# Patient Record
Sex: MALE | Race: WHITE | NOT HISPANIC OR LATINO
[De-identification: names, ages, dates, MRNs, and addresses within clinical notes are randomized per-mention and may not be internally consistent; named-entity substitution may affect disease eponyms.]

---

## 2017-01-11 ENCOUNTER — FORM ENCOUNTER (OUTPATIENT)
Age: 62
End: 2017-01-11

## 2017-01-12 ENCOUNTER — OUTPATIENT (OUTPATIENT)
Dept: OUTPATIENT SERVICES | Facility: HOSPITAL | Age: 62
LOS: 1 days | End: 2017-01-12
Payer: MEDICAID

## 2017-01-12 ENCOUNTER — APPOINTMENT (OUTPATIENT)
Dept: PULMONOLOGY | Facility: CLINIC | Age: 62
End: 2017-01-12

## 2017-01-12 DIAGNOSIS — Z90.89 ACQUIRED ABSENCE OF OTHER ORGANS: Chronic | ICD-10-CM

## 2017-01-12 DIAGNOSIS — S82.891A OTHER FRACTURE OF RIGHT LOWER LEG, INITIAL ENCOUNTER FOR CLOSED FRACTURE: Chronic | ICD-10-CM

## 2017-01-12 PROCEDURE — G0297: CPT | Mod: 26

## 2017-01-12 PROCEDURE — G0297: CPT

## 2017-08-10 ENCOUNTER — APPOINTMENT (OUTPATIENT)
Dept: OTOLARYNGOLOGY | Facility: CLINIC | Age: 62
End: 2017-08-10
Payer: MEDICAID

## 2017-08-10 VITALS
DIASTOLIC BLOOD PRESSURE: 77 MMHG | SYSTOLIC BLOOD PRESSURE: 141 MMHG | HEIGHT: 71 IN | BODY MASS INDEX: 23.1 KG/M2 | HEART RATE: 74 BPM | WEIGHT: 165 LBS

## 2017-08-10 DIAGNOSIS — H93.12 TINNITUS, LEFT EAR: ICD-10-CM

## 2017-08-10 DIAGNOSIS — H61.22 IMPACTED CERUMEN, LEFT EAR: ICD-10-CM

## 2017-08-10 DIAGNOSIS — H90.3 SENSORINEURAL HEARING LOSS, BILATERAL: ICD-10-CM

## 2017-08-10 DIAGNOSIS — H91.90 UNSPECIFIED HEARING LOSS, UNSPECIFIED EAR: ICD-10-CM

## 2017-08-10 PROCEDURE — 92557 COMPREHENSIVE HEARING TEST: CPT

## 2017-08-10 PROCEDURE — G0268 REMOVAL OF IMPACTED WAX MD: CPT | Mod: LT

## 2017-08-10 PROCEDURE — 99204 OFFICE O/P NEW MOD 45 MIN: CPT | Mod: 25

## 2017-08-10 PROCEDURE — 92550 TYMPANOMETRY & REFLEX THRESH: CPT

## 2017-08-10 PROCEDURE — 92588 EVOKED AUDITORY TST COMPLETE: CPT

## 2017-08-11 PROBLEM — H93.12 TINNITUS OF LEFT EAR: Status: ACTIVE | Noted: 2017-08-11

## 2017-09-25 PROBLEM — H91.90 HEARING LOSS: Status: ACTIVE | Noted: 2017-08-11

## 2017-09-25 PROBLEM — H61.22 EXCESSIVE CERUMEN IN EAR CANAL, LEFT: Status: ACTIVE | Noted: 2017-09-25

## 2017-09-25 PROBLEM — H90.3 BILATERAL SENSORINEURAL HEARING LOSS: Status: ACTIVE | Noted: 2017-09-25

## 2018-01-29 ENCOUNTER — FORM ENCOUNTER (OUTPATIENT)
Age: 63
End: 2018-01-29

## 2018-01-30 ENCOUNTER — APPOINTMENT (OUTPATIENT)
Dept: CT IMAGING | Facility: HOSPITAL | Age: 63
End: 2018-01-30

## 2018-01-30 ENCOUNTER — APPOINTMENT (OUTPATIENT)
Dept: PULMONOLOGY | Facility: CLINIC | Age: 63
End: 2018-01-30
Payer: MEDICAID

## 2018-01-30 ENCOUNTER — OUTPATIENT (OUTPATIENT)
Dept: OUTPATIENT SERVICES | Facility: HOSPITAL | Age: 63
LOS: 1 days | End: 2018-01-30
Payer: MEDICAID

## 2018-01-30 VITALS
HEART RATE: 76 BPM | OXYGEN SATURATION: 98 % | SYSTOLIC BLOOD PRESSURE: 102 MMHG | HEIGHT: 71 IN | DIASTOLIC BLOOD PRESSURE: 70 MMHG | WEIGHT: 176 LBS | BODY MASS INDEX: 24.64 KG/M2

## 2018-01-30 DIAGNOSIS — S82.891A OTHER FRACTURE OF RIGHT LOWER LEG, INITIAL ENCOUNTER FOR CLOSED FRACTURE: Chronic | ICD-10-CM

## 2018-01-30 DIAGNOSIS — Z90.89 ACQUIRED ABSENCE OF OTHER ORGANS: Chronic | ICD-10-CM

## 2018-01-30 PROCEDURE — G0296 VISIT TO DETERM LDCT ELIG: CPT

## 2018-01-30 PROCEDURE — G0297: CPT | Mod: 26

## 2018-01-30 PROCEDURE — G0297: CPT

## 2018-04-11 ENCOUNTER — APPOINTMENT (OUTPATIENT)
Dept: ORTHOPEDIC SURGERY | Facility: CLINIC | Age: 63
End: 2018-04-11
Payer: MEDICAID

## 2018-04-11 VITALS
HEIGHT: 71 IN | WEIGHT: 170 LBS | SYSTOLIC BLOOD PRESSURE: 143 MMHG | BODY MASS INDEX: 23.8 KG/M2 | DIASTOLIC BLOOD PRESSURE: 87 MMHG

## 2018-04-11 DIAGNOSIS — S82.61XS DISPLACED FRACTURE OF LATERAL MALLEOLUS OF RIGHT FIBULA, SEQUELA: ICD-10-CM

## 2018-04-11 DIAGNOSIS — M76.71 PERONEAL TENDINITIS, RIGHT LEG: ICD-10-CM

## 2018-04-11 PROCEDURE — 73610 X-RAY EXAM OF ANKLE: CPT | Mod: RT

## 2018-04-11 PROCEDURE — 99203 OFFICE O/P NEW LOW 30 MIN: CPT

## 2019-02-04 ENCOUNTER — FORM ENCOUNTER (OUTPATIENT)
Age: 64
End: 2019-02-04

## 2019-02-05 ENCOUNTER — OUTPATIENT (OUTPATIENT)
Dept: OUTPATIENT SERVICES | Facility: HOSPITAL | Age: 64
LOS: 1 days | End: 2019-02-05
Payer: MEDICAID

## 2019-02-05 ENCOUNTER — APPOINTMENT (OUTPATIENT)
Dept: PULMONOLOGY | Facility: CLINIC | Age: 64
End: 2019-02-05
Payer: MEDICAID

## 2019-02-05 ENCOUNTER — APPOINTMENT (OUTPATIENT)
Dept: CT IMAGING | Facility: HOSPITAL | Age: 64
End: 2019-02-05

## 2019-02-05 VITALS
HEIGHT: 71 IN | OXYGEN SATURATION: 96 % | RESPIRATION RATE: 12 BRPM | HEART RATE: 81 BPM | SYSTOLIC BLOOD PRESSURE: 158 MMHG | TEMPERATURE: 97.2 F | BODY MASS INDEX: 25.2 KG/M2 | DIASTOLIC BLOOD PRESSURE: 98 MMHG | WEIGHT: 180 LBS

## 2019-02-05 DIAGNOSIS — S82.891A OTHER FRACTURE OF RIGHT LOWER LEG, INITIAL ENCOUNTER FOR CLOSED FRACTURE: Chronic | ICD-10-CM

## 2019-02-05 DIAGNOSIS — Z90.89 ACQUIRED ABSENCE OF OTHER ORGANS: Chronic | ICD-10-CM

## 2019-02-05 PROCEDURE — G0297: CPT | Mod: 26

## 2019-02-05 PROCEDURE — G0296 VISIT TO DETERM LDCT ELIG: CPT

## 2019-02-05 PROCEDURE — G0297: CPT

## 2019-02-05 PROCEDURE — 99406 BEHAV CHNG SMOKING 3-10 MIN: CPT

## 2019-12-20 ENCOUNTER — INPATIENT (INPATIENT)
Facility: HOSPITAL | Age: 64
LOS: 3 days | Discharge: HOME | DRG: 517 | End: 2019-12-24
Attending: ORTHOPAEDIC SURGERY | Admitting: ORTHOPAEDIC SURGERY
Payer: MEDICAID

## 2019-12-20 VITALS
HEART RATE: 88 BPM | DIASTOLIC BLOOD PRESSURE: 104 MMHG | RESPIRATION RATE: 18 BRPM | TEMPERATURE: 98 F | OXYGEN SATURATION: 98 % | HEIGHT: 71 IN | SYSTOLIC BLOOD PRESSURE: 166 MMHG | WEIGHT: 175.05 LBS

## 2019-12-20 DIAGNOSIS — Z90.89 ACQUIRED ABSENCE OF OTHER ORGANS: Chronic | ICD-10-CM

## 2019-12-20 DIAGNOSIS — S82.891A OTHER FRACTURE OF RIGHT LOWER LEG, INITIAL ENCOUNTER FOR CLOSED FRACTURE: Chronic | ICD-10-CM

## 2019-12-20 LAB
ALBUMIN SERPL ELPH-MCNC: 4.5 G/DL — SIGNIFICANT CHANGE UP (ref 3.3–5)
ALP SERPL-CCNC: 61 U/L — SIGNIFICANT CHANGE UP (ref 40–120)
ALT FLD-CCNC: 17 U/L — SIGNIFICANT CHANGE UP (ref 10–45)
ANION GAP SERPL CALC-SCNC: 14 MMOL/L — SIGNIFICANT CHANGE UP (ref 5–17)
APTT BLD: 27.8 SEC — SIGNIFICANT CHANGE UP (ref 27.5–36.3)
AST SERPL-CCNC: 23 U/L — SIGNIFICANT CHANGE UP (ref 10–40)
BASOPHILS # BLD AUTO: 0.05 K/UL — SIGNIFICANT CHANGE UP (ref 0–0.2)
BASOPHILS NFR BLD AUTO: 0.8 % — SIGNIFICANT CHANGE UP (ref 0–2)
BILIRUB SERPL-MCNC: 0.4 MG/DL — SIGNIFICANT CHANGE UP (ref 0.2–1.2)
BLD GP AB SCN SERPL QL: NEGATIVE — SIGNIFICANT CHANGE UP
BUN SERPL-MCNC: 13 MG/DL — SIGNIFICANT CHANGE UP (ref 7–23)
CALCIUM SERPL-MCNC: 9.5 MG/DL — SIGNIFICANT CHANGE UP (ref 8.4–10.5)
CHLORIDE SERPL-SCNC: 108 MMOL/L — SIGNIFICANT CHANGE UP (ref 96–108)
CO2 SERPL-SCNC: 19 MMOL/L — LOW (ref 22–31)
CREAT SERPL-MCNC: 0.77 MG/DL — SIGNIFICANT CHANGE UP (ref 0.5–1.3)
EOSINOPHIL # BLD AUTO: 0.12 K/UL — SIGNIFICANT CHANGE UP (ref 0–0.5)
EOSINOPHIL NFR BLD AUTO: 1.8 % — SIGNIFICANT CHANGE UP (ref 0–6)
GLUCOSE SERPL-MCNC: 108 MG/DL — HIGH (ref 70–99)
HCT VFR BLD CALC: 45.8 % — SIGNIFICANT CHANGE UP (ref 39–50)
HGB BLD-MCNC: 15.3 G/DL — SIGNIFICANT CHANGE UP (ref 13–17)
IMM GRANULOCYTES NFR BLD AUTO: 0.2 % — SIGNIFICANT CHANGE UP (ref 0–1.5)
INR BLD: 0.99 — SIGNIFICANT CHANGE UP (ref 0.88–1.16)
LYMPHOCYTES # BLD AUTO: 1.36 K/UL — SIGNIFICANT CHANGE UP (ref 1–3.3)
LYMPHOCYTES # BLD AUTO: 20.5 % — SIGNIFICANT CHANGE UP (ref 13–44)
MCHC RBC-ENTMCNC: 31.9 PG — SIGNIFICANT CHANGE UP (ref 27–34)
MCHC RBC-ENTMCNC: 33.4 GM/DL — SIGNIFICANT CHANGE UP (ref 32–36)
MCV RBC AUTO: 95.4 FL — SIGNIFICANT CHANGE UP (ref 80–100)
MONOCYTES # BLD AUTO: 0.6 K/UL — SIGNIFICANT CHANGE UP (ref 0–0.9)
MONOCYTES NFR BLD AUTO: 9 % — SIGNIFICANT CHANGE UP (ref 2–14)
NEUTROPHILS # BLD AUTO: 4.49 K/UL — SIGNIFICANT CHANGE UP (ref 1.8–7.4)
NEUTROPHILS NFR BLD AUTO: 67.7 % — SIGNIFICANT CHANGE UP (ref 43–77)
NRBC # BLD: 0 /100 WBCS — SIGNIFICANT CHANGE UP (ref 0–0)
PLATELET # BLD AUTO: 192 K/UL — SIGNIFICANT CHANGE UP (ref 150–400)
POTASSIUM SERPL-MCNC: 4 MMOL/L — SIGNIFICANT CHANGE UP (ref 3.5–5.3)
POTASSIUM SERPL-SCNC: 4 MMOL/L — SIGNIFICANT CHANGE UP (ref 3.5–5.3)
PROT SERPL-MCNC: 7 G/DL — SIGNIFICANT CHANGE UP (ref 6–8.3)
PROTHROM AB SERPL-ACNC: 11.2 SEC — SIGNIFICANT CHANGE UP (ref 10–12.9)
RBC # BLD: 4.8 M/UL — SIGNIFICANT CHANGE UP (ref 4.2–5.8)
RBC # FLD: 12.1 % — SIGNIFICANT CHANGE UP (ref 10.3–14.5)
RH IG SCN BLD-IMP: NEGATIVE — SIGNIFICANT CHANGE UP
SODIUM SERPL-SCNC: 141 MMOL/L — SIGNIFICANT CHANGE UP (ref 135–145)
WBC # BLD: 6.63 K/UL — SIGNIFICANT CHANGE UP (ref 3.8–10.5)
WBC # FLD AUTO: 6.63 K/UL — SIGNIFICANT CHANGE UP (ref 3.8–10.5)

## 2019-12-20 PROCEDURE — 73700 CT LOWER EXTREMITY W/O DYE: CPT | Mod: 26,RT

## 2019-12-20 PROCEDURE — 99253 IP/OBS CNSLTJ NEW/EST LOW 45: CPT | Mod: GC

## 2019-12-20 PROCEDURE — 71045 X-RAY EXAM CHEST 1 VIEW: CPT | Mod: 26

## 2019-12-20 PROCEDURE — 73590 X-RAY EXAM OF LOWER LEG: CPT | Mod: 26

## 2019-12-20 PROCEDURE — 73552 X-RAY EXAM OF FEMUR 2/>: CPT | Mod: 26

## 2019-12-20 PROCEDURE — 99285 EMERGENCY DEPT VISIT HI MDM: CPT | Mod: 25

## 2019-12-20 PROCEDURE — 73562 X-RAY EXAM OF KNEE 3: CPT | Mod: 26

## 2019-12-20 PROCEDURE — 73560 X-RAY EXAM OF KNEE 1 OR 2: CPT | Mod: 26,RT

## 2019-12-20 RX ORDER — MORPHINE SULFATE 50 MG/1
4 CAPSULE, EXTENDED RELEASE ORAL ONCE
Refills: 0 | Status: DISCONTINUED | OUTPATIENT
Start: 2019-12-20 | End: 2019-12-20

## 2019-12-20 RX ORDER — ONDANSETRON 8 MG/1
4 TABLET, FILM COATED ORAL EVERY 6 HOURS
Refills: 0 | Status: DISCONTINUED | OUTPATIENT
Start: 2019-12-20 | End: 2019-12-24

## 2019-12-20 RX ORDER — SENNA PLUS 8.6 MG/1
2 TABLET ORAL AT BEDTIME
Refills: 0 | Status: DISCONTINUED | OUTPATIENT
Start: 2019-12-20 | End: 2019-12-24

## 2019-12-20 RX ORDER — PANTOPRAZOLE SODIUM 20 MG/1
40 TABLET, DELAYED RELEASE ORAL
Refills: 0 | Status: DISCONTINUED | OUTPATIENT
Start: 2019-12-20 | End: 2019-12-24

## 2019-12-20 RX ORDER — POLYETHYLENE GLYCOL 3350 17 G/17G
17 POWDER, FOR SOLUTION ORAL DAILY
Refills: 0 | Status: DISCONTINUED | OUTPATIENT
Start: 2019-12-20 | End: 2019-12-24

## 2019-12-20 RX ORDER — LEVOTHYROXINE SODIUM 125 MCG
88 TABLET ORAL DAILY
Refills: 0 | Status: DISCONTINUED | OUTPATIENT
Start: 2019-12-21 | End: 2019-12-24

## 2019-12-20 RX ORDER — ACETAMINOPHEN 500 MG
650 TABLET ORAL EVERY 6 HOURS
Refills: 0 | Status: DISCONTINUED | OUTPATIENT
Start: 2019-12-20 | End: 2019-12-22

## 2019-12-20 RX ORDER — AMLODIPINE BESYLATE 2.5 MG/1
10 TABLET ORAL DAILY
Refills: 0 | Status: DISCONTINUED | OUTPATIENT
Start: 2019-12-21 | End: 2019-12-24

## 2019-12-20 RX ORDER — SODIUM CHLORIDE 9 MG/ML
1000 INJECTION INTRAMUSCULAR; INTRAVENOUS; SUBCUTANEOUS
Refills: 0 | Status: DISCONTINUED | OUTPATIENT
Start: 2019-12-20 | End: 2019-12-24

## 2019-12-20 RX ORDER — SODIUM CHLORIDE 9 MG/ML
3 INJECTION INTRAMUSCULAR; INTRAVENOUS; SUBCUTANEOUS ONCE
Refills: 0 | Status: COMPLETED | OUTPATIENT
Start: 2019-12-20 | End: 2019-12-20

## 2019-12-20 RX ORDER — ZOLPIDEM TARTRATE 10 MG/1
5 TABLET ORAL AT BEDTIME
Refills: 0 | Status: DISCONTINUED | OUTPATIENT
Start: 2019-12-20 | End: 2019-12-24

## 2019-12-20 RX ADMIN — MORPHINE SULFATE 4 MILLIGRAM(S): 50 CAPSULE, EXTENDED RELEASE ORAL at 19:11

## 2019-12-20 RX ADMIN — MORPHINE SULFATE 4 MILLIGRAM(S): 50 CAPSULE, EXTENDED RELEASE ORAL at 23:39

## 2019-12-20 RX ADMIN — SODIUM CHLORIDE 3 MILLILITER(S): 9 INJECTION INTRAMUSCULAR; INTRAVENOUS; SUBCUTANEOUS at 19:13

## 2019-12-20 NOTE — ED PROVIDER NOTE - DIAGNOSTIC INTERPRETATION
CXR- 1 view- no consolidation- no effusion normal bones and ST nl mediastinum     right knee- 3 views- displaced  patellar fx noted     right tibia/ fib - 2 views- right patellar fx      right femur- 2 views- right patellar fx

## 2019-12-20 NOTE — ED ADULT NURSE NOTE - INTERVENTIONS DEFINITIONS
Instruct patient to call for assistance/Non-slip footwear when patient is off stretcher/Physically safe environment: no spills, clutter or unnecessary equipment/Stretcher in lowest position, wheels locked, appropriate side rails in place/Provide visual cue, wrist band, yellow gown, etc.

## 2019-12-20 NOTE — ED ADULT NURSE NOTE - PSH
Ankle fracture, right  5 yrs ago tx @[facility] with plates and rodes  S/P tonsillectomy

## 2019-12-20 NOTE — ED PROVIDER NOTE - OBJECTIVE STATEMENT
65 yo male with hx of HTN bibems after mechanical fall 1 hr ago in his building lobby - pt reports the floor was slippery and he subsequently fell onto his right knee- superficial abrasion mid aspect of knee with STS and noted effusion on arrival to ED- no LOC, no head traum  no sob or chest pain before or after the fall- no dizziness, TET UTD

## 2019-12-20 NOTE — ED ADULT NURSE NOTE - OBJECTIVE STATEMENT
Patient is a 63yo male reporting mechanical fall on marble today, landing on right knee. Patient reports pain, swelling, and inability to bear weight on right knee. Denies head injury, LOC, chest pain, shortness of breath. Pedal pulses intact.

## 2019-12-20 NOTE — H&P ADULT - ASSESSMENT
A/P: 64M presents with displaced R patella fracture  - Admit to Orthopaedics  - Plan for OR tomorrow w Dr. Aldana for ORIF R patella  - Patient placed in knee immobilizer -- NWB RLE  - NPO/IVF at midnight  - Pain control  - Hold anticoagulation for OR  - CBC/CMP/Coags/UA/T&S  - CXR/EKG  - Medical Clearance by med consult pending  - Discussed with attending-- Dr. Aldana    Ortho Pager [phone]

## 2019-12-20 NOTE — H&P ADULT - NSHPLABSRESULTS_GEN_ALL_CORE
Labs:                        15.3   6.63  )-----------( 192      ( 20 Dec 2019 19:10 )             45.8       12-20    141  |  108  |  13  ----------------------------<  108<H>  4.0   |  19<L>  |  0.77    Ca    9.5      20 Dec 2019 19:10    TPro  7.0  /  Alb  4.5  /  TBili  0.4  /  DBili  x   /  AST  23  /  ALT  17  /  AlkPhos  61  12-20          PT/INR - ( 20 Dec 2019 19:10 )   PT: 11.2 sec;   INR: 0.99          PTT - ( 20 Dec 2019 19:10 )  PTT:27.8 sec      R knee XR shows significantly displaced transverse fracture of the patella    CT pending

## 2019-12-20 NOTE — H&P ADULT - NSHPPHYSICALEXAM_GEN_ALL_CORE
Vital Signs Last 24 Hrs  T(C): 36.6 (20 Dec 2019 22:13), Max: 36.6 (20 Dec 2019 18:22)  T(F): 97.9 (20 Dec 2019 22:13), Max: 97.9 (20 Dec 2019 18:22)  HR: 73 (20 Dec 2019 22:13) (73 - 88)  BP: 136/78 (20 Dec 2019 22:13) (136/78 - 166/104)  BP(mean): --  RR: 18 (20 Dec 2019 22:13) (18 - 18)  SpO2: 95% (20 Dec 2019 22:13) (95% - 98%)    VSS  General: Well-appearing adult male lying supine; NAD; A&Ox3  RLE: Obvious visible and palpable gapping of the patella; Small superficial abrasion over the inferior fragment that does not track deeply; Unable to straight leg raise; NVID, DP 2+, TA/EHL/FHL/GS 5/5; Knee ROM limited due to pain

## 2019-12-20 NOTE — ED ADULT TRIAGE NOTE - OTHER COMPLAINTS
slip and fall on marble, landed on R knee states "it feels like it is split in half", denies other injuries. +swelling noted to R knee

## 2019-12-20 NOTE — H&P ADULT - NSICDXPASTSURGICALHX_GEN_ALL_CORE_FT
PAST SURGICAL HISTORY:  Ankle fracture, right 5 yrs ago tx @[facility] with plates and sharron    S/P tonsillectomy

## 2019-12-20 NOTE — ED PROVIDER NOTE - PSH
Ankle fracture, right  5 yrs ago tx @[facility] with plates and rodes  S/P tonsillectomy

## 2019-12-20 NOTE — H&P ADULT - HISTORY OF PRESENT ILLNESS
64M presents w a R patella fracture after a fall in his apartment building. Pt states he was walking for the elevator on the wet lobby floor. Slipped and fell directly onto bent R knee. Had immediate pain and felt a "crack." Unable to ambulate afterwards. Denies any other injuries or sxs at this time. Denies any sxs that preceded his fall including chest pain/SOB/dizziness/blurred vision. Denies head trauma/LOC.

## 2019-12-20 NOTE — ED PROVIDER NOTE - CLINICAL SUMMARY MEDICAL DECISION MAKING FREE TEXT BOX
fall patellar  fx  admit ortho surgery in am 65 yo male with mechanical fall, noted displaced right patellar fx requires admission for ORIF

## 2019-12-20 NOTE — ED PROVIDER NOTE - SKIN [+], MLM
.25 cm  right anterior knee  pos STS  with hematoma and palp defect secondary to noted patellar fx/ABRASION

## 2019-12-20 NOTE — H&P ADULT - NSICDXFAMILYHX_GEN_ALL_CORE_FT
FAMILY HISTORY:  Father  Still living? No  Family history of acute myocardial infarction, Age at diagnosis: Age Unknown

## 2019-12-20 NOTE — ED PROVIDER NOTE - WEIGHT BEARING
pt states earlier today while driving he experienced some palpations tingling down b/l arms and ears. states all symptoms have resolved and now he feels weak. vss, NAD pt ambulatory to triage with steady gait. UNABLE

## 2019-12-21 LAB
ANION GAP SERPL CALC-SCNC: 10 MMOL/L — SIGNIFICANT CHANGE UP (ref 5–17)
APPEARANCE UR: CLEAR — SIGNIFICANT CHANGE UP
BILIRUB UR-MCNC: NEGATIVE — SIGNIFICANT CHANGE UP
BUN SERPL-MCNC: 17 MG/DL — SIGNIFICANT CHANGE UP (ref 7–23)
CALCIUM SERPL-MCNC: 8.9 MG/DL — SIGNIFICANT CHANGE UP (ref 8.4–10.5)
CHLORIDE SERPL-SCNC: 105 MMOL/L — SIGNIFICANT CHANGE UP (ref 96–108)
CO2 SERPL-SCNC: 23 MMOL/L — SIGNIFICANT CHANGE UP (ref 22–31)
COLOR SPEC: YELLOW — SIGNIFICANT CHANGE UP
CREAT SERPL-MCNC: 0.79 MG/DL — SIGNIFICANT CHANGE UP (ref 0.5–1.3)
DIFF PNL FLD: NEGATIVE — SIGNIFICANT CHANGE UP
GLUCOSE SERPL-MCNC: 96 MG/DL — SIGNIFICANT CHANGE UP (ref 70–99)
GLUCOSE UR QL: NEGATIVE — SIGNIFICANT CHANGE UP
HCT VFR BLD CALC: 39.7 % — SIGNIFICANT CHANGE UP (ref 39–50)
HCV AB S/CO SERPL IA: 0.16 S/CO — SIGNIFICANT CHANGE UP
HCV AB SERPL-IMP: SIGNIFICANT CHANGE UP
HGB BLD-MCNC: 13 G/DL — SIGNIFICANT CHANGE UP (ref 13–17)
KETONES UR-MCNC: NEGATIVE — SIGNIFICANT CHANGE UP
LEUKOCYTE ESTERASE UR-ACNC: NEGATIVE — SIGNIFICANT CHANGE UP
MCHC RBC-ENTMCNC: 31.9 PG — SIGNIFICANT CHANGE UP (ref 27–34)
MCHC RBC-ENTMCNC: 32.7 GM/DL — SIGNIFICANT CHANGE UP (ref 32–36)
MCV RBC AUTO: 97.3 FL — SIGNIFICANT CHANGE UP (ref 80–100)
NITRITE UR-MCNC: NEGATIVE — SIGNIFICANT CHANGE UP
NRBC # BLD: 0 /100 WBCS — SIGNIFICANT CHANGE UP (ref 0–0)
PH UR: 6.5 — SIGNIFICANT CHANGE UP (ref 5–8)
PLATELET # BLD AUTO: 161 K/UL — SIGNIFICANT CHANGE UP (ref 150–400)
POTASSIUM SERPL-MCNC: 3.8 MMOL/L — SIGNIFICANT CHANGE UP (ref 3.5–5.3)
POTASSIUM SERPL-SCNC: 3.8 MMOL/L — SIGNIFICANT CHANGE UP (ref 3.5–5.3)
PROT UR-MCNC: NEGATIVE MG/DL — SIGNIFICANT CHANGE UP
RBC # BLD: 4.08 M/UL — LOW (ref 4.2–5.8)
RBC # FLD: 12.2 % — SIGNIFICANT CHANGE UP (ref 10.3–14.5)
SODIUM SERPL-SCNC: 138 MMOL/L — SIGNIFICANT CHANGE UP (ref 135–145)
SP GR SPEC: 1.02 — SIGNIFICANT CHANGE UP (ref 1–1.03)
UROBILINOGEN FLD QL: 0.2 E.U./DL — SIGNIFICANT CHANGE UP
WBC # BLD: 7.06 K/UL — SIGNIFICANT CHANGE UP (ref 3.8–10.5)
WBC # FLD AUTO: 7.06 K/UL — SIGNIFICANT CHANGE UP (ref 3.8–10.5)

## 2019-12-21 PROCEDURE — 73560 X-RAY EXAM OF KNEE 1 OR 2: CPT | Mod: 26,RT

## 2019-12-21 PROCEDURE — 73630 X-RAY EXAM OF FOOT: CPT | Mod: 26,LT

## 2019-12-21 RX ORDER — DIAZEPAM 5 MG
5 TABLET ORAL EVERY 8 HOURS
Refills: 0 | Status: DISCONTINUED | OUTPATIENT
Start: 2019-12-21 | End: 2019-12-24

## 2019-12-21 RX ORDER — ASPIRIN/CALCIUM CARB/MAGNESIUM 324 MG
325 TABLET ORAL
Refills: 0 | Status: DISCONTINUED | OUTPATIENT
Start: 2019-12-21 | End: 2019-12-24

## 2019-12-21 RX ORDER — MORPHINE SULFATE 50 MG/1
4 CAPSULE, EXTENDED RELEASE ORAL EVERY 4 HOURS
Refills: 0 | Status: DISCONTINUED | OUTPATIENT
Start: 2019-12-21 | End: 2019-12-24

## 2019-12-21 RX ORDER — CEFAZOLIN SODIUM 1 G
2000 VIAL (EA) INJECTION EVERY 8 HOURS
Refills: 0 | Status: COMPLETED | OUTPATIENT
Start: 2019-12-21 | End: 2019-12-22

## 2019-12-21 RX ORDER — OXYCODONE HYDROCHLORIDE 5 MG/1
5 TABLET ORAL EVERY 4 HOURS
Refills: 0 | Status: DISCONTINUED | OUTPATIENT
Start: 2019-12-21 | End: 2019-12-24

## 2019-12-21 RX ORDER — MORPHINE SULFATE 50 MG/1
4 CAPSULE, EXTENDED RELEASE ORAL EVERY 4 HOURS
Refills: 0 | Status: DISCONTINUED | OUTPATIENT
Start: 2019-12-21 | End: 2019-12-21

## 2019-12-21 RX ORDER — CEFAZOLIN SODIUM 1 G
2000 VIAL (EA) INJECTION EVERY 8 HOURS
Refills: 0 | Status: DISCONTINUED | OUTPATIENT
Start: 2019-12-21 | End: 2019-12-21

## 2019-12-21 RX ORDER — HYDROMORPHONE HYDROCHLORIDE 2 MG/ML
0.5 INJECTION INTRAMUSCULAR; INTRAVENOUS; SUBCUTANEOUS ONCE
Refills: 0 | Status: DISCONTINUED | OUTPATIENT
Start: 2019-12-21 | End: 2019-12-21

## 2019-12-21 RX ORDER — MORPHINE SULFATE 50 MG/1
2 CAPSULE, EXTENDED RELEASE ORAL
Refills: 0 | Status: DISCONTINUED | OUTPATIENT
Start: 2019-12-21 | End: 2019-12-24

## 2019-12-21 RX ORDER — BUPIVACAINE 13.3 MG/ML
20 INJECTION, SUSPENSION, LIPOSOMAL INFILTRATION ONCE
Refills: 0 | Status: DISCONTINUED | OUTPATIENT
Start: 2019-12-21 | End: 2019-12-24

## 2019-12-21 RX ORDER — OXYCODONE HYDROCHLORIDE 5 MG/1
10 TABLET ORAL EVERY 4 HOURS
Refills: 0 | Status: DISCONTINUED | OUTPATIENT
Start: 2019-12-21 | End: 2019-12-24

## 2019-12-21 RX ADMIN — OXYCODONE HYDROCHLORIDE 10 MILLIGRAM(S): 5 TABLET ORAL at 17:01

## 2019-12-21 RX ADMIN — AMLODIPINE BESYLATE 10 MILLIGRAM(S): 2.5 TABLET ORAL at 05:45

## 2019-12-21 RX ADMIN — Medication 88 MICROGRAM(S): at 05:46

## 2019-12-21 RX ADMIN — OXYCODONE HYDROCHLORIDE 10 MILLIGRAM(S): 5 TABLET ORAL at 21:40

## 2019-12-21 RX ADMIN — Medication 650 MILLIGRAM(S): at 21:40

## 2019-12-21 RX ADMIN — Medication 325 MILLIGRAM(S): at 17:11

## 2019-12-21 RX ADMIN — OXYCODONE HYDROCHLORIDE 10 MILLIGRAM(S): 5 TABLET ORAL at 08:30

## 2019-12-21 RX ADMIN — HYDROMORPHONE HYDROCHLORIDE 0.5 MILLIGRAM(S): 2 INJECTION INTRAMUSCULAR; INTRAVENOUS; SUBCUTANEOUS at 23:05

## 2019-12-21 RX ADMIN — MORPHINE SULFATE 2 MILLIGRAM(S): 50 CAPSULE, EXTENDED RELEASE ORAL at 15:31

## 2019-12-21 RX ADMIN — OXYCODONE HYDROCHLORIDE 10 MILLIGRAM(S): 5 TABLET ORAL at 20:40

## 2019-12-21 RX ADMIN — MORPHINE SULFATE 2 MILLIGRAM(S): 50 CAPSULE, EXTENDED RELEASE ORAL at 15:47

## 2019-12-21 RX ADMIN — Medication 650 MILLIGRAM(S): at 20:40

## 2019-12-21 RX ADMIN — SENNA PLUS 2 TABLET(S): 8.6 TABLET ORAL at 01:07

## 2019-12-21 RX ADMIN — Medication 2000 MILLIGRAM(S): at 19:17

## 2019-12-21 RX ADMIN — MORPHINE SULFATE 4 MILLIGRAM(S): 50 CAPSULE, EXTENDED RELEASE ORAL at 19:15

## 2019-12-21 RX ADMIN — HYDROMORPHONE HYDROCHLORIDE 0.5 MILLIGRAM(S): 2 INJECTION INTRAMUSCULAR; INTRAVENOUS; SUBCUTANEOUS at 22:47

## 2019-12-21 RX ADMIN — OXYCODONE HYDROCHLORIDE 10 MILLIGRAM(S): 5 TABLET ORAL at 18:01

## 2019-12-21 RX ADMIN — OXYCODONE HYDROCHLORIDE 10 MILLIGRAM(S): 5 TABLET ORAL at 09:30

## 2019-12-21 RX ADMIN — ZOLPIDEM TARTRATE 5 MILLIGRAM(S): 10 TABLET ORAL at 02:03

## 2019-12-21 RX ADMIN — MORPHINE SULFATE 4 MILLIGRAM(S): 50 CAPSULE, EXTENDED RELEASE ORAL at 19:35

## 2019-12-21 NOTE — CONSULT NOTE ADULT - ASSESSMENT
64M PMH HTN, hypothyroidism kidney stones presents for right patella fracture after mechanical fall planning for ortho repair. Medicine consulted for pre-op clearance. Pt with METS >4 and good functional status. EKG WNL. RCRI 0 (Class I risk). Pt is low cardiac risk for low risk surgery. No further medical optimization at this time.     Perioperative Management   -Can c/w home amlodipine and levothyroxine       Note incomplete, not yet seen by attending 64M PMH HTN, hypothyroidism kidney stones presents for right patella fracture after mechanical fall planning for ortho repair. Medicine consulted for pre-op clearance. Pt with METS >4 and good functional status. EKG WNL. RCRI 0 (Class I risk). Pt is low cardiac risk for low risk surgery. No further medical optimization at this time.     Perioperative Management   -Can c/w home amlodipine 10mg   -Should med rec in AM for Levothyroxine dose and continue   -Plan discussed with Attending and Primary team 64M PMH HTN, hypothyroidism kidney stones presents for right patella fracture after mechanical fall planning for ortho repair. Medicine consulted for pre-op clearance. Pt with METS >4 and good functional status. CXR clear. EKG WNL. ECHO from 2016 with mild LVH otherwise ok. RCRI 0 (Class I risk). Pt is low cardiac risk for low risk surgery. No further medical optimization at this time.     Perioperative Management   -Can c/w home amlodipine 10mg   -Should med rec in AM for Levothyroxine dose and continue   -Plan discussed with Attending and Primary team

## 2019-12-21 NOTE — CONSULT NOTE ADULT - SUBJECTIVE AND OBJECTIVE BOX
64M PMH HTN, hypothyroidism kidney stones presents for right patella fracture after mechanical fall planning for ortho repair. Medicine consulted for pre-op clearance. Pt believes himself to be in good health, no recent illnesses or hospitalizations. Denies adverse reaction to anesthesia in self or family. States HTN well controlled on Amlodipine 10mg qd (usually 120/80s), denies CAD, MI, CVA hx. Has had EKGs and echos reportedly WNL. C/o right knee pain at this time.  Patient denies: fever, chills, dizziness, weakness, HA, Changes in vision, CP, palpitations, SOB, cough, N/V/D/C, dysuria, changes in bowel movements, LE edema. ROS otherwise negative.    Surgical Hx:   -Plate in Right ankle 2009  -Tonsils out in 1963    METS  -Exercises >3x/wk  -Can climb subway stairs  -Performs all ADLs independently     Toxic Habits   -Denies cigarrettes, EtOH use, recreational drug use     Meds  -Amlodipine 10   -Levothyroxine (unknown dose, but has not been adjusted recently  -Multivitamin     EKG: NSR, intervals WNL, no TWI or ST changes.     VITAL SIGNS:  T(F): 97.9 (12-20-19 @ 22:13)  HR: 73 (12-20-19 @ 22:13)  BP: 136/78 (12-20-19 @ 22:13)  RR: 18 (12-20-19 @ 22:13)  SpO2: 95% (12-20-19 @ 22:13)  Wt(kg): --    PHYSICAL EXAM:    Constitutional: WDWN, NAD  HEENT: PERRL, EOMI, sclera non-icteric, neck supple, trachea midline, no masses, no JVD, MMM, good dentition  Respiratory: CTA b/l, good air entry b/l, no wheezing, no rhonchi, no rales, without accessory muscle use and no intercostal retractions  Cardiovascular: RRR, normal S1S2, no M/R/G  Gastrointestinal: soft, NTND, no masses palpable, BS normal  Extremities: Warm, well perfused, pulses equal bilateral upper and lower extremities, no edema, no clubbing, pulses 2+ and symmetric, sensation intact   RLE in braceL grossly swollen, dime sized abrasion on kneecap   Neurological: AAOx3, CN Grossly intact  Skin: Normal temperature, warm, dry    MEDICATIONS  (STANDING):  amLODIPine   Tablet 10 milliGRAM(s) Oral daily  levothyroxine 88 MICROGram(s) Oral daily  pantoprazole    Tablet 40 milliGRAM(s) Oral before breakfast  polyethylene glycol 3350 17 Gram(s) Oral daily  sodium chloride 0.9%. 1000 milliLiter(s) (100 mL/Hr) IV Continuous <Continuous>    MEDICATIONS  (PRN):  acetaminophen   Tablet .. 650 milliGRAM(s) Oral every 6 hours PRN Temp greater or equal to 38C (100.4F), Mild Pain (1 - 3)  aluminum hydroxide/magnesium hydroxide/simethicone Suspension 30 milliLiter(s) Oral four times a day PRN Indigestion  ondansetron Injectable 4 milliGRAM(s) IV Push every 6 hours PRN Nausea and/or Vomiting  senna 2 Tablet(s) Oral at bedtime PRN Constipation  zolpidem 5 milliGRAM(s) Oral at bedtime PRN Insomnia      Allergies    Dilaudid (Other; Pruritus; Rash)    Intolerances        LABS:                        15.3   6.63  )-----------( 192      ( 20 Dec 2019 19:10 )             45.8     12-20    141  |  108  |  13  ----------------------------<  108<H>  4.0   |  19<L>  |  0.77    Ca    9.5      20 Dec 2019 19:10    TPro  7.0  /  Alb  4.5  /  TBili  0.4  /  DBili  x   /  AST  23  /  ALT  17  /  AlkPhos  61  12-20    PT/INR - ( 20 Dec 2019 19:10 )   PT: 11.2 sec;   INR: 0.99          PTT - ( 20 Dec 2019 19:10 )  PTT:27.8 sec      RADIOLOGY & ADDITIONAL TESTS:  Reviewed 64M PMH HTN, hypothyroidism kidney stones (medically managed) presents for right patella fracture after mechanical fall planning for ortho repair. Medicine consulted for pre-op clearance. Pt believes himself to be in good health, no recent illnesses or hospitalizations. Denies adverse reaction to anesthesia in self or family. States HTN well controlled on Amlodipine 10mg qd (usually 120/80s), denies CAD, MI, CVA hx. Has had EKGs and echos reportedly WNL. C/o right knee pain at this time.  Patient denies: fever, chills, dizziness, weakness, HA, Changes in vision, CP, palpitations, SOB, cough, N/V/D/C, dysuria, changes in bowel movements, LE edema. ROS otherwise negative.    Surgical Hx:   -Plate in Right ankle 2009  -Tonsils out in 1963    METS  -Exercises >3x/wk  -Can climb subway stairs  -Performs all ADLs independently     Toxic Habits   -Denies cigarrettes, EtOH use, recreational drug use     Meds  -Amlodipine 10   -Levothyroxine (unknown dose, but has not been adjusted recently  -Multivitamin     EKG: NSR, intervals WNL, no TWI or ST changes.   Echo 2016: mild LVH, EF 55%     VITAL SIGNS:  T(F): 97.9 (12-20-19 @ 22:13)  HR: 73 (12-20-19 @ 22:13)  BP: 136/78 (12-20-19 @ 22:13)  RR: 18 (12-20-19 @ 22:13)  SpO2: 95% (12-20-19 @ 22:13)  Wt(kg): --    PHYSICAL EXAM:    Constitutional: WDWN, NAD  HEENT: PERRL, EOMI, sclera non-icteric, neck supple, trachea midline, no masses, no JVD, MMM, good dentition  Respiratory: CTA b/l, good air entry b/l, no wheezing, no rhonchi, no rales, without accessory muscle use and no intercostal retractions  Cardiovascular: RRR, normal S1S2, no M/R/G  Gastrointestinal: soft, NTND, no masses palpable, BS normal  Extremities: Warm, well perfused, pulses equal bilateral upper and lower extremities, no edema, no clubbing, pulses 2+ and symmetric, sensation intact   RLE in braceL grossly swollen, dime sized abrasion on kneecap   Neurological: AAOx3, CN Grossly intact  Skin: Normal temperature, warm, dry    MEDICATIONS  (STANDING):  amLODIPine   Tablet 10 milliGRAM(s) Oral daily  levothyroxine 88 MICROGram(s) Oral daily  pantoprazole    Tablet 40 milliGRAM(s) Oral before breakfast  polyethylene glycol 3350 17 Gram(s) Oral daily  sodium chloride 0.9%. 1000 milliLiter(s) (100 mL/Hr) IV Continuous <Continuous>    MEDICATIONS  (PRN):  acetaminophen   Tablet .. 650 milliGRAM(s) Oral every 6 hours PRN Temp greater or equal to 38C (100.4F), Mild Pain (1 - 3)  aluminum hydroxide/magnesium hydroxide/simethicone Suspension 30 milliLiter(s) Oral four times a day PRN Indigestion  ondansetron Injectable 4 milliGRAM(s) IV Push every 6 hours PRN Nausea and/or Vomiting  senna 2 Tablet(s) Oral at bedtime PRN Constipation  zolpidem 5 milliGRAM(s) Oral at bedtime PRN Insomnia      Allergies    Dilaudid (Other; Pruritus; Rash)    Intolerances        LABS:                        15.3   6.63  )-----------( 192      ( 20 Dec 2019 19:10 )             45.8     12-20    141  |  108  |  13  ----------------------------<  108<H>  4.0   |  19<L>  |  0.77    Ca    9.5      20 Dec 2019 19:10    TPro  7.0  /  Alb  4.5  /  TBili  0.4  /  DBili  x   /  AST  23  /  ALT  17  /  AlkPhos  61  12-20    PT/INR - ( 20 Dec 2019 19:10 )   PT: 11.2 sec;   INR: 0.99          PTT - ( 20 Dec 2019 19:10 )  PTT:27.8 sec      RADIOLOGY & ADDITIONAL TESTS:  Reviewed

## 2019-12-21 NOTE — PACU DISCHARGE NOTE - COMMENTS
d/c to floor care in stable condition.. no distress and discomfort.. neurovascular motor/sensory nerve status intact

## 2019-12-21 NOTE — PROGRESS NOTE ADULT - SUBJECTIVE AND OBJECTIVE BOX
Ortho Note    Pt comfortable without complaints, pain controlled  Denies CP, SOB, N/V, numbness/tingling     Vital Signs Last 24 Hrs  T(C): 36.9 (12-21-19 @ 20:33), Max: 36.9 (12-21-19 @ 20:33)  T(F): 98.4 (12-21-19 @ 20:33), Max: 98.4 (12-21-19 @ 20:33)  HR: 84 (12-21-19 @ 20:33) (72 - 84)  BP: 170/93 (12-21-19 @ 20:33) (136/87 - 170/93)  BP(mean): 101 (12-21-19 @ 16:06) (101 - 101)  RR: 16 (12-21-19 @ 20:33) (16 - 20)  SpO2: 97% (12-21-19 @ 20:33) (97% - 98%)  AVSS    General: Pt Alert and oriented, NAD  ace bandage and knee immobilizer in place  Pulses: 2+ DP pulse  Sensation: SILT over distal limb and symmetric to contralalteral side  Motor: 5+ HL/FHL/TA/GS                          13.0   7.06  )-----------( 161      ( 21 Dec 2019 06:43 )             39.7   21 Dec 2019 06:43    138    |  105    |  17     ----------------------------<  96     3.8     |  23     |  0.79       TPro  7.0    /  Alb  4.5    /  TBili  0.4    /  DBili  x      /  AST  23     /  ALT  17     /  AlkPhos  61     20 Dec 2019 19:10      A/P: 64yMale POD#0 s/p R patella ORIF   - Stable  - Pain Control  - DVT ppx: Asa  - PT, WBS: WBAT    Ortho Pager [phone]

## 2019-12-21 NOTE — PROGRESS NOTE ADULT - ASSESSMENT
64M with R patella fracture ORIF 12/21    -PT WBAT in knee brace locked in extension  -Pain control  -DVT PPX: ASA  -Dispo Planning: Home when clears PT

## 2019-12-21 NOTE — PROGRESS NOTE ADULT - SUBJECTIVE AND OBJECTIVE BOX
S: No events overnight    O:   Vital Signs Last 24 Hrs  T(C): 36.6 (21 Dec 2019 16:13), Max: 36.8 (21 Dec 2019 00:20)  T(F): 97.9 (21 Dec 2019 16:13), Max: 98.2 (21 Dec 2019 00:20)  HR: 72 (21 Dec 2019 16:13) (64 - 88)  BP: 142/90 (21 Dec 2019 16:13) (110/72 - 166/104)  BP(mean): 101 (21 Dec 2019 16:06) (81 - 105)  RR: 17 (21 Dec 2019 16:13) (12 - 38)  SpO2: 97% (21 Dec 2019 16:13) (95% - 100%)    12-21 @ 07:01  -  12-21 @ 16:36  --------------------------------------------------------  IN:    Other: 750 mL  Total IN: 750 mL    OUT:  Total OUT: 0 mL    Total NET: 750 mL    PE:  RLE   Sensation to light touch intact S/S/DP/SP/Tib, Strength EHL/FHL/TA/GS 5/5, DP 2+, Ext WWP                            13.0   7.06  )-----------( 161      ( 21 Dec 2019 06:43 )             39.7     12-21    138  |  105  |  17  ----------------------------<  96  3.8   |  23  |  0.79    Ca    8.9      21 Dec 2019 06:43    TPro  7.0  /  Alb  4.5  /  TBili  0.4  /  DBili  x   /  AST  23  /  ALT  17  /  AlkPhos  61  12-20

## 2019-12-22 LAB
ANION GAP SERPL CALC-SCNC: 9 MMOL/L — SIGNIFICANT CHANGE UP (ref 5–17)
BUN SERPL-MCNC: 8 MG/DL — SIGNIFICANT CHANGE UP (ref 7–23)
CALCIUM SERPL-MCNC: 8.7 MG/DL — SIGNIFICANT CHANGE UP (ref 8.4–10.5)
CHLORIDE SERPL-SCNC: 102 MMOL/L — SIGNIFICANT CHANGE UP (ref 96–108)
CO2 SERPL-SCNC: 23 MMOL/L — SIGNIFICANT CHANGE UP (ref 22–31)
CREAT SERPL-MCNC: 0.7 MG/DL — SIGNIFICANT CHANGE UP (ref 0.5–1.3)
GLUCOSE SERPL-MCNC: 185 MG/DL — HIGH (ref 70–99)
HCT VFR BLD CALC: 37.5 % — LOW (ref 39–50)
HGB BLD-MCNC: 13.2 G/DL — SIGNIFICANT CHANGE UP (ref 13–17)
MCHC RBC-ENTMCNC: 32.8 PG — SIGNIFICANT CHANGE UP (ref 27–34)
MCHC RBC-ENTMCNC: 35.2 GM/DL — SIGNIFICANT CHANGE UP (ref 32–36)
MCV RBC AUTO: 93.3 FL — SIGNIFICANT CHANGE UP (ref 80–100)
NRBC # BLD: 0 /100 WBCS — SIGNIFICANT CHANGE UP (ref 0–0)
PLATELET # BLD AUTO: 149 K/UL — LOW (ref 150–400)
POTASSIUM SERPL-MCNC: 3.7 MMOL/L — SIGNIFICANT CHANGE UP (ref 3.5–5.3)
POTASSIUM SERPL-SCNC: 3.7 MMOL/L — SIGNIFICANT CHANGE UP (ref 3.5–5.3)
RBC # BLD: 4.02 M/UL — LOW (ref 4.2–5.8)
RBC # FLD: 12 % — SIGNIFICANT CHANGE UP (ref 10.3–14.5)
SODIUM SERPL-SCNC: 134 MMOL/L — LOW (ref 135–145)
WBC # BLD: 9.56 K/UL — SIGNIFICANT CHANGE UP (ref 3.8–10.5)
WBC # FLD AUTO: 9.56 K/UL — SIGNIFICANT CHANGE UP (ref 3.8–10.5)

## 2019-12-22 RX ORDER — SODIUM CHLORIDE 9 MG/ML
1 INJECTION INTRAMUSCULAR; INTRAVENOUS; SUBCUTANEOUS ONCE
Refills: 0 | Status: COMPLETED | OUTPATIENT
Start: 2019-12-22 | End: 2019-12-22

## 2019-12-22 RX ORDER — ACETAMINOPHEN 500 MG
975 TABLET ORAL EVERY 8 HOURS
Refills: 0 | Status: DISCONTINUED | OUTPATIENT
Start: 2019-12-22 | End: 2019-12-24

## 2019-12-22 RX ADMIN — Medication 88 MICROGRAM(S): at 05:15

## 2019-12-22 RX ADMIN — SODIUM CHLORIDE 1 GRAM(S): 9 INJECTION INTRAMUSCULAR; INTRAVENOUS; SUBCUTANEOUS at 10:55

## 2019-12-22 RX ADMIN — Medication 2000 MILLIGRAM(S): at 03:33

## 2019-12-22 RX ADMIN — PANTOPRAZOLE SODIUM 40 MILLIGRAM(S): 20 TABLET, DELAYED RELEASE ORAL at 05:15

## 2019-12-22 RX ADMIN — Medication 325 MILLIGRAM(S): at 05:15

## 2019-12-22 RX ADMIN — OXYCODONE HYDROCHLORIDE 10 MILLIGRAM(S): 5 TABLET ORAL at 01:48

## 2019-12-22 RX ADMIN — POLYETHYLENE GLYCOL 3350 17 GRAM(S): 17 POWDER, FOR SOLUTION ORAL at 10:55

## 2019-12-22 RX ADMIN — Medication 325 MILLIGRAM(S): at 17:16

## 2019-12-22 RX ADMIN — OXYCODONE HYDROCHLORIDE 10 MILLIGRAM(S): 5 TABLET ORAL at 00:48

## 2019-12-22 RX ADMIN — Medication 5 MILLIGRAM(S): at 23:05

## 2019-12-22 RX ADMIN — OXYCODONE HYDROCHLORIDE 10 MILLIGRAM(S): 5 TABLET ORAL at 10:41

## 2019-12-22 RX ADMIN — OXYCODONE HYDROCHLORIDE 10 MILLIGRAM(S): 5 TABLET ORAL at 11:41

## 2019-12-22 RX ADMIN — Medication 650 MILLIGRAM(S): at 06:00

## 2019-12-22 RX ADMIN — AMLODIPINE BESYLATE 10 MILLIGRAM(S): 2.5 TABLET ORAL at 05:15

## 2019-12-22 RX ADMIN — OXYCODONE HYDROCHLORIDE 10 MILLIGRAM(S): 5 TABLET ORAL at 06:00

## 2019-12-22 RX ADMIN — OXYCODONE HYDROCHLORIDE 10 MILLIGRAM(S): 5 TABLET ORAL at 05:14

## 2019-12-22 RX ADMIN — Medication 650 MILLIGRAM(S): at 05:14

## 2019-12-22 RX ADMIN — Medication 5 MILLIGRAM(S): at 00:06

## 2019-12-22 NOTE — PHYSICAL THERAPY INITIAL EVALUATION ADULT - RANGE OF MOTION EXAMINATION, REHAB EVAL
R ankle WFL. R hip 2-/5. R knee not tested due to surgical event/bilateral upper extremity ROM was WNL (within normal limits)/Left LE ROM was WFL (within functional limits)

## 2019-12-22 NOTE — PHYSICAL THERAPY INITIAL EVALUATION ADULT - PLANNED THERAPY INTERVENTIONS, PT EVAL
bed mobility training/neuromuscular re-education/transfer training/balance training/gait training/strengthening

## 2019-12-22 NOTE — PHYSICAL THERAPY INITIAL EVALUATION ADULT - GENERAL OBSERVATIONS, REHAB EVAL
Pt received supine in NAD, +Ace bandage with Sriram Brace on RLE. Pt is alert and oriented x 3. He is able to follow commands. He states he is in so much pain in R knee 8/10 on movement after being given pain medication but was 10/10 without pain meds. He states no one knows the pain he is in unless that person had the same surgery as him. He declined to further ambulate stating he is giving his R knee one day rest and tomorrow he will self-discharge himself home. He refused to ambulate with RW with therapist for more stability stating it is not about stability. He states his back will be hunched if he used a RW. He declined to ambulate further with RONALD and states he will need a RW to ambulate with RN to the bathroom. Pt seen today for PT IE.

## 2019-12-22 NOTE — PHYSICAL THERAPY INITIAL EVALUATION ADULT - CRITERIA FOR SKILLED THERAPEUTIC INTERVENTIONS
therapy frequency/anticipated equipment needs at discharge/anticipated discharge recommendation/risk reduction/prevention/rehab potential/functional limitations in following categories/impairments found/predicted duration of therapy intervention

## 2019-12-22 NOTE — PHYSICAL THERAPY INITIAL EVALUATION ADULT - ADDITIONAL COMMENTS
Pt reported living alone on 6th floor apartment with elevators and 5 steps to enter. Pt reported being independent in all ADLs and ambulation without a device prior to admission.

## 2019-12-22 NOTE — PROGRESS NOTE ADULT - ASSESSMENT
64M s/p R patella fracture ORIF 12/21    -PT WBAT in knee brace locked in extension  -Pain control  -DVT PPX: ASA  -Dispo Planning: Home when clears PT

## 2019-12-22 NOTE — PROVIDER CONTACT NOTE (OTHER) - SITUATION
Patient found to have water pitcher with remnants of beer when he states " Can you re fill my water." Patient denies pain, de

## 2019-12-22 NOTE — PROVIDER CONTACT NOTE (OTHER) - ASSESSMENT
Patient denies pain, sob blurred vision. Patient resting in bed. No complaints of difficulty breathing. Not being compliant with hospital policy, and undestanding direction as per Dr Mishra

## 2019-12-22 NOTE — CHART NOTE - NSCHARTNOTEFT_GEN_A_CORE
RN alerted resident that patient had cup in his room that smelled of beer, RN confiscated cup which did appear to have beer in it. Empty beer can also found in patient's room next to his     Discussed with the patient, who RN alerted resident that patient had cup in his room that smelled of beer, RN confiscated cup which did appear to have beer in it. Empty beer can also found in patient's room next to his bed.    Discussed with the patient, who states his friend drank the beer and not him and that he hasn't drank anything.    Discussed the dangers of drinking alcohol with pain medication, especially in the context of a recent fracture and surgery.    Patient states he will not get up to use restroom without assistance and will not take any narcotics for the remainder of the evening.

## 2019-12-22 NOTE — PROGRESS NOTE ADULT - SUBJECTIVE AND OBJECTIVE BOX
S: No events overnight    O:   Vital Signs Last 24 Hrs  T(C): 36.8 (22 Dec 2019 08:27), Max: 37.1 (22 Dec 2019 05:12)  T(F): 98.3 (22 Dec 2019 08:27), Max: 98.7 (22 Dec 2019 05:12)  HR: 80 (22 Dec 2019 08:27) (64 - 91)  BP: 139/78 (22 Dec 2019 08:27) (110/72 - 170/93)  BP(mean): 101 (21 Dec 2019 16:06) (81 - 105)  ABP: --  ABP(mean): --  RR: 14 (22 Dec 2019 08:27) (12 - 38)  SpO2: 99% (22 Dec 2019 08:27) (95% - 100%)    PE:  RLE   Sensation to light touch intact S/S/DP/SP/Tib, Strength EHL/FHL/TA/GS 5/5, DP 2+, Ext WWP                          13.2   9.56  )-----------( 149      ( 22 Dec 2019 07:33 )             37.5   12-22    134<L>  |  102  |  8   ----------------------------<  185<H>  3.7   |  23  |  0.70    Ca    8.7      22 Dec 2019 07:33    TPro  7.0  /  Alb  4.5  /  TBili  0.4  /  DBili  x   /  AST  23  /  ALT  17  /  AlkPhos  61  12-20

## 2019-12-22 NOTE — PHYSICAL THERAPY INITIAL EVALUATION ADULT - MANUAL MUSCLE TESTING RESULTS, REHAB EVAL
grossly assessed due to/BUE is grossly graded 5/5. LLE is grossly graded 5/5. R hip 2-/5. R ankle is 3/5

## 2019-12-22 NOTE — PHYSICAL THERAPY INITIAL EVALUATION ADULT - PERTINENT HX OF CURRENT PROBLEM, REHAB EVAL
Per EMR, 64M presents w a R patella fracture after a fall in his apartment building. Pt states he was walking for the elevator on the wet lobby floor. Slipped and fell directly onto bent R knee.

## 2019-12-23 ENCOUNTER — TRANSCRIPTION ENCOUNTER (OUTPATIENT)
Age: 64
End: 2019-12-23

## 2019-12-23 LAB
ANION GAP SERPL CALC-SCNC: 9 MMOL/L — SIGNIFICANT CHANGE UP (ref 5–17)
BUN SERPL-MCNC: 8 MG/DL — SIGNIFICANT CHANGE UP (ref 7–23)
CALCIUM SERPL-MCNC: 8.6 MG/DL — SIGNIFICANT CHANGE UP (ref 8.4–10.5)
CHLORIDE SERPL-SCNC: 105 MMOL/L — SIGNIFICANT CHANGE UP (ref 96–108)
CO2 SERPL-SCNC: 23 MMOL/L — SIGNIFICANT CHANGE UP (ref 22–31)
CREAT SERPL-MCNC: 0.72 MG/DL — SIGNIFICANT CHANGE UP (ref 0.5–1.3)
GLUCOSE SERPL-MCNC: 119 MG/DL — HIGH (ref 70–99)
HCT VFR BLD CALC: 36.5 % — LOW (ref 39–50)
HGB BLD-MCNC: 12.6 G/DL — LOW (ref 13–17)
MCHC RBC-ENTMCNC: 32.5 PG — SIGNIFICANT CHANGE UP (ref 27–34)
MCHC RBC-ENTMCNC: 34.5 GM/DL — SIGNIFICANT CHANGE UP (ref 32–36)
MCV RBC AUTO: 94.1 FL — SIGNIFICANT CHANGE UP (ref 80–100)
NRBC # BLD: 0 /100 WBCS — SIGNIFICANT CHANGE UP (ref 0–0)
PLATELET # BLD AUTO: 143 K/UL — LOW (ref 150–400)
POTASSIUM SERPL-MCNC: 3.8 MMOL/L — SIGNIFICANT CHANGE UP (ref 3.5–5.3)
POTASSIUM SERPL-SCNC: 3.8 MMOL/L — SIGNIFICANT CHANGE UP (ref 3.5–5.3)
RBC # BLD: 3.88 M/UL — LOW (ref 4.2–5.8)
RBC # FLD: 12 % — SIGNIFICANT CHANGE UP (ref 10.3–14.5)
SODIUM SERPL-SCNC: 137 MMOL/L — SIGNIFICANT CHANGE UP (ref 135–145)
WBC # BLD: 7.78 K/UL — SIGNIFICANT CHANGE UP (ref 3.8–10.5)
WBC # FLD AUTO: 7.78 K/UL — SIGNIFICANT CHANGE UP (ref 3.8–10.5)

## 2019-12-23 PROCEDURE — 99233 SBSQ HOSP IP/OBS HIGH 50: CPT | Mod: GC

## 2019-12-23 RX ORDER — PANTOPRAZOLE SODIUM 20 MG/1
1 TABLET, DELAYED RELEASE ORAL
Qty: 30 | Refills: 0
Start: 2019-12-23 | End: 2020-01-21

## 2019-12-23 RX ORDER — SENNA PLUS 8.6 MG/1
2 TABLET ORAL
Qty: 0 | Refills: 0 | DISCHARGE
Start: 2019-12-23

## 2019-12-23 RX ORDER — ACETAMINOPHEN 500 MG
3 TABLET ORAL
Qty: 0 | Refills: 0 | DISCHARGE
Start: 2019-12-23

## 2019-12-23 RX ORDER — ASPIRIN/CALCIUM CARB/MAGNESIUM 324 MG
1 TABLET ORAL
Qty: 60 | Refills: 0
Start: 2019-12-23 | End: 2020-01-21

## 2019-12-23 RX ORDER — OXYCODONE HYDROCHLORIDE 5 MG/1
1 TABLET ORAL
Qty: 40 | Refills: 0
Start: 2019-12-23 | End: 2019-12-29

## 2019-12-23 RX ADMIN — AMLODIPINE BESYLATE 10 MILLIGRAM(S): 2.5 TABLET ORAL at 05:48

## 2019-12-23 RX ADMIN — POLYETHYLENE GLYCOL 3350 17 GRAM(S): 17 POWDER, FOR SOLUTION ORAL at 11:01

## 2019-12-23 RX ADMIN — Medication 325 MILLIGRAM(S): at 05:47

## 2019-12-23 RX ADMIN — SENNA PLUS 2 TABLET(S): 8.6 TABLET ORAL at 05:53

## 2019-12-23 RX ADMIN — Medication 88 MICROGRAM(S): at 05:49

## 2019-12-23 RX ADMIN — Medication 5 MILLIGRAM(S): at 22:01

## 2019-12-23 RX ADMIN — OXYCODONE HYDROCHLORIDE 10 MILLIGRAM(S): 5 TABLET ORAL at 12:02

## 2019-12-23 RX ADMIN — Medication 325 MILLIGRAM(S): at 17:18

## 2019-12-23 RX ADMIN — OXYCODONE HYDROCHLORIDE 10 MILLIGRAM(S): 5 TABLET ORAL at 11:02

## 2019-12-23 NOTE — PROGRESS NOTE ADULT - SUBJECTIVE AND OBJECTIVE BOX
INTERVAL/OVERNIGHT EVENTS:  Patient is now s/p ORIF R patella. EBL 100cc. Patient was found drinking beer in his room last night. Primary team made aware.    SUBJECTIVE:  Seen and examined at bedside. Reports feeling well overall. Still with severe pain in R knee with movement, but minimal pain at rest. Denies LE numbness/tingling/weakness. Denies F/C, headache, confusion, CP, palpitations, sob, cough, abd pain, N/V/D. Reports mild constipation but had a BM yesterday. Denies  symptoms, calf pain.     He says that he will not drink anymore alcohol while recovering from his surgery. He says he understands the risk of interactions of etoh with his medications (particularly opiates). Says he does not abuse etoh or medications.     12 pt ROS otherwise negative.    VITAL SIGNS:  T(F): 99.4 (12-23-19 @ 04:45)  HR: 95 (12-23-19 @ 04:45)  BP: 122/78 (12-23-19 @ 04:45)  RR: 17 (12-23-19 @ 04:45)  SpO2: 98% (12-23-19 @ 04:45)  Wt(kg): --    PHYSICAL EXAM:  General:  NAD, nontoxic appearing, WDWN  HENT:  EOMI, PERRL.  No sinus tenderness.  oropharynx WNL.  MMM  Neck:  Trachea midline.  No JVD, LAD, or thyromegaly.  Heart:  S1S2 no M/R/G, rrr  Lungs:  CTAB no wheezing, rhonchi or rales.  No accessory muscle use.  No respiratory distress.  Abdomen:  NABS.  soft, nontender, nondistended.  no guarding.  no ascites.  no organomegaly.  Vascular:  Peripheral pulses palpable  Extremities:  No edema  RLE: R knee ace wrapped, bandage C/D/I. Did not assess knee ROM. Neurovascularly intact distally. Cap refill <2 sec. WWP.   Back:  No CVA tenderness  Neuro:  AOx3, no facial asymmetry, nonfocal, no slurred speech  Skin:  No rash    MEDICATIONS  (STANDING):  acetaminophen   Tablet .. 975 milliGRAM(s) Oral every 8 hours  amLODIPine   Tablet 10 milliGRAM(s) Oral daily  aspirin enteric coated 325 milliGRAM(s) Oral two times a day  BUpivacaine liposome 1.3% Injectable (no eMAR) 20 milliLiter(s) Local Injection once  levothyroxine 88 MICROGram(s) Oral daily  pantoprazole    Tablet 40 milliGRAM(s) Oral before breakfast  polyethylene glycol 3350 17 Gram(s) Oral daily  sodium chloride 0.9%. 1000 milliLiter(s) (100 mL/Hr) IV Continuous <Continuous>    MEDICATIONS  (PRN):  aluminum hydroxide/magnesium hydroxide/simethicone Suspension 30 milliLiter(s) Oral four times a day PRN Indigestion  diazepam    Tablet 5 milliGRAM(s) Oral every 8 hours PRN spasms  morphine  - Injectable 4 milliGRAM(s) IV Push every 4 hours PRN Severe Pain (7 - 10)  morphine  - Injectable 2 milliGRAM(s) IV Push every 15 minutes PRN Severe Pain (7 - 10)  ondansetron Injectable 4 milliGRAM(s) IV Push every 6 hours PRN Nausea and/or Vomiting  oxyCODONE    IR 5 milliGRAM(s) Oral every 4 hours PRN Moderate Pain (4 - 6)  oxyCODONE    IR 10 milliGRAM(s) Oral every 4 hours PRN Severe Pain (7 - 10)  senna 2 Tablet(s) Oral at bedtime PRN Constipation  zolpidem 5 milliGRAM(s) Oral at bedtime PRN Insomnia    Allergies  Dilaudid (Other; Pruritus; Rash)  Intolerances      LABS:                          12.6   7.78  )-----------( 143      ( 23 Dec 2019 05:49 )             36.5     12-23    137  |  105  |  8   ----------------------------<  119<H>  3.8   |  23  |  0.72    Ca    8.6      23 Dec 2019 05:49      RADIOLOGY & ADDITIONAL TESTS:  All imaging reviewed.

## 2019-12-23 NOTE — DISCHARGE NOTE PROVIDER - NSDCFUADDINST_GEN_ALL_CORE_FT
Weight bear as tolerated with assistive device and with knee immobilizer on at all times.  No strenuous activity, heavy lifting, driving or returning to work until cleared by MD.  Try to have regular bowel movements, take stool softener or laxative if necessary.  May take Pepcid or Prilosec for upset stomach.  May take Aleve or Naproxen instead of Meloxicam/Celebrex.  Swelling may travel all the way down leg to foot, this is normal and will subside in a few weeks.  Call to schedule an appt with Dr. Aldana for follow up, if you have staples or sutures they will be removed in office.  Contact your doctor if you experience: fever greater than 101.5, chills, chest pain, difficulty breathing, redness or excessive drainage around the incision, other concerns.  Follow up with your primary care provider. Weight bear as tolerated with assistive device and with knee immobilizer on at all times.  No strenuous activity, heavy lifting, driving or returning to work until cleared by MD.  You may shower. Your dressing is water resistant. Do not bend your knee while in the shower.  Try to have regular bowel movements, take stool softener or laxative if necessary.  May take Pepcid or Prilosec for upset stomach.  May take Aleve or Naproxen instead of Meloxicam/Celebrex.  Swelling may travel all the way down leg to foot, this is normal and will subside in a few weeks.  Call to schedule an appt with Dr. Aldana for follow up, if you have staples or sutures they will be removed in office.  Contact your doctor if you experience: fever greater than 101.5, chills, chest pain, difficulty breathing, redness or excessive drainage around the incision, other concerns.  Follow up with your primary care provider.

## 2019-12-23 NOTE — PROGRESS NOTE ADULT - ASSESSMENT
64M s/p R patella fracture ORIF 12/21    -PT WBAT in knee brace locked in extension at all times  -Pain control  -DVT PPX: ASA  -Dispo Planning: Home when clears PT

## 2019-12-23 NOTE — PROGRESS NOTE ADULT - SUBJECTIVE AND OBJECTIVE BOX
S: No events overnight    O:   Vital Signs Last 24 Hrs  T(C): 37.4 (23 Dec 2019 04:45), Max: 37.4 (23 Dec 2019 04:45)  T(F): 99.4 (23 Dec 2019 04:45), Max: 99.4 (23 Dec 2019 04:45)  HR: 95 (23 Dec 2019 04:45) (80 - 95)  BP: 122/78 (23 Dec 2019 04:45) (122/78 - 144/81)  BP(mean): --  ABP: --  ABP(mean): --  RR: 17 (23 Dec 2019 04:45) (16 - 17)  SpO2: 98% (23 Dec 2019 04:45) (96% - 98%)    PE:  RLE   Sensation to light touch intact S/S/DP/SP/Tib, Strength EHL/FHL/TA/GS 5/5, DP 2+, Ext WWP                          12.6   7.78  )-----------( 143      ( 23 Dec 2019 05:49 )             36.5   12-23    137  |  105  |  8   ----------------------------<  119<H>  3.8   |  23  |  0.72    Ca    8.6      23 Dec 2019 05:49

## 2019-12-23 NOTE — DISCHARGE NOTE PROVIDER - HOSPITAL COURSE
Admitted    Surgery - right patella open reduction and internal fixation    Vicky-op Antibiotics    Pain control    DVT prophylaxis    OOB/Physical Therapy

## 2019-12-23 NOTE — DISCHARGE NOTE PROVIDER - CARE PROVIDER_API CALL
Federico Lowe)  Orthopaedic Surgery  [address] [place] [address]  Phone: [phone]  Fax: [phone]  Follow Up Time: Federico Lowe)  Orthopaedic Surgery  [address] [place] [address]  Phone: [phone]  Fax: [phone]  Follow Up Time: 2 weeks

## 2019-12-23 NOTE — PROGRESS NOTE ADULT - SUBJECTIVE AND OBJECTIVE BOX
POST OPERATIVE DAY #: 2  STATUS POST: s/p Right patella ORIF                     SUBJECTIVE: Patient seen and examined. States to doing well. Mild pain controlled. Patient denies any CP, SOB, fever, chills, numbness/tingling.     Pain:  well controlled      OBJECTIVE:     Vital Signs Last 24 Hrs  T(C): 37.4 (23 Dec 2019 04:45), Max: 37.4 (23 Dec 2019 04:45)  T(F): 99.4 (23 Dec 2019 04:45), Max: 99.4 (23 Dec 2019 04:45)  HR: 95 (23 Dec 2019 04:45) (80 - 95)  BP: 122/78 (23 Dec 2019 04:45) (122/78 - 144/81)  BP(mean): --  RR: 17 (23 Dec 2019 04:45) (16 - 17)  SpO2: 98% (23 Dec 2019 04:45) (96% - 98%)    Affected extremity:          Dressing: clean/dry/intact          Sensation: intact to light touch          Motor exam: 5/5 to EHL/TA/GS         warm, well-perfused; capillary refill < 3 seconds              I&O's Detail      LABS:                        12.6   7.78  )-----------( 143      ( 23 Dec 2019 05:49 )             36.5     12-23    137  |  105  |  8   ----------------------------<  119<H>  3.8   |  23  |  0.72    Ca    8.6      23 Dec 2019 05:49            MEDICATIONS:    acetaminophen   Tablet .. 975 milliGRAM(s) Oral every 8 hours  diazepam    Tablet 5 milliGRAM(s) Oral every 8 hours PRN  morphine  - Injectable 4 milliGRAM(s) IV Push every 4 hours PRN  morphine  - Injectable 2 milliGRAM(s) IV Push every 15 minutes PRN  ondansetron Injectable 4 milliGRAM(s) IV Push every 6 hours PRN  oxyCODONE    IR 5 milliGRAM(s) Oral every 4 hours PRN  oxyCODONE    IR 10 milliGRAM(s) Oral every 4 hours PRN  zolpidem 5 milliGRAM(s) Oral at bedtime PRN    aspirin enteric coated 325 milliGRAM(s) Oral two times a day      RADIOLOGY & ADDITIONAL STUDIES:    ASSESSMENT AND PLAN:     1. Analgesic pain control  2. DVT prophylaxis: ASA         SCDs     3. Continue PT   4. Weight Bearing Status:  Weight bearing as tolerated with kayleigh brace locked in extension.   5. Disposition: Home when PT cleared

## 2019-12-23 NOTE — DISCHARGE NOTE PROVIDER - NSDCMRMEDTOKEN_GEN_ALL_CORE_FT
amLODIPine:  orally   kayleigh brace locked in extension: 1 application   Synthroid:  orally   zolpidem 5 mg oral tablet: 1 tab(s) orally once a day (at bedtime), As needed, Insomnia acetaminophen 325 mg oral tablet: 3 tab(s) orally every 8 hours  amLODIPine: 1 tab(s) orally once a day  aspirin 325 mg oral delayed release tablet: 1 tab(s) orally 2 times a day  oxyCODONE 5 mg oral tablet: 1-2 tab(s) orally every 4 hours, As needed, Moderate to severe pain MDD:6  pantoprazole 40 mg oral delayed release tablet: 1 tab(s) orally once a day (before a meal)  senna oral tablet: 2 tab(s) orally once a day (at bedtime), As needed, Constipation  Synthroid: 1 tab(s) orally once a day  zolpidem 5 mg oral tablet: 1 tab(s) orally once a day (at bedtime), As needed, Insomnia amLODIPine: 1 tab(s) orally once a day  aspirin 325 mg oral delayed release tablet: 1 tab(s) orally 2 times a day  Commode: s/p left patella fracture  oxycodone-acetaminophen 5 mg-325 mg oral tablet: 1-2  tab(s) orally every 4 - 6 hours, As Needed -for moderate to severe pain MDD:10   pantoprazole 40 mg oral delayed release tablet: 1 tab(s) orally once a day (before a meal)  Please dispense 1 cane: Please dispense 1 cane  s/p left patella fracture ORIF  Please dispense 1 commode: Please dispense 1  Please dispense 1 walker: s/p Left patella ORIF  RN evaluaton 1-2 visit, home PT 3x/ week for 2 weeks: s/p left patella fracture  Rolling walker: s/p Left patella fracture  Please dispense 1  senna oral tablet: 2 tab(s) orally once a day (at bedtime), As needed, Constipation  Synthroid: 1 tab(s) orally once a day  zolpidem 5 mg oral tablet: 1 tab(s) orally once a day (at bedtime), As needed, Insomnia

## 2019-12-23 NOTE — DISCHARGE NOTE PROVIDER - NSDCCPCAREPLAN_GEN_ALL_CORE_FT
PRINCIPAL DISCHARGE DIAGNOSIS  Diagnosis: Patellar fracture  Assessment and Plan of Treatment:       SECONDARY DISCHARGE DIAGNOSES  Diagnosis: Fall  Assessment and Plan of Treatment:

## 2019-12-24 ENCOUNTER — TRANSCRIPTION ENCOUNTER (OUTPATIENT)
Age: 64
End: 2019-12-24

## 2019-12-24 VITALS
RESPIRATION RATE: 16 BRPM | SYSTOLIC BLOOD PRESSURE: 149 MMHG | DIASTOLIC BLOOD PRESSURE: 87 MMHG | TEMPERATURE: 98 F | OXYGEN SATURATION: 97 % | HEART RATE: 90 BPM

## 2019-12-24 PROCEDURE — 99232 SBSQ HOSP IP/OBS MODERATE 35: CPT

## 2019-12-24 RX ADMIN — Medication 88 MICROGRAM(S): at 04:20

## 2019-12-24 RX ADMIN — Medication 325 MILLIGRAM(S): at 04:20

## 2019-12-24 RX ADMIN — AMLODIPINE BESYLATE 10 MILLIGRAM(S): 2.5 TABLET ORAL at 04:20

## 2019-12-24 NOTE — PROGRESS NOTE ADULT - SUBJECTIVE AND OBJECTIVE BOX
S: No events overnight    O:   Vital Signs Last 24 Hrs  T(C): 37 (24 Dec 2019 04:23), Max: 37 (24 Dec 2019 04:23)  T(F): 98.6 (24 Dec 2019 04:23), Max: 98.6 (24 Dec 2019 04:23)  HR: 90 (24 Dec 2019 04:23) (84 - 90)  BP: 132/82 (24 Dec 2019 04:23) (132/82 - 143/83)  BP(mean): --  ABP: --  ABP(mean): --  RR: 16 (24 Dec 2019 04:23) (16 - 18)  SpO2: 96% (24 Dec 2019 04:23) (96% - 97%)    PE:  RLE   Sensation to light touch intact S/S/DP/SP/Tib, Strength EHL/FHL/TA/GS 5/5, DP 2+, Ext WWP                          12.6   7.78  )-----------( 143      ( 23 Dec 2019 05:49 )             36.5     12-23    137  |  105  |  8   ----------------------------<  119<H>  3.8   |  23  |  0.72    Ca    8.6      23 Dec 2019 05:49 S: Pt not wearing knee brace this am. Discussed that patient should be wearing knee brace locked in extension at all times including while in bed. Pt also non-compliant with knee brace yesterday AM. Pt understanding of risks of not wearing knee brace and chance of displacing the fracture and compromising his post-operative recovery.     O:   Vital Signs Last 24 Hrs  T(C): 37 (24 Dec 2019 04:23), Max: 37 (24 Dec 2019 04:23)  T(F): 98.6 (24 Dec 2019 04:23), Max: 98.6 (24 Dec 2019 04:23)  HR: 90 (24 Dec 2019 04:23) (84 - 90)  BP: 132/82 (24 Dec 2019 04:23) (132/82 - 143/83)  BP(mean): --  ABP: --  ABP(mean): --  RR: 16 (24 Dec 2019 04:23) (16 - 18)  SpO2: 96% (24 Dec 2019 04:23) (96% - 97%)    PE:  RLE   Sensation to light touch intact S/S/DP/SP/Tib, Strength EHL/FHL/TA/GS 5/5, DP 2+, Ext WWP                          12.6   7.78  )-----------( 143      ( 23 Dec 2019 05:49 )             36.5     12-23    137  |  105  |  8   ----------------------------<  119<H>  3.8   |  23  |  0.72    Ca    8.6      23 Dec 2019 05:49

## 2019-12-24 NOTE — PROGRESS NOTE ADULT - ASSESSMENT
64M s/p R patella fracture ORIF 12/21    -PT WBAT in knee brace locked in extension at all times  -Pain control  -DVT PPX: ASA  -Dispo Planning: Home today

## 2019-12-24 NOTE — PROGRESS NOTE ADULT - ASSESSMENT
64M with a history of HTN, hypothyroidism, nephrolithiasis who presented after fall with a R patellar fracture, now POD 3 R patellar ORIF.     # R patellar fracture, POD 3 ORIF  - Encourage OOB  - DVT ppx per primary team. Currently on  BID.   - Agree with standing bowel regimen while on opiates    # Essential hypertension  - Would continue with amlodipine 10mg qd    # Hypothyroidism  - Continue with synthroid 88mcg qd

## 2019-12-24 NOTE — PROGRESS NOTE ADULT - REASON FOR ADMISSION
R patella fracture

## 2019-12-24 NOTE — PROGRESS NOTE ADULT - SUBJECTIVE AND OBJECTIVE BOX
OVERNIGHT EVENTS:    SUBJECTIVE / INTERVAL HPI: Patient seen and examined at bedside. Feels well, passed PT with "flying colors." Going to [facility] tonight via train. Anxious to leave.     VITAL SIGNS:  Vital Signs Last 24 Hrs  T(C): 36.9 (24 Dec 2019 08:53), Max: 37 (24 Dec 2019 04:23)  T(F): 98.4 (24 Dec 2019 08:53), Max: 98.6 (24 Dec 2019 04:23)  HR: 90 (24 Dec 2019 08:53) (84 - 90)  BP: 149/87 (24 Dec 2019 08:53) (132/82 - 149/87)  BP(mean): --  RR: 16 (24 Dec 2019 08:53) (16 - 18)  SpO2: 97% (24 Dec 2019 08:53) (96% - 97%)    PHYSICAL EXAM:    General: WDWN, mildly unkempt, mild malodor  Neck: supple  Cardiovascular: +S1/S2; RRR  Respiratory: CTA b/l; no W/R/R  Gastrointestinal: soft, NT/ND;   Extremities: WWP; R knee in brace    MEDICATIONS:  MEDICATIONS  (STANDING):  acetaminophen   Tablet .. 975 milliGRAM(s) Oral every 8 hours  amLODIPine   Tablet 10 milliGRAM(s) Oral daily  aspirin enteric coated 325 milliGRAM(s) Oral two times a day  BUpivacaine liposome 1.3% Injectable (no eMAR) 20 milliLiter(s) Local Injection once  levothyroxine 88 MICROGram(s) Oral daily  pantoprazole    Tablet 40 milliGRAM(s) Oral before breakfast  polyethylene glycol 3350 17 Gram(s) Oral daily  sodium chloride 0.9%. 1000 milliLiter(s) (100 mL/Hr) IV Continuous <Continuous>    MEDICATIONS  (PRN):  aluminum hydroxide/magnesium hydroxide/simethicone Suspension 30 milliLiter(s) Oral four times a day PRN Indigestion  diazepam    Tablet 5 milliGRAM(s) Oral every 8 hours PRN spasms  morphine  - Injectable 4 milliGRAM(s) IV Push every 4 hours PRN Severe Pain (7 - 10)  morphine  - Injectable 2 milliGRAM(s) IV Push every 15 minutes PRN Severe Pain (7 - 10)  ondansetron Injectable 4 milliGRAM(s) IV Push every 6 hours PRN Nausea and/or Vomiting  oxyCODONE    IR 5 milliGRAM(s) Oral every 4 hours PRN Moderate Pain (4 - 6)  oxyCODONE    IR 10 milliGRAM(s) Oral every 4 hours PRN Severe Pain (7 - 10)  senna 2 Tablet(s) Oral at bedtime PRN Constipation  zolpidem 5 milliGRAM(s) Oral at bedtime PRN Insomnia      ALLERGIES:  Allergies    Dilaudid (Other; Pruritus; Rash)    Intolerances        LABS:                        12.6   7.78  )-----------( 143      ( 23 Dec 2019 05:49 )             36.5     12-23    137  |  105  |  8   ----------------------------<  119<H>  3.8   |  23  |  0.72    Ca    8.6      23 Dec 2019 05:49          CAPILLARY BLOOD GLUCOSE          RADIOLOGY & ADDITIONAL TESTS: Reviewed.    ASSESSMENT:    PLAN:

## 2019-12-24 NOTE — PROGRESS NOTE ADULT - SUBJECTIVE AND OBJECTIVE BOX
POST OPERATIVE DAY #:   STATUS POST: Left    Right  TKA/THR                        SUBJECTIVE: Patient seen and examined.   Denies any sob/cp/n/v/numbness or tingling in b/l     OBJECTIVE:     Vital Signs Last 24 Hrs  T(C): 36.9 (24 Dec 2019 08:53), Max: 37 (24 Dec 2019 04:23)  T(F): 98.4 (24 Dec 2019 08:53), Max: 98.6 (24 Dec 2019 04:23)  HR: 90 (24 Dec 2019 08:53) (84 - 90)  BP: 149/87 (24 Dec 2019 08:53) (132/82 - 149/87)  BP(mean): --  RR: 16 (24 Dec 2019 08:53) (16 - 18)  SpO2: 97% (24 Dec 2019 08:53) (96% - 97%)    Affected extremity:          Dressing: clean/dry/intact          Sensation: intact to light touch to patient's baseline         Motor exam: EHL/TA/GS   Pulses             I&O's Detail    23 Dec 2019 07:01  -  24 Dec 2019 07:00  --------------------------------------------------------  IN:    Oral Fluid: 120 mL  Total IN: 120 mL    OUT:    Voided: 1500 mL  Total OUT: 1500 mL    Total NET: -1380 mL          LABS:                        12.6   7.78  )-----------( 143      ( 23 Dec 2019 05:49 )             36.5     12-23    137  |  105  |  8   ----------------------------<  119<H>  3.8   |  23  |  0.72    Ca    8.6      23 Dec 2019 05:49            MEDICATIONS:    acetaminophen   Tablet .. 975 milliGRAM(s) Oral every 8 hours  diazepam    Tablet 5 milliGRAM(s) Oral every 8 hours PRN  morphine  - Injectable 4 milliGRAM(s) IV Push every 4 hours PRN  morphine  - Injectable 2 milliGRAM(s) IV Push every 15 minutes PRN  ondansetron Injectable 4 milliGRAM(s) IV Push every 6 hours PRN  oxyCODONE    IR 5 milliGRAM(s) Oral every 4 hours PRN  oxyCODONE    IR 10 milliGRAM(s) Oral every 4 hours PRN  zolpidem 5 milliGRAM(s) Oral at bedtime PRN    aspirin enteric coated 325 milliGRAM(s) Oral two times a day        ASSESSMENT AND PLAN: 65yo Male s/p     1. Analgesic pain control  2. DVT prophylaxis: ASA      HSQ       Coumadin      Lovenox      SCDs      Other:   3. Weight Bearing Status:  Weight bearing as tolerated       NWB         Partial Weight Bearing  4. Disposition: Home          Subacute Rehab      pending PT evaluation POST OPERATIVE DAY #: 3  STATUS POST: Right patella ORIF                       SUBJECTIVE: Patient seen and examined. Pt. is very anxious about going home today.   Denies any sob/cp/n/v/numbness or tingling in b/l les.     OBJECTIVE:     Vital Signs Last 24 Hrs  T(C): 36.9 (24 Dec 2019 08:53), Max: 37 (24 Dec 2019 04:23)  T(F): 98.4 (24 Dec 2019 08:53), Max: 98.6 (24 Dec 2019 04:23)  HR: 90 (24 Dec 2019 08:53) (84 - 90)  BP: 149/87 (24 Dec 2019 08:53) (132/82 - 149/87)  BP(mean): --  RR: 16 (24 Dec 2019 08:53) (16 - 18)  SpO2: 97% (24 Dec 2019 08:53) (96% - 97%)    Affected extremity: right le          Dressing: clean/dry/intact in Sriram brace locked in extension         Sensation: intact to light touch to patient's baseline         Motor exam: EHL/TA/GS 5/5 Pulses 2+             I&O's Detail    23 Dec 2019 07:01  -  24 Dec 2019 07:00  --------------------------------------------------------  IN:    Oral Fluid: 120 mL  Total IN: 120 mL    OUT:    Voided: 1500 mL  Total OUT: 1500 mL    Total NET: -1380 mL          LABS:                        12.6   7.78  )-----------( 143      ( 23 Dec 2019 05:49 )             36.5     12-23    137  |  105  |  8   ----------------------------<  119<H>  3.8   |  23  |  0.72    Ca    8.6      23 Dec 2019 05:49            MEDICATIONS:    acetaminophen   Tablet .. 975 milliGRAM(s) Oral every 8 hours  diazepam    Tablet 5 milliGRAM(s) Oral every 8 hours PRN  morphine  - Injectable 4 milliGRAM(s) IV Push every 4 hours PRN  morphine  - Injectable 2 milliGRAM(s) IV Push every 15 minutes PRN  ondansetron Injectable 4 milliGRAM(s) IV Push every 6 hours PRN  oxyCODONE    IR 5 milliGRAM(s) Oral every 4 hours PRN  oxyCODONE    IR 10 milliGRAM(s) Oral every 4 hours PRN  zolpidem 5 milliGRAM(s) Oral at bedtime PRN    aspirin enteric coated 325 milliGRAM(s) Oral two times a day        ASSESSMENT AND PLAN: 63yo Male s/p right patella orif     1. Analgesic pain control  2. DVT prophylaxis: ASA        3. Weight Bearing Status:  Weight bearing as tolerated         4. Disposition: Home  today

## 2019-12-24 NOTE — DISCHARGE NOTE NURSING/CASE MANAGEMENT/SOCIAL WORK - PATIENT PORTAL LINK FT
You can access the FollowMyHealth Patient Portal offered by [facility] by registering at the following website: [email or web address]. By joining [facility]’s FollowMyHealth portal, you will also be able to view your health information using other applications (apps) compatible with our system.

## 2019-12-27 ENCOUNTER — INBOUND DOCUMENT (OUTPATIENT)
Age: 64
End: 2019-12-27

## 2019-12-27 DIAGNOSIS — S82.001A UNSPECIFIED FRACTURE OF RIGHT PATELLA, INITIAL ENCOUNTER FOR CLOSED FRACTURE: ICD-10-CM

## 2019-12-27 DIAGNOSIS — I10 ESSENTIAL (PRIMARY) HYPERTENSION: ICD-10-CM

## 2019-12-27 DIAGNOSIS — Z79.899 OTHER LONG TERM (CURRENT) DRUG THERAPY: ICD-10-CM

## 2019-12-27 DIAGNOSIS — E03.9 HYPOTHYROIDISM, UNSPECIFIED: ICD-10-CM
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
## 2019-12-31 PROCEDURE — C1713: CPT

## 2019-12-31 PROCEDURE — 76000 FLUOROSCOPY <1 HR PHYS/QHP: CPT

## 2019-12-31 PROCEDURE — 96374 THER/PROPH/DIAG INJ IV PUSH: CPT

## 2019-12-31 PROCEDURE — 86850 RBC ANTIBODY SCREEN: CPT

## 2019-12-31 PROCEDURE — 73560 X-RAY EXAM OF KNEE 1 OR 2: CPT

## 2019-12-31 PROCEDURE — 36415 COLL VENOUS BLD VENIPUNCTURE: CPT

## 2019-12-31 PROCEDURE — 71045 X-RAY EXAM CHEST 1 VIEW: CPT

## 2019-12-31 PROCEDURE — 73700 CT LOWER EXTREMITY W/O DYE: CPT

## 2019-12-31 PROCEDURE — 85610 PROTHROMBIN TIME: CPT

## 2019-12-31 PROCEDURE — 97116 GAIT TRAINING THERAPY: CPT

## 2019-12-31 PROCEDURE — C1769: CPT

## 2019-12-31 PROCEDURE — 97162 PT EVAL MOD COMPLEX 30 MIN: CPT

## 2019-12-31 PROCEDURE — 85025 COMPLETE CBC W/AUTO DIFF WBC: CPT

## 2019-12-31 PROCEDURE — 99285 EMERGENCY DEPT VISIT HI MDM: CPT | Mod: 25

## 2019-12-31 PROCEDURE — 86900 BLOOD TYPING SEROLOGIC ABO: CPT

## 2019-12-31 PROCEDURE — 80048 BASIC METABOLIC PNL TOTAL CA: CPT

## 2019-12-31 PROCEDURE — 81003 URINALYSIS AUTO W/O SCOPE: CPT

## 2019-12-31 PROCEDURE — 85027 COMPLETE CBC AUTOMATED: CPT

## 2019-12-31 PROCEDURE — 73630 X-RAY EXAM OF FOOT: CPT

## 2019-12-31 PROCEDURE — 86803 HEPATITIS C AB TEST: CPT

## 2019-12-31 PROCEDURE — 80053 COMPREHEN METABOLIC PANEL: CPT

## 2019-12-31 PROCEDURE — 86901 BLOOD TYPING SEROLOGIC RH(D): CPT

## 2019-12-31 PROCEDURE — 85730 THROMBOPLASTIN TIME PARTIAL: CPT

## 2020-01-03 ENCOUNTER — OUTPATIENT (OUTPATIENT)
Dept: OUTPATIENT SERVICES | Facility: HOSPITAL | Age: 65
LOS: 1 days | End: 2020-01-03
Payer: MEDICAID

## 2020-01-03 DIAGNOSIS — Z90.89 ACQUIRED ABSENCE OF OTHER ORGANS: Chronic | ICD-10-CM

## 2020-01-03 DIAGNOSIS — S82.891A OTHER FRACTURE OF RIGHT LOWER LEG, INITIAL ENCOUNTER FOR CLOSED FRACTURE: Chronic | ICD-10-CM

## 2020-01-03 PROCEDURE — 73560 X-RAY EXAM OF KNEE 1 OR 2: CPT | Mod: 26,RT

## 2020-01-03 PROCEDURE — 73560 X-RAY EXAM OF KNEE 1 OR 2: CPT

## 2020-01-20 VITALS
SYSTOLIC BLOOD PRESSURE: 129 MMHG | HEIGHT: 71 IN | HEART RATE: 108 BPM | RESPIRATION RATE: 18 BRPM | DIASTOLIC BLOOD PRESSURE: 83 MMHG | OXYGEN SATURATION: 96 % | TEMPERATURE: 98 F | WEIGHT: 169.98 LBS

## 2020-01-20 PROCEDURE — 73560 X-RAY EXAM OF KNEE 1 OR 2: CPT | Mod: 26,RT

## 2020-01-20 RX ORDER — ACETAMINOPHEN 500 MG
650 TABLET ORAL ONCE
Refills: 0 | Status: COMPLETED | OUTPATIENT
Start: 2020-01-20 | End: 2020-01-20

## 2020-01-20 RX ORDER — IBUPROFEN 200 MG
400 TABLET ORAL ONCE
Refills: 0 | Status: COMPLETED | OUTPATIENT
Start: 2020-01-20 | End: 2020-01-20

## 2020-01-20 RX ORDER — OXYCODONE AND ACETAMINOPHEN 5; 325 MG/1; MG/1
1 TABLET ORAL ONCE
Refills: 0 | Status: DISCONTINUED | OUTPATIENT
Start: 2020-01-20 | End: 2020-01-20

## 2020-01-20 RX ADMIN — OXYCODONE AND ACETAMINOPHEN 1 TABLET(S): 5; 325 TABLET ORAL at 23:23

## 2020-01-20 RX ADMIN — OXYCODONE AND ACETAMINOPHEN 1 TABLET(S): 5; 325 TABLET ORAL at 22:50

## 2020-01-20 NOTE — ED PROVIDER NOTE - PHYSICAL EXAMINATION
CONST: nontoxic NAD speaking in full sentences  HEAD: atraumatic  EYES: conjunctivae clear  ENT: mmm  NECK: supple  CARD: rrr no murmurs  CHEST: ctab no r/r/w, no stridor/retractions/tripoding  EXT: +healing R anterior knee surgical wound, +reportedly unchanged diffuse R knee pain, symmetric distal pulses intact  SKIN: warm, dry, no rash, no pedal edema, cap refill <2sec  NEURO: a+ox3, 5/5 strength x4, gross sensation intact x4

## 2020-01-20 NOTE — ED ADULT TRIAGE NOTE - CHIEF COMPLAINT QUOTE
right knee pain accidentally bump his right knee on the dark about 2 hours ago, s/p right knee surgery about 3 weels ago

## 2020-01-20 NOTE — ED ADULT NURSE NOTE - OBJECTIVE STATEMENT
pt states he had right patellar surgery 3 weeks ago at this hospital.   pt states about 2 hours ago he accidentally hit his right anterior knee against a cabinet.  pt arrived with RLE in knee immobilizer.   pt c/o increased right knee pain and swelling since injury.  dried blood noted on right anterior knee, incision appears well approximated, no active bleeding.  +tenderness and swelling right anterior knee.  2+ b/l dp pulses.

## 2020-01-20 NOTE — ED PROVIDER NOTE - CLINICAL SUMMARY MEDICAL DECISION MAKING FREE TEXT BOX
avss. nontoxic. NAD. found to have bent R patellar screw w/ increased fracture displacement on xray. ortho consulted. preop labs/ekg done.    dispo: pending ortho reccs, likely admission

## 2020-01-20 NOTE — ED PROVIDER NOTE - OBJECTIVE STATEMENT
64M R traumatic patellar fx s/p orif (12/21/2019), now c/o recurrent R knee pain s/p accidentally hitting anterior knee against edge of wooden dresser while wearing soft brace and attempting to ambulate to bathroom in the dark w/o walker. no significant swelling. weight bearing afterwards w/ walker/brace. no head/neck injury. no fall. no prodrome. 64M R traumatic patellar fx s/p orif (12/21/2019), now c/o recurrent R knee pain s/p accidentally hitting anterior knee against edge of wooden dresser while wearing soft brace and attempting to ambulate to bathroom in the dark w/o walker. no significant swelling. weight bearing afterwards w/ walker/brace. no head/neck injury. no fall. no prodrome.    ortho: jam

## 2020-01-21 ENCOUNTER — INPATIENT (INPATIENT)
Facility: HOSPITAL | Age: 65
LOS: 2 days | Discharge: HOME HEALTH | DRG: 467 | End: 2020-01-24
Attending: ORTHOPAEDIC SURGERY | Admitting: ORTHOPAEDIC SURGERY
Payer: MEDICAID
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
## 2020-01-21 DIAGNOSIS — S82.891A OTHER FRACTURE OF RIGHT LOWER LEG, INITIAL ENCOUNTER FOR CLOSED FRACTURE: Chronic | ICD-10-CM
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
## 2020-01-21 DIAGNOSIS — Z90.89 ACQUIRED ABSENCE OF OTHER ORGANS: Chronic | ICD-10-CM
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
## 2020-01-21 DIAGNOSIS — S82.035K: ICD-10-CM

## 2020-01-21 LAB
ANION GAP SERPL CALC-SCNC: 6 MMOL/L — SIGNIFICANT CHANGE UP (ref 5–17)
ANION GAP SERPL CALC-SCNC: 9 MMOL/L — SIGNIFICANT CHANGE UP (ref 5–17)
APPEARANCE UR: CLEAR — SIGNIFICANT CHANGE UP
APTT BLD: 26 SEC — LOW (ref 27.5–36.3)
BASOPHILS # BLD AUTO: 0.02 K/UL — SIGNIFICANT CHANGE UP (ref 0–0.2)
BASOPHILS NFR BLD AUTO: 0.2 % — SIGNIFICANT CHANGE UP (ref 0–2)
BILIRUB UR-MCNC: NEGATIVE — SIGNIFICANT CHANGE UP
BLD GP AB SCN SERPL QL: NEGATIVE — SIGNIFICANT CHANGE UP
BUN SERPL-MCNC: 10 MG/DL — SIGNIFICANT CHANGE UP (ref 7–23)
BUN SERPL-MCNC: 13 MG/DL — SIGNIFICANT CHANGE UP (ref 7–23)
CALCIUM SERPL-MCNC: 8.6 MG/DL — SIGNIFICANT CHANGE UP (ref 8.4–10.5)
CALCIUM SERPL-MCNC: 9.1 MG/DL — SIGNIFICANT CHANGE UP (ref 8.4–10.5)
CHLORIDE SERPL-SCNC: 105 MMOL/L — SIGNIFICANT CHANGE UP (ref 96–108)
CHLORIDE SERPL-SCNC: 106 MMOL/L — SIGNIFICANT CHANGE UP (ref 96–108)
CO2 SERPL-SCNC: 22 MMOL/L — SIGNIFICANT CHANGE UP (ref 22–31)
CO2 SERPL-SCNC: 23 MMOL/L — SIGNIFICANT CHANGE UP (ref 22–31)
COLOR SPEC: YELLOW — SIGNIFICANT CHANGE UP
CREAT SERPL-MCNC: 0.69 MG/DL — SIGNIFICANT CHANGE UP (ref 0.5–1.3)
CREAT SERPL-MCNC: 0.74 MG/DL — SIGNIFICANT CHANGE UP (ref 0.5–1.3)
DIFF PNL FLD: NEGATIVE — SIGNIFICANT CHANGE UP
EOSINOPHIL # BLD AUTO: 0.08 K/UL — SIGNIFICANT CHANGE UP (ref 0–0.5)
EOSINOPHIL NFR BLD AUTO: 0.8 % — SIGNIFICANT CHANGE UP (ref 0–6)
GLUCOSE SERPL-MCNC: 106 MG/DL — HIGH (ref 70–99)
GLUCOSE SERPL-MCNC: 106 MG/DL — HIGH (ref 70–99)
GLUCOSE UR QL: NEGATIVE — SIGNIFICANT CHANGE UP
HCT VFR BLD CALC: 37.6 % — LOW (ref 39–50)
HCT VFR BLD CALC: 39.5 % — SIGNIFICANT CHANGE UP (ref 39–50)
HGB BLD-MCNC: 13 G/DL — SIGNIFICANT CHANGE UP (ref 13–17)
HGB BLD-MCNC: 13.3 G/DL — SIGNIFICANT CHANGE UP (ref 13–17)
IMM GRANULOCYTES NFR BLD AUTO: 0.3 % — SIGNIFICANT CHANGE UP (ref 0–1.5)
INR BLD: 1 — SIGNIFICANT CHANGE UP (ref 0.88–1.16)
KETONES UR-MCNC: NEGATIVE — SIGNIFICANT CHANGE UP
LEUKOCYTE ESTERASE UR-ACNC: NEGATIVE — SIGNIFICANT CHANGE UP
LYMPHOCYTES # BLD AUTO: 1.65 K/UL — SIGNIFICANT CHANGE UP (ref 1–3.3)
LYMPHOCYTES # BLD AUTO: 16.9 % — SIGNIFICANT CHANGE UP (ref 13–44)
MCHC RBC-ENTMCNC: 32 PG — SIGNIFICANT CHANGE UP (ref 27–34)
MCHC RBC-ENTMCNC: 32.7 PG — SIGNIFICANT CHANGE UP (ref 27–34)
MCHC RBC-ENTMCNC: 33.7 GM/DL — SIGNIFICANT CHANGE UP (ref 32–36)
MCHC RBC-ENTMCNC: 34.6 GM/DL — SIGNIFICANT CHANGE UP (ref 32–36)
MCV RBC AUTO: 94.5 FL — SIGNIFICANT CHANGE UP (ref 80–100)
MCV RBC AUTO: 95 FL — SIGNIFICANT CHANGE UP (ref 80–100)
MONOCYTES # BLD AUTO: 0.74 K/UL — SIGNIFICANT CHANGE UP (ref 0–0.9)
MONOCYTES NFR BLD AUTO: 7.6 % — SIGNIFICANT CHANGE UP (ref 2–14)
MRSA PCR RESULT.: NEGATIVE — SIGNIFICANT CHANGE UP
NEUTROPHILS # BLD AUTO: 7.27 K/UL — SIGNIFICANT CHANGE UP (ref 1.8–7.4)
NEUTROPHILS NFR BLD AUTO: 74.2 % — SIGNIFICANT CHANGE UP (ref 43–77)
NITRITE UR-MCNC: NEGATIVE — SIGNIFICANT CHANGE UP
NRBC # BLD: 0 /100 WBCS — SIGNIFICANT CHANGE UP (ref 0–0)
NRBC # BLD: 0 /100 WBCS — SIGNIFICANT CHANGE UP (ref 0–0)
PH UR: 6 — SIGNIFICANT CHANGE UP (ref 5–8)
PLATELET # BLD AUTO: 150 K/UL — SIGNIFICANT CHANGE UP (ref 150–400)
PLATELET # BLD AUTO: 162 K/UL — SIGNIFICANT CHANGE UP (ref 150–400)
POTASSIUM SERPL-MCNC: 3.8 MMOL/L — SIGNIFICANT CHANGE UP (ref 3.5–5.3)
POTASSIUM SERPL-MCNC: 4.1 MMOL/L — SIGNIFICANT CHANGE UP (ref 3.5–5.3)
POTASSIUM SERPL-SCNC: 3.8 MMOL/L — SIGNIFICANT CHANGE UP (ref 3.5–5.3)
POTASSIUM SERPL-SCNC: 4.1 MMOL/L — SIGNIFICANT CHANGE UP (ref 3.5–5.3)
PROT UR-MCNC: NEGATIVE MG/DL — SIGNIFICANT CHANGE UP
PROTHROM AB SERPL-ACNC: 11.4 SEC — SIGNIFICANT CHANGE UP (ref 10–12.9)
RAPID RVP RESULT: SIGNIFICANT CHANGE UP
RBC # BLD: 3.98 M/UL — LOW (ref 4.2–5.8)
RBC # BLD: 4.16 M/UL — LOW (ref 4.2–5.8)
RBC # FLD: 12.1 % — SIGNIFICANT CHANGE UP (ref 10.3–14.5)
RBC # FLD: 12.5 % — SIGNIFICANT CHANGE UP (ref 10.3–14.5)
RH IG SCN BLD-IMP: NEGATIVE — SIGNIFICANT CHANGE UP
S AUREUS DNA NOSE QL NAA+PROBE: NEGATIVE — SIGNIFICANT CHANGE UP
SODIUM SERPL-SCNC: 133 MMOL/L — LOW (ref 135–145)
SODIUM SERPL-SCNC: 138 MMOL/L — SIGNIFICANT CHANGE UP (ref 135–145)
SP GR SPEC: <=1.005 — SIGNIFICANT CHANGE UP (ref 1–1.03)
UROBILINOGEN FLD QL: 0.2 E.U./DL — SIGNIFICANT CHANGE UP
WBC # BLD: 6.97 K/UL — SIGNIFICANT CHANGE UP (ref 3.8–10.5)
WBC # BLD: 9.79 K/UL — SIGNIFICANT CHANGE UP (ref 3.8–10.5)
WBC # FLD AUTO: 6.97 K/UL — SIGNIFICANT CHANGE UP (ref 3.8–10.5)
WBC # FLD AUTO: 9.79 K/UL — SIGNIFICANT CHANGE UP (ref 3.8–10.5)

## 2020-01-21 PROCEDURE — 73560 X-RAY EXAM OF KNEE 1 OR 2: CPT | Mod: 26,RT

## 2020-01-21 PROCEDURE — 93010 ELECTROCARDIOGRAM REPORT: CPT

## 2020-01-21 PROCEDURE — 73110 X-RAY EXAM OF WRIST: CPT | Mod: 26,LT

## 2020-01-21 PROCEDURE — 73700 CT LOWER EXTREMITY W/O DYE: CPT | Mod: 26,RT

## 2020-01-21 PROCEDURE — 99285 EMERGENCY DEPT VISIT HI MDM: CPT

## 2020-01-21 PROCEDURE — 27524 TREAT KNEECAP FRACTURE: CPT | Mod: 78,RT

## 2020-01-21 PROCEDURE — 71045 X-RAY EXAM CHEST 1 VIEW: CPT | Mod: 26

## 2020-01-21 PROCEDURE — 73130 X-RAY EXAM OF HAND: CPT | Mod: 26,LT

## 2020-01-21 PROCEDURE — 99254 IP/OBS CNSLTJ NEW/EST MOD 60: CPT

## 2020-01-21 RX ORDER — CEFAZOLIN SODIUM 1 G
2000 VIAL (EA) INJECTION EVERY 8 HOURS
Refills: 0 | Status: COMPLETED | OUTPATIENT
Start: 2020-01-21 | End: 2020-01-22

## 2020-01-21 RX ORDER — ZOLPIDEM TARTRATE 10 MG/1
5 TABLET ORAL AT BEDTIME
Refills: 0 | Status: DISCONTINUED | OUTPATIENT
Start: 2020-01-21 | End: 2020-01-24

## 2020-01-21 RX ORDER — ASPIRIN/CALCIUM CARB/MAGNESIUM 324 MG
325 TABLET ORAL
Refills: 0 | Status: DISCONTINUED | OUTPATIENT
Start: 2020-01-21 | End: 2020-01-24

## 2020-01-21 RX ORDER — OXYCODONE HYDROCHLORIDE 5 MG/1
10 TABLET ORAL EVERY 4 HOURS
Refills: 0 | Status: DISCONTINUED | OUTPATIENT
Start: 2020-01-21 | End: 2020-01-22

## 2020-01-21 RX ORDER — ASCORBIC ACID 60 MG
500 TABLET,CHEWABLE ORAL DAILY
Refills: 0 | Status: DISCONTINUED | OUTPATIENT
Start: 2020-01-21 | End: 2020-01-24

## 2020-01-21 RX ORDER — HYDROMORPHONE HYDROCHLORIDE 2 MG/ML
0.5 INJECTION INTRAMUSCULAR; INTRAVENOUS; SUBCUTANEOUS EVERY 4 HOURS
Refills: 0 | Status: DISCONTINUED | OUTPATIENT
Start: 2020-01-21 | End: 2020-01-22

## 2020-01-21 RX ORDER — LEVOTHYROXINE SODIUM 125 MCG
50 TABLET ORAL DAILY
Refills: 0 | Status: DISCONTINUED | OUTPATIENT
Start: 2020-01-21 | End: 2020-01-22

## 2020-01-21 RX ORDER — ONDANSETRON 8 MG/1
4 TABLET, FILM COATED ORAL EVERY 6 HOURS
Refills: 0 | Status: DISCONTINUED | OUTPATIENT
Start: 2020-01-21 | End: 2020-01-24

## 2020-01-21 RX ORDER — AMLODIPINE BESYLATE 2.5 MG/1
2.5 TABLET ORAL DAILY
Refills: 0 | Status: DISCONTINUED | OUTPATIENT
Start: 2020-01-21 | End: 2020-01-22

## 2020-01-21 RX ORDER — SENNA PLUS 8.6 MG/1
2 TABLET ORAL AT BEDTIME
Refills: 0 | Status: DISCONTINUED | OUTPATIENT
Start: 2020-01-21 | End: 2020-01-24

## 2020-01-21 RX ORDER — ACETAMINOPHEN 500 MG
650 TABLET ORAL EVERY 4 HOURS
Refills: 0 | Status: DISCONTINUED | OUTPATIENT
Start: 2020-01-21 | End: 2020-01-22

## 2020-01-21 RX ORDER — SODIUM CHLORIDE 9 MG/ML
1000 INJECTION, SOLUTION INTRAVENOUS
Refills: 0 | Status: DISCONTINUED | OUTPATIENT
Start: 2020-01-21 | End: 2020-01-24

## 2020-01-21 RX ORDER — MAGNESIUM HYDROXIDE 400 MG/1
30 TABLET, CHEWABLE ORAL DAILY
Refills: 0 | Status: DISCONTINUED | OUTPATIENT
Start: 2020-01-21 | End: 2020-01-24

## 2020-01-21 RX ORDER — MORPHINE SULFATE 50 MG/1
2 CAPSULE, EXTENDED RELEASE ORAL
Refills: 0 | Status: DISCONTINUED | OUTPATIENT
Start: 2020-01-21 | End: 2020-01-22

## 2020-01-21 RX ORDER — CEFAZOLIN SODIUM 1 G
2000 VIAL (EA) INJECTION EVERY 8 HOURS
Refills: 0 | Status: DISCONTINUED | OUTPATIENT
Start: 2020-01-21 | End: 2020-01-21

## 2020-01-21 RX ORDER — POLYETHYLENE GLYCOL 3350 17 G/17G
17 POWDER, FOR SOLUTION ORAL DAILY
Refills: 0 | Status: DISCONTINUED | OUTPATIENT
Start: 2020-01-21 | End: 2020-01-24

## 2020-01-21 RX ORDER — CHOLECALCIFEROL (VITAMIN D3) 125 MCG
1000 CAPSULE ORAL EVERY OTHER DAY
Refills: 0 | Status: DISCONTINUED | OUTPATIENT
Start: 2020-01-21 | End: 2020-01-24

## 2020-01-21 RX ORDER — PANTOPRAZOLE SODIUM 20 MG/1
40 TABLET, DELAYED RELEASE ORAL
Refills: 0 | Status: DISCONTINUED | OUTPATIENT
Start: 2020-01-21 | End: 2020-01-24

## 2020-01-21 RX ORDER — OXYCODONE HYDROCHLORIDE 5 MG/1
5 TABLET ORAL EVERY 4 HOURS
Refills: 0 | Status: DISCONTINUED | OUTPATIENT
Start: 2020-01-21 | End: 2020-01-22

## 2020-01-21 RX ADMIN — MORPHINE SULFATE 2 MILLIGRAM(S): 50 CAPSULE, EXTENDED RELEASE ORAL at 22:20

## 2020-01-21 RX ADMIN — OXYCODONE HYDROCHLORIDE 10 MILLIGRAM(S): 5 TABLET ORAL at 10:16

## 2020-01-21 RX ADMIN — Medication 50 MICROGRAM(S): at 05:54

## 2020-01-21 RX ADMIN — OXYCODONE HYDROCHLORIDE 10 MILLIGRAM(S): 5 TABLET ORAL at 23:51

## 2020-01-21 RX ADMIN — OXYCODONE HYDROCHLORIDE 10 MILLIGRAM(S): 5 TABLET ORAL at 05:53

## 2020-01-21 RX ADMIN — Medication 200 MILLIGRAM(S): at 05:53

## 2020-01-21 RX ADMIN — AMLODIPINE BESYLATE 2.5 MILLIGRAM(S): 2.5 TABLET ORAL at 05:54

## 2020-01-21 RX ADMIN — OXYCODONE HYDROCHLORIDE 10 MILLIGRAM(S): 5 TABLET ORAL at 10:48

## 2020-01-21 RX ADMIN — MORPHINE SULFATE 2 MILLIGRAM(S): 50 CAPSULE, EXTENDED RELEASE ORAL at 22:00

## 2020-01-21 RX ADMIN — OXYCODONE HYDROCHLORIDE 10 MILLIGRAM(S): 5 TABLET ORAL at 22:51

## 2020-01-21 RX ADMIN — OXYCODONE HYDROCHLORIDE 10 MILLIGRAM(S): 5 TABLET ORAL at 04:53

## 2020-01-21 RX ADMIN — Medication 2000 MILLIGRAM(S): at 23:00

## 2020-01-21 RX ADMIN — SENNA PLUS 2 TABLET(S): 8.6 TABLET ORAL at 22:51

## 2020-01-21 NOTE — H&P ADULT - NSICDXPASTSURGICALHX_GEN_ALL_CORE_FT
PAST SURGICAL HISTORY:  Ankle fracture, right 5 yrs ago tx @[facility] with plates and sharron    S/P tonsillectomy

## 2020-01-21 NOTE — PATIENT PROFILE ADULT - NSPROPASSIVESMOKEEXPOSURE_GEN_A_NUR
October 11, 2019      Jessica Soto MD  [address]  [place] [address]           [facility]  [address]  [place] [address]  Phone: [phone]  Fax: [phone]          Patient: Dragan Che   MR Number: [id number]   YOB: 2016   Date of Visit: 10/10/2019       Dear Dr. Jessica Soto:    Thank you for referring Dragan Che to me for evaluation. Attached you will find relevant portions of my assessment and plan of care.    If you have questions, please do not hesitate to call me. I look forward to following Dragan Che along with you.    Sincerely,    Enriqueta Sales, NP    Enclosure  CC:  No Recipients    If you would like to receive this communication electronically, please contact [email or web address] or [phone] to request more information on [employer] access.    For providers and/or their staff who would like to refer a patient to [facility], please contact us through our one-stop-shop provider referral line, [facility], at [phone].    If you feel you have received this communication in error or would no longer like to receive these types of communications, please e-mail [email or web address]          No

## 2020-01-21 NOTE — H&P ADULT - NSHPPHYSICALEXAM_GEN_ALL_CORE
Gen: Awake and alert, unkempt  NAD  R patella with healing scar with some scab  No erythema, ecchymosis of draining wound  No fluctuance  Sensation intact s/s/sp/dp/t and symmetric   Motor strength EHL/FHL/TA/GS 5/5 and symmetric   Limited ROM 2/2 pain  2+ DP/PT  Toes warm/perfused   Secondary survey negative

## 2020-01-21 NOTE — PROGRESS NOTE ADULT - SUBJECTIVE AND OBJECTIVE BOX
Ortho Post Op Check    Procedure: R patella ORIF & cylinder cast application   Surgeon: Jovan    Pt comfortable without complaints, pain controlled  Denies CP, SOB, N/V, numbness/tingling     Vital Signs Last 24 Hrs  T(C): 36.1 (01-21-20 @ 18:30), Max: 36.1 (01-21-20 @ 18:30)  T(F): 97 (01-21-20 @ 18:30), Max: 97 (01-21-20 @ 18:30)  HR: 70 (01-21-20 @ 19:45) (66 - 94)  BP: 132/76 (01-21-20 @ 19:30) (105/57 - 132/76)  BP(mean): 99 (01-21-20 @ 19:30) (75 - 99)  RR: 17 (01-21-20 @ 19:45) (12 - 20)  SpO2: 100% (01-21-20 @ 19:45) (97% - 100%)  AVSS    General: Pt Alert and oriented, NAD  cylinder cast in place  Pulses: toes wwp  Sensation: silt toes  Motor: firing EDL/FHL, wiggling toes                           13.0   6.97  )-----------( 150      ( 21 Jan 2020 07:42 )             37.6   21 Jan 2020 07:42    138    |  106    |  10     ----------------------------<  106    3.8     |  23     |  0.69           Post-op X-Ray: hardware in place    A/P: 64yMale POD#0 s/p above procedure  - Stable  - Pain Control  - DVT ppx:  BID  - Post op abx: Ancef  - PT, WBS: WBAT RLE    Ortho Pager [phone]

## 2020-01-21 NOTE — BRIEF OPERATIVE NOTE - NSICDXBRIEFPOSTOP_GEN_ALL_CORE_FT
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

## 2020-01-21 NOTE — H&P ADULT - HISTORY OF PRESENT ILLNESS
64M hx HTN, hypothyroid, kidney stones s/p R patella ORIF 12/21/2019 for R transverse patella fracture presenting to the ED with R knee pain. Reports that he was trying to ambulate in the dark without his walker and accidentally hit his knees against a wooden dresser. Did not fall, hit his head or LOC. No other injuries. However, abrasions noted on bilateral knees and knuckles. Ambulates with walker and cane and ?compliance with kayleigh brace. Denies numbness or tingling.

## 2020-01-21 NOTE — CONSULT NOTE ADULT - ASSESSMENT
64 year old M presenting for ORIF revision, medicine consulted for pre-op eval.     1) Pre-op eval: medically optimized for OR, EKG reviewed, can perform >4METS, RCRI class I risk, no hx of CKD, CHF, IDDM, CVA, proceed to surgery  2) HTN: c/w norvasc (home dose is 10 mg, please increase post-op)  3) Hypothyroidism: c/w synthroid (home dose is 88 mcg, please increase)  4) Viral URI: robitussin, lozenges,

## 2020-01-21 NOTE — PROGRESS NOTE ADULT - SUBJECTIVE AND OBJECTIVE BOX
Ortho Note    63 y/o male with right patella fx, s/p previous right patella ORIF 12/2019  Patient seen and examined at bedside   c/o pain at the right knee s/p injury at home yesterday   Denies CP, SOB, N/V, numbness/tingling     Vital Signs Last 24 Hrs  T(C): --  T(F): --  HR: --  BP: --  BP(mean): --  RR: --  SpO2: --  AVSS    General: Pt Alert and oriented, NAD  Sriram brace intact  right knee incision well healed  moderate right knee swelling   Pulses: 2+ DP pulses bilateral LE   Sensation: intact to light touch   Motor: EHL/FHL/TA/GS 5/5 bilaterally                           13.0   6.97  )-----------( 150      ( 21 Jan 2020 07:42 )             37.6   21 Jan 2020 07:42    138    |  106    |  10     ----------------------------<  106    3.8     |  23     |  0.69     Ca    8.6        21 Jan 2020 07:42        A/P: 64yMale with refracture of right patella s/p previous ORIF december 2019     - labs stable, VSS   - CT right knee pending   - Pain Control: IV and PO PRN   - DVT ppx: pending possible OR   - PT, WBS: NWB RLE  - NPO in anticipation of OR  - medical clearance pending     Ortho Pager [phone]

## 2020-01-21 NOTE — CONSULT NOTE ADULT - SUBJECTIVE AND OBJECTIVE BOX
Patient is a 64y old  Male who presents with a chief complaint of R patella fracture (2020 12:59)      HPI:  64M hx HTN, hypothyroid, kidney stones s/p R patella ORIF 2019 for R transverse patella fracture presenting to the ED with R knee pain. Reports that he was trying to ambulate in the dark without his walker and accidentally hit his knees against a wooden dresser. Did not fall, hit his head or LOC. No other injuries. However, abrasions noted on bilateral knees and knuckles. Ambulates with walker and cane and ?compliance with kayleigh brace. Denies numbness or tingling. (2020 01:12)    Known to me from prior admission. Hit knee on dresser in darkness. Needed repeat surgery later this week anyway. Pain controlled. Denies chest pain, SOB. Can perform > 4 METS. No hx of CKD, CVA, IDDM, CHF.     REVIEW OF SYSTEMS negative      General:	    Skin/Breast:  	  Ophthalmologic:  	  ENMT:	    Respiratory and Thorax:  	  Cardiovascular:	    Gastrointestinal:	    Genitourinary:	    Musculoskeletal:	    Neurological:	    Psychiatric:	    Hematology/Lymphatics:	    Endocrine:	    Allergic/Immunologic:	    Allergies    No Known Allergies    Intolerances        Home Medications:  amLODIPine: 1 tab(s) orally once a day (23 Dec 2019 10:11)  senna oral tablet: 2 tab(s) orally once a day (at bedtime), As needed, Constipation (23 Dec 2019 10:11)  Synthroid: 1 tab(s) orally once a day (23 Dec 2019 10:11)  zolpidem 5 mg oral tablet: 1 tab(s) orally once a day (at bedtime), As needed, Insomnia (17 Dec 2016 07:59)      PAST MEDICAL & SURGICAL HISTORY:  Kidney stones  Hypothyroid  Hypertension  Ankle fracture, right: 5 yrs ago tx @[facility] with plates and rodes  S/P tonsillectomy      FAMILY HISTORY:  Family history of acute myocardial infarction (Father): Father  at the age of 69      SOCIAL HISTORY: +EtOH (but states he's been "On the wagon" since last admission)      Vital Signs Last 24 Hrs  T(C): 36.4 (2020 13:51), Max: 36.5 (2020 21:51)  T(F): 97.6 (2020 13:51), Max: 97.7 (2020 21:51)  HR: 70 (2020 13:51) (70 - 108)  BP: 138/63 (2020 13:51) (122/67 - 138/83)  BP(mean): --  RR: 19 (2020 13:51) (16 - 19)  SpO2: 95% (2020 13:51) (95% - 99%)   I&O's Detail    Daily Height in cm: 180.34 (2020 21:51)    Daily     Physical Exam:   GEN: NAD, AAOx3  HEENT: MMM, no icterus  CV: S1 S2 RRR, no MRG  Lung: CTAB  Abd: soft NT ND +BS  Ext: no c/c/e  Neuro: no focal neuro deficit    MEDICATIONS  (STANDING):  amLODIPine   Tablet 2.5 milliGRAM(s) Oral daily  lactated ringers. 1000 milliLiter(s) (120 mL/Hr) IV Continuous <Continuous>  levothyroxine 50 MICROGram(s) Oral daily  pantoprazole    Tablet 40 milliGRAM(s) Oral before breakfast  polyethylene glycol 3350 17 Gram(s) Oral daily    MEDICATIONS  (PRN):  acetaminophen   Tablet .. 650 milliGRAM(s) Oral every 4 hours PRN Temp greater or equal to 38C (100.4F), Mild Pain (1 - 3)  aluminum hydroxide/magnesium hydroxide/simethicone Suspension 20 milliLiter(s) Oral four times a day PRN Indigestion  guaiFENesin   Syrup  (Sugar-Free) 200 milliGRAM(s) Oral every 6 hours PRN cold  HYDROmorphone  Injectable 0.5 milliGRAM(s) IV Push every 4 hours PRN breakthrough pain  magnesium hydroxide Suspension 30 milliLiter(s) Oral daily PRN Constipation--2nd Line  ondansetron Injectable 4 milliGRAM(s) IV Push every 6 hours PRN Nausea and/or Vomiting  oxyCODONE    IR 5 milliGRAM(s) Oral every 4 hours PRN Moderate Pain (4 - 6)  oxyCODONE    IR 10 milliGRAM(s) Oral every 4 hours PRN Severe Pain (7 - 10)  senna 2 Tablet(s) Oral at bedtime PRN Constipation--1st Line  zolpidem 5 milliGRAM(s) Oral at bedtime PRN Insomnia                LABS:                        13.0   6.97  )-----------( 150      ( 2020 07:42 )             37.6     01-    138  |  106  |  10  ----------------------------<  106<H>  3.8   |  23  |  0.69    Ca    8.6      2020 07:42          PT/INR - ( 2020 02:03 )   PT: 11.4 sec;   INR: 1.00          PTT - ( 2020 02:03 )  PTT:26.0 sec  CAPILLARY BLOOD GLUCOSE          RADIOLOGY & ADDITIONAL STUDIES:

## 2020-01-21 NOTE — BRIEF OPERATIVE NOTE - NSICDXBRIEFPREOP_GEN_ALL_CORE_FT
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

## 2020-01-21 NOTE — H&P ADULT - PROBLEM SELECTOR PLAN 1
Admit  Pain control  Home meds  Preop labs  Medical optimization   Book  Consent for revision R patella ORIF

## 2020-01-21 NOTE — H&P ADULT - ASSESSMENT
64M s/p R patella ORIF 12/2019 now presenting s/p ?atraumatic R patella transverse refracture with hardware loosening

## 2020-01-21 NOTE — H&P ADULT - ATTENDING COMMENTS
I saw and discussed the patients injury with him. His story did change as to wether or not he fell. We discussed revision ORIF and cylinder cast placement and he agreed to proceed

## 2020-01-22 ENCOUNTER — TRANSCRIPTION ENCOUNTER (OUTPATIENT)
Age: 65
End: 2020-01-22

## 2020-01-22 LAB
ANION GAP SERPL CALC-SCNC: 13 MMOL/L — SIGNIFICANT CHANGE UP (ref 5–17)
BUN SERPL-MCNC: 8 MG/DL — SIGNIFICANT CHANGE UP (ref 7–23)
CALCIUM SERPL-MCNC: 8.5 MG/DL — SIGNIFICANT CHANGE UP (ref 8.4–10.5)
CHLORIDE SERPL-SCNC: 103 MMOL/L — SIGNIFICANT CHANGE UP (ref 96–108)
CO2 SERPL-SCNC: 22 MMOL/L — SIGNIFICANT CHANGE UP (ref 22–31)
CREAT SERPL-MCNC: 0.71 MG/DL — SIGNIFICANT CHANGE UP (ref 0.5–1.3)
GLUCOSE SERPL-MCNC: 129 MG/DL — HIGH (ref 70–99)
HCT VFR BLD CALC: 38.9 % — LOW (ref 39–50)
HGB BLD-MCNC: 12.8 G/DL — LOW (ref 13–17)
MCHC RBC-ENTMCNC: 31.8 PG — SIGNIFICANT CHANGE UP (ref 27–34)
MCHC RBC-ENTMCNC: 32.9 GM/DL — SIGNIFICANT CHANGE UP (ref 32–36)
MCV RBC AUTO: 96.8 FL — SIGNIFICANT CHANGE UP (ref 80–100)
NRBC # BLD: 0 /100 WBCS — SIGNIFICANT CHANGE UP (ref 0–0)
PLATELET # BLD AUTO: 148 K/UL — LOW (ref 150–400)
POTASSIUM SERPL-MCNC: 3.6 MMOL/L — SIGNIFICANT CHANGE UP (ref 3.5–5.3)
POTASSIUM SERPL-SCNC: 3.6 MMOL/L — SIGNIFICANT CHANGE UP (ref 3.5–5.3)
RBC # BLD: 4.02 M/UL — LOW (ref 4.2–5.8)
RBC # FLD: 12.3 % — SIGNIFICANT CHANGE UP (ref 10.3–14.5)
SODIUM SERPL-SCNC: 138 MMOL/L — SIGNIFICANT CHANGE UP (ref 135–145)
WBC # BLD: 8.24 K/UL — SIGNIFICANT CHANGE UP (ref 3.8–10.5)
WBC # FLD AUTO: 8.24 K/UL — SIGNIFICANT CHANGE UP (ref 3.8–10.5)

## 2020-01-22 PROCEDURE — 99233 SBSQ HOSP IP/OBS HIGH 50: CPT

## 2020-01-22 RX ORDER — HYDROMORPHONE HYDROCHLORIDE 2 MG/ML
1 INJECTION INTRAMUSCULAR; INTRAVENOUS; SUBCUTANEOUS EVERY 4 HOURS
Refills: 0 | Status: DISCONTINUED | OUTPATIENT
Start: 2020-01-22 | End: 2020-01-22

## 2020-01-22 RX ORDER — AMLODIPINE BESYLATE 2.5 MG/1
10 TABLET ORAL DAILY
Refills: 0 | Status: DISCONTINUED | OUTPATIENT
Start: 2020-01-22 | End: 2020-01-24

## 2020-01-22 RX ORDER — CYCLOBENZAPRINE HYDROCHLORIDE 10 MG/1
5 TABLET, FILM COATED ORAL THREE TIMES A DAY
Refills: 0 | Status: DISCONTINUED | OUTPATIENT
Start: 2020-01-22 | End: 2020-01-24

## 2020-01-22 RX ORDER — HYDROMORPHONE HYDROCHLORIDE 2 MG/ML
1 INJECTION INTRAMUSCULAR; INTRAVENOUS; SUBCUTANEOUS
Refills: 0 | Status: DISCONTINUED | OUTPATIENT
Start: 2020-01-22 | End: 2020-01-24

## 2020-01-22 RX ORDER — HYDROMORPHONE HYDROCHLORIDE 2 MG/ML
1 INJECTION INTRAMUSCULAR; INTRAVENOUS; SUBCUTANEOUS
Refills: 0 | Status: DISCONTINUED | OUTPATIENT
Start: 2020-01-22 | End: 2020-01-22

## 2020-01-22 RX ORDER — HYDROMORPHONE HYDROCHLORIDE 2 MG/ML
6 INJECTION INTRAMUSCULAR; INTRAVENOUS; SUBCUTANEOUS EVERY 4 HOURS
Refills: 0 | Status: DISCONTINUED | OUTPATIENT
Start: 2020-01-22 | End: 2020-01-24

## 2020-01-22 RX ORDER — GABAPENTIN 400 MG/1
300 CAPSULE ORAL THREE TIMES A DAY
Refills: 0 | Status: DISCONTINUED | OUTPATIENT
Start: 2020-01-22 | End: 2020-01-24

## 2020-01-22 RX ORDER — LEVOTHYROXINE SODIUM 125 MCG
88 TABLET ORAL DAILY
Refills: 0 | Status: DISCONTINUED | OUTPATIENT
Start: 2020-01-22 | End: 2020-01-24

## 2020-01-22 RX ORDER — HYDROMORPHONE HYDROCHLORIDE 2 MG/ML
0.5 INJECTION INTRAMUSCULAR; INTRAVENOUS; SUBCUTANEOUS ONCE
Refills: 0 | Status: DISCONTINUED | OUTPATIENT
Start: 2020-01-22 | End: 2020-01-22

## 2020-01-22 RX ORDER — ACETAMINOPHEN 500 MG
975 TABLET ORAL EVERY 8 HOURS
Refills: 0 | Status: DISCONTINUED | OUTPATIENT
Start: 2020-01-22 | End: 2020-01-24

## 2020-01-22 RX ORDER — CELECOXIB 200 MG/1
200 CAPSULE ORAL
Refills: 0 | Status: DISCONTINUED | OUTPATIENT
Start: 2020-01-22 | End: 2020-01-24

## 2020-01-22 RX ORDER — HYDROMORPHONE HYDROCHLORIDE 2 MG/ML
4 INJECTION INTRAMUSCULAR; INTRAVENOUS; SUBCUTANEOUS EVERY 4 HOURS
Refills: 0 | Status: DISCONTINUED | OUTPATIENT
Start: 2020-01-22 | End: 2020-01-24

## 2020-01-22 RX ADMIN — HYDROMORPHONE HYDROCHLORIDE 0.5 MILLIGRAM(S): 2 INJECTION INTRAMUSCULAR; INTRAVENOUS; SUBCUTANEOUS at 04:41

## 2020-01-22 RX ADMIN — HYDROMORPHONE HYDROCHLORIDE 4 MILLIGRAM(S): 2 INJECTION INTRAMUSCULAR; INTRAVENOUS; SUBCUTANEOUS at 19:32

## 2020-01-22 RX ADMIN — HYDROMORPHONE HYDROCHLORIDE 1 MILLIGRAM(S): 2 INJECTION INTRAMUSCULAR; INTRAVENOUS; SUBCUTANEOUS at 17:00

## 2020-01-22 RX ADMIN — GABAPENTIN 300 MILLIGRAM(S): 400 CAPSULE ORAL at 22:37

## 2020-01-22 RX ADMIN — HYDROMORPHONE HYDROCHLORIDE 0.5 MILLIGRAM(S): 2 INJECTION INTRAMUSCULAR; INTRAVENOUS; SUBCUTANEOUS at 04:56

## 2020-01-22 RX ADMIN — HYDROMORPHONE HYDROCHLORIDE 0.5 MILLIGRAM(S): 2 INJECTION INTRAMUSCULAR; INTRAVENOUS; SUBCUTANEOUS at 11:16

## 2020-01-22 RX ADMIN — Medication 2000 MILLIGRAM(S): at 07:23

## 2020-01-22 RX ADMIN — CELECOXIB 200 MILLIGRAM(S): 200 CAPSULE ORAL at 17:39

## 2020-01-22 RX ADMIN — Medication 500 MILLIGRAM(S): at 11:20

## 2020-01-22 RX ADMIN — HYDROMORPHONE HYDROCHLORIDE 1 MILLIGRAM(S): 2 INJECTION INTRAMUSCULAR; INTRAVENOUS; SUBCUTANEOUS at 07:49

## 2020-01-22 RX ADMIN — HYDROMORPHONE HYDROCHLORIDE 4 MILLIGRAM(S): 2 INJECTION INTRAMUSCULAR; INTRAVENOUS; SUBCUTANEOUS at 18:57

## 2020-01-22 RX ADMIN — Medication 325 MILLIGRAM(S): at 17:01

## 2020-01-22 RX ADMIN — Medication 975 MILLIGRAM(S): at 23:39

## 2020-01-22 RX ADMIN — HYDROMORPHONE HYDROCHLORIDE 0.5 MILLIGRAM(S): 2 INJECTION INTRAMUSCULAR; INTRAVENOUS; SUBCUTANEOUS at 11:50

## 2020-01-22 RX ADMIN — AMLODIPINE BESYLATE 10 MILLIGRAM(S): 2.5 TABLET ORAL at 13:03

## 2020-01-22 RX ADMIN — CELECOXIB 200 MILLIGRAM(S): 200 CAPSULE ORAL at 17:01

## 2020-01-22 RX ADMIN — POLYETHYLENE GLYCOL 3350 17 GRAM(S): 17 POWDER, FOR SOLUTION ORAL at 11:20

## 2020-01-22 RX ADMIN — HYDROMORPHONE HYDROCHLORIDE 1 MILLIGRAM(S): 2 INJECTION INTRAMUSCULAR; INTRAVENOUS; SUBCUTANEOUS at 13:20

## 2020-01-22 RX ADMIN — OXYCODONE HYDROCHLORIDE 10 MILLIGRAM(S): 5 TABLET ORAL at 03:01

## 2020-01-22 RX ADMIN — OXYCODONE HYDROCHLORIDE 10 MILLIGRAM(S): 5 TABLET ORAL at 11:20

## 2020-01-22 RX ADMIN — Medication 325 MILLIGRAM(S): at 05:33

## 2020-01-22 RX ADMIN — HYDROMORPHONE HYDROCHLORIDE 1 MILLIGRAM(S): 2 INJECTION INTRAMUSCULAR; INTRAVENOUS; SUBCUTANEOUS at 13:03

## 2020-01-22 RX ADMIN — GABAPENTIN 300 MILLIGRAM(S): 400 CAPSULE ORAL at 14:53

## 2020-01-22 RX ADMIN — HYDROMORPHONE HYDROCHLORIDE 1 MILLIGRAM(S): 2 INJECTION INTRAMUSCULAR; INTRAVENOUS; SUBCUTANEOUS at 17:18

## 2020-01-22 RX ADMIN — OXYCODONE HYDROCHLORIDE 10 MILLIGRAM(S): 5 TABLET ORAL at 10:43

## 2020-01-22 RX ADMIN — HYDROMORPHONE HYDROCHLORIDE 4 MILLIGRAM(S): 2 INJECTION INTRAMUSCULAR; INTRAVENOUS; SUBCUTANEOUS at 15:30

## 2020-01-22 RX ADMIN — HYDROMORPHONE HYDROCHLORIDE 4 MILLIGRAM(S): 2 INJECTION INTRAMUSCULAR; INTRAVENOUS; SUBCUTANEOUS at 22:38

## 2020-01-22 RX ADMIN — AMLODIPINE BESYLATE 2.5 MILLIGRAM(S): 2.5 TABLET ORAL at 05:33

## 2020-01-22 RX ADMIN — Medication 1000 UNIT(S): at 11:20

## 2020-01-22 RX ADMIN — HYDROMORPHONE HYDROCHLORIDE 0.5 MILLIGRAM(S): 2 INJECTION INTRAMUSCULAR; INTRAVENOUS; SUBCUTANEOUS at 00:47

## 2020-01-22 RX ADMIN — OXYCODONE HYDROCHLORIDE 10 MILLIGRAM(S): 5 TABLET ORAL at 04:01

## 2020-01-22 RX ADMIN — Medication 975 MILLIGRAM(S): at 22:38

## 2020-01-22 RX ADMIN — HYDROMORPHONE HYDROCHLORIDE 4 MILLIGRAM(S): 2 INJECTION INTRAMUSCULAR; INTRAVENOUS; SUBCUTANEOUS at 23:38

## 2020-01-22 RX ADMIN — HYDROMORPHONE HYDROCHLORIDE 0.5 MILLIGRAM(S): 2 INJECTION INTRAMUSCULAR; INTRAVENOUS; SUBCUTANEOUS at 00:32

## 2020-01-22 RX ADMIN — Medication 50 MICROGRAM(S): at 05:33

## 2020-01-22 RX ADMIN — HYDROMORPHONE HYDROCHLORIDE 4 MILLIGRAM(S): 2 INJECTION INTRAMUSCULAR; INTRAVENOUS; SUBCUTANEOUS at 14:53

## 2020-01-22 RX ADMIN — Medication 200 MILLIGRAM(S): at 00:35

## 2020-01-22 NOTE — DISCHARGE NOTE PROVIDER - NSDCFUADDINST_GEN_ALL_CORE_FT
Weight bear as tolerated with assistive device.  Keep cast clean and dry.  No strenuous activity, heavy lifting, driving or returning to work until cleared by MD.  Try to have regular bowel movements, take stool softener or laxative if necessary.  May take Pepcid or Prilosec for upset stomach.  May take Aleve or Naproxen instead of Meloxicam/Celebrex.  Swelling may travel all the way down leg to foot, this is normal and will subside in a few weeks.  Call to schedule an appt with Dr. Aldana for follow up, if you have staples or sutures they will be removed in office.  Contact your doctor if you experience: fever greater than 101.5, chills, chest pain, difficulty breathing, redness or excessive drainage around the incision, other concerns.  Follow up with your primary care provider. Weight bear as tolerated with assistive device with cast in place.  Keep cast clean and dry.  No strenuous activity, heavy lifting, driving or returning to work until cleared by MD.  Try to have regular bowel movements, take stool softener or laxative if necessary.  May take Pepcid or Prilosec for upset stomach.  May take Aleve or Naproxen instead of Meloxicam/Celebrex.  Swelling may travel all the way down leg to foot, this is normal and will subside in a few weeks.  Call to schedule an appt with Dr. Aldana for follow up, if you have staples or sutures they will be removed in office.  Contact your doctor if you experience: fever greater than 101.5, chills, chest pain, difficulty breathing, redness or excessive drainage around the incision, other concerns.  Follow up with your primary care provider.

## 2020-01-22 NOTE — DISCHARGE NOTE PROVIDER - NSDCHHENCOUNTER_GEN_ALL_CORE
24-Jan-2020 Island Pedicle Flap Text: The defect edges were debeveled with a #15 scalpel blade.  Given the location of the defect, shape of the defect and the proximity to free margins an island pedicle advancement flap was deemed most appropriate.  Using a sterile surgical marker, an appropriate advancement flap was drawn incorporating the defect, outlining the appropriate donor tissue and placing the expected incisions within the relaxed skin tension lines where possible.    The area thus outlined was incised deep to adipose tissue with a #15 scalpel blade.  The skin margins were undermined to an appropriate distance in all directions around the primary defect and laterally outward around the island pedicle utilizing iris scissors.  There was minimal undermining beneath the pedicle flap.

## 2020-01-22 NOTE — PROGRESS NOTE ADULT - SUBJECTIVE AND OBJECTIVE BOX
POST OPERATIVE DAY #:  1  STATUS POST: Right revision ORIF patella fracture, MYCHAL, Long Leg cast application                 SUBJECTIVE: Patient seen and examined. States to pain to right knee that is not fully controlled with medication. Patient denies any CP, SOB, fever, chills, numbnses/tingling.     Pain:  well controlled      OBJECTIVE:     Vital Signs Last 24 Hrs  T(C): 37.2 (22 Jan 2020 08:51), Max: 37.3 (22 Jan 2020 05:00)  T(F): 99 (22 Jan 2020 08:51), Max: 99.1 (22 Jan 2020 05:00)  HR: 81 (22 Jan 2020 08:51) (66 - 94)  BP: 160/75 (22 Jan 2020 08:51) (105/57 - 160/75)  BP(mean): 100 (21 Jan 2020 21:30) (75 - 104)  RR: 18 (22 Jan 2020 08:51) (12 - 22)  SpO2: 97% (22 Jan 2020 08:51) (95% - 100%)    Affected extremity:          Dressing: clean/dry/intact, cylinder intact         Sensation: intact to light touch          Motor exam:  5/5 to EHL/TA/GS         warm, well-perfused; capillary refill < 3 seconds              I&O's Detail    21 Jan 2020 07:01  -  22 Jan 2020 07:00  --------------------------------------------------------  IN:    lactated ringers.: 240 mL    Oral Fluid: 750 mL  Total IN: 990 mL    OUT:    Voided: 950 mL  Total OUT: 950 mL    Total NET: 40 mL      22 Jan 2020 07:01  -  22 Jan 2020 13:42  --------------------------------------------------------  IN:    Oral Fluid: 480 mL  Total IN: 480 mL    OUT:    Voided: 1100 mL  Total OUT: 1100 mL    Total NET: -620 mL          LABS:                        12.8   8.24  )-----------( 148      ( 22 Jan 2020 07:44 )             38.9     01-22    138  |  103  |  8   ----------------------------<  129<H>  3.6   |  22  |  0.71    Ca    8.5      22 Jan 2020 07:45      PT/INR - ( 21 Jan 2020 02:03 )   PT: 11.4 sec;   INR: 1.00          PTT - ( 21 Jan 2020 02:03 )  PTT:26.0 sec  Urinalysis Basic - ( 21 Jan 2020 02:03 )    Color: Yellow / Appearance: Clear / SG: <=1.005 / pH: x  Gluc: x / Ketone: NEGATIVE  / Bili: Negative / Urobili: 0.2 E.U./dL   Blood: x / Protein: NEGATIVE mg/dL / Nitrite: NEGATIVE   Leuk Esterase: NEGATIVE / RBC: x / WBC x   Sq Epi: x / Non Sq Epi: x / Bacteria: x        MEDICATIONS:    acetaminophen   Tablet .. 975 milliGRAM(s) Oral every 8 hours  celecoxib 200 milliGRAM(s) Oral two times a day  cyclobenzaprine 5 milliGRAM(s) Oral three times a day PRN  gabapentin 300 milliGRAM(s) Oral three times a day  HYDROmorphone   Tablet 4 milliGRAM(s) Oral every 4 hours PRN  HYDROmorphone   Tablet 6 milliGRAM(s) Oral every 4 hours PRN  HYDROmorphone  Injectable 1 milliGRAM(s) IV Push every 3 hours PRN  LORazepam   Injectable 2 milliGRAM(s) IV Push every 1 hour PRN  ondansetron Injectable 4 milliGRAM(s) IV Push every 6 hours PRN  zolpidem 5 milliGRAM(s) Oral at bedtime PRN    aspirin enteric coated 325 milliGRAM(s) Oral two times a day      RADIOLOGY & ADDITIONAL STUDIES:    ASSESSMENT AND PLAN: 64y F with Right revision ORIF of patella fracture    1. Analgesic pain control, pain management consult placed  2. DVT prophylaxis: ASA            SCDs       3. Continue PT, continue cylinder cast  4. Weight Bearing Status:  Weight bearing as tolerated      5. Disposition: Pending

## 2020-01-22 NOTE — DISCHARGE NOTE PROVIDER - NSDCFUSCHEDAPPT_GEN_ALL_CORE_FT
PATY VIVAS ; 02/11/2020 ; [facility] [address]  PATY VIVAS ; 02/11/2020 ; [facility]  [address] [place] [address] PATY VIVAS ; 02/11/2020 ; [facility] [address]  PATY VIVAS ; 02/11/2020 ; [facility]  [address] [place] [address]

## 2020-01-22 NOTE — DISCHARGE NOTE PROVIDER - CARE PROVIDER_API CALL
Federico Lowe)  Orthopaedic Surgery  [address] [place] [address]  Phone: [phone]  Fax: [phone]  Follow Up Time:

## 2020-01-22 NOTE — PROGRESS NOTE ADULT - ASSESSMENT
64y m s/p R patella revision ORIF 1/21/2020    -PT/WBAT in long leg cast  -Pain control  -DVT PPX: ASA  -Dispo Planning: TBD

## 2020-01-22 NOTE — DISCHARGE NOTE PROVIDER - NSDCMRMEDTOKEN_GEN_ALL_CORE_FT
amLODIPine: 1 tab(s) orally once a day  aspirin 325 mg oral delayed release tablet: 1 tab(s) orally 2 times a day  Commode: s/p left patella fracture  oxycodone-acetaminophen 5 mg-325 mg oral tablet: 1-2  tab(s) orally every 4 - 6 hours, As Needed -for moderate to severe pain MDD:10   pantoprazole 40 mg oral delayed release tablet: 1 tab(s) orally once a day (before a meal)  Please dispense 1 cane: Please dispense 1 cane  s/p left patella fracture ORIF  Please dispense 1 commode: Please dispense 1  Please dispense 1 walker: s/p Left patella ORIF  RN evaluaton 1-2 visit, home PT 3x/ week for 2 weeks: s/p left patella fracture  Rolling walker: s/p Left patella fracture  Please dispense 1  senna oral tablet: 2 tab(s) orally once a day (at bedtime), As needed, Constipation  Synthroid: 1 tab(s) orally once a day  zolpidem 5 mg oral tablet: 1 tab(s) orally once a day (at bedtime), As needed, Insomnia amLODIPine: 1 tab(s) orally once a day  aspirin 325 mg oral delayed release tablet: 1 tab(s) orally 2 times a day for 30 days postop  celecoxib 200 mg oral capsule: 1 cap(s) orally 2 times a day  gabapentin 300 mg oral capsule: 1 cap(s) orally 3 times a day  Home Physical Therapy : RN evaluation 1-2 visits   Home PT: 3x weely x2 weeks     s/p right patella ORIF 1/21/2020  HYDROmorphone 4 mg oral tablet: 1 tab(s) orally every 4 hours, As Needed - for severe pain MDD:6   pantoprazole 40 mg oral delayed release tablet: 1 tab(s) orally once a day (before a meal)  polyethylene glycol 3350 oral powder for reconstitution: 17 gram(s) orally once a day  senna oral tablet: 2 tab(s) orally once a day (at bedtime), As needed, Constipation  Synthroid: 1 tab(s) orally once a day  zolpidem 5 mg oral tablet: 1 tab(s) orally once a day (at bedtime), As needed, Insomnia

## 2020-01-22 NOTE — DISCHARGE NOTE PROVIDER - HOSPITAL COURSE
Admitted    Surgery - right patella revision ORIF    Vicky-op Antibiotics    Pain control    DVT prophylaxis    OOB/Physical Therapy Admitted    Surgery - right patella revision ORIF    Vicky-op Antibiotics    Pain control    DVT prophylaxis    OOB/Physical Therapy     Medicine consulted for comanagement     Pain management consult

## 2020-01-22 NOTE — PROGRESS NOTE ADULT - SUBJECTIVE AND OBJECTIVE BOX
OVERNIGHT EVENTS:    SUBJECTIVE / INTERVAL HPI: Patient seen and examined at bedside. Saying a mantra to himself to deal with the pain. Asking for dilaudid and asking for pain management to see him.     VITAL SIGNS:  Vital Signs Last 24 Hrs  T(C): 37.2 (22 Jan 2020 08:51), Max: 37.3 (22 Jan 2020 05:00)  T(F): 99 (22 Jan 2020 08:51), Max: 99.1 (22 Jan 2020 05:00)  HR: 81 (22 Jan 2020 08:51) (66 - 94)  BP: 160/75 (22 Jan 2020 08:51) (105/57 - 160/75)  BP(mean): 100 (21 Jan 2020 21:30) (75 - 104)  RR: 18 (22 Jan 2020 08:51) (12 - 22)  SpO2: 97% (22 Jan 2020 08:51) (95% - 100%)    Physical Exam:   GEN: NAD, AAOx3, malodorous  HEENT: MMM,   CV: S1 S2 RRR,  Lung: CTAB  Abd: soft NT ND   Ext: RLE in cast from thigh to foot    MEDICATIONS:  MEDICATIONS  (STANDING):  acetaminophen   Tablet .. 975 milliGRAM(s) Oral every 8 hours  amLODIPine   Tablet 10 milliGRAM(s) Oral daily  ascorbic acid 500 milliGRAM(s) Oral daily  aspirin enteric coated 325 milliGRAM(s) Oral two times a day  celecoxib 200 milliGRAM(s) Oral two times a day  cholecalciferol 1000 Unit(s) Oral every other day  gabapentin 300 milliGRAM(s) Oral three times a day  lactated ringers. 1000 milliLiter(s) (120 mL/Hr) IV Continuous <Continuous>  levothyroxine 88 MICROGram(s) Oral daily  pantoprazole    Tablet 40 milliGRAM(s) Oral before breakfast  polyethylene glycol 3350 17 Gram(s) Oral daily    MEDICATIONS  (PRN):  aluminum hydroxide/magnesium hydroxide/simethicone Suspension 20 milliLiter(s) Oral four times a day PRN Indigestion  cyclobenzaprine 5 milliGRAM(s) Oral three times a day PRN Muscle Spasm  guaiFENesin   Syrup  (Sugar-Free) 200 milliGRAM(s) Oral every 6 hours PRN cold  HYDROmorphone   Tablet 4 milliGRAM(s) Oral every 4 hours PRN Moderate Pain (4 - 6)  HYDROmorphone   Tablet 6 milliGRAM(s) Oral every 4 hours PRN Severe Pain (7 - 10)  HYDROmorphone  Injectable 1 milliGRAM(s) IV Push every 3 hours PRN breakthrough pain  LORazepam   Injectable 2 milliGRAM(s) IV Push every 1 hour PRN CIWA-Ar score 8 or greater  magnesium hydroxide Suspension 30 milliLiter(s) Oral daily PRN Constipation--2nd Line  ondansetron Injectable 4 milliGRAM(s) IV Push every 6 hours PRN Nausea and/or Vomiting  senna 2 Tablet(s) Oral at bedtime PRN Constipation--1st Line  zolpidem 5 milliGRAM(s) Oral at bedtime PRN Insomnia      ALLERGIES:  Allergies    No Known Allergies    Intolerances        LABS:                        12.8   8.24  )-----------( 148      ( 22 Jan 2020 07:44 )             38.9     01-22    138  |  103  |  8   ----------------------------<  129<H>  3.6   |  22  |  0.71    Ca    8.5      22 Jan 2020 07:45      PT/INR - ( 21 Jan 2020 02:03 )   PT: 11.4 sec;   INR: 1.00          PTT - ( 21 Jan 2020 02:03 )  PTT:26.0 sec  Urinalysis Basic - ( 21 Jan 2020 02:03 )    Color: Yellow / Appearance: Clear / SG: <=1.005 / pH: x  Gluc: x / Ketone: NEGATIVE  / Bili: Negative / Urobili: 0.2 E.U./dL   Blood: x / Protein: NEGATIVE mg/dL / Nitrite: NEGATIVE   Leuk Esterase: NEGATIVE / RBC: x / WBC x   Sq Epi: x / Non Sq Epi: x / Bacteria: x      CAPILLARY BLOOD GLUCOSE          RADIOLOGY & ADDITIONAL TESTS: Reviewed.    ASSESSMENT:    PLAN:

## 2020-01-22 NOTE — CONSULT NOTE ADULT - SUBJECTIVE AND OBJECTIVE BOX
Pain Management Consult Note - [facility] [phone]      Chief Complaint: R knee Pain      HPI: Patient seen and examined today. Patient with a history of kidney stones, patellar fracture, hypothyroid hypertension, ankle fracture, s/p R patella revision ORIF. Patient reports R lower extremity, "stabbing, throbbing shooting and burning" pain worsened with activity. Patient reports poor pain relief with Oxycodone PO and reports Dilaudid IVP wears off too soon. Patient denies any allergies to Dilaudid PO. Patient Axox3, denies n,v no s/s of oversedation. Reviewed pain medication regimen with patient at bedside. R leg toes warm to touch, sensation intact.            Pain is __x_ sharp ____dull ___burning _x__achy ___ Intensity: ____ mild _x__mod ___severe     Location _x___surgical site ____cervical _____lumbar ____abd ____upper ext__x__lower ext    Worse with _x___activity __x__movement _____physical therapy___ Rest    Improved with _x___medication __x__rest ____physical therapy      ROS: Const:  _-__febrile   Eyes:___ENT:___CV: _-__chest pain  Resp: __-__sob  GI:_x__nausea _-__vomiting ___abd pain ___npo ___clears _x_full diet __bm  :___ Musk: x___pain _-__spasm  Skin:___ Neuro:  -___iyibskbs__-_maeaaazhw_-__ numbness _-__weakness _x__paresth  Psych:-__anxiety  Endo:___ Heme:___Allergy:_________, __x_all others reviewed and negative      PAST MEDICAL & SURGICAL HISTORY:  Kidney stones  Hypothyroid  Hypertension  Ankle fracture, right: 5 yrs ago tx @[facility] with plates and rodes  S/P tonsillectomy      SH: _-__Tobacco   ___Alcohol                          FH:FAMILY HISTORY:  Family history of acute myocardial infarction (Father): Father  at the age of 69      acetaminophen   Tablet .. 975 milliGRAM(s) Oral every 8 hours  aluminum hydroxide/magnesium hydroxide/simethicone Suspension 20 milliLiter(s) Oral four times a day PRN  amLODIPine   Tablet 10 milliGRAM(s) Oral daily  ascorbic acid 500 milliGRAM(s) Oral daily  aspirin enteric coated 325 milliGRAM(s) Oral two times a day  celecoxib 200 milliGRAM(s) Oral two times a day  cholecalciferol 1000 Unit(s) Oral every other day  cyclobenzaprine 5 milliGRAM(s) Oral three times a day PRN  gabapentin 300 milliGRAM(s) Oral three times a day  guaiFENesin   Syrup  (Sugar-Free) 200 milliGRAM(s) Oral every 6 hours PRN  HYDROmorphone   Tablet 4 milliGRAM(s) Oral every 4 hours PRN  HYDROmorphone   Tablet 6 milliGRAM(s) Oral every 4 hours PRN  HYDROmorphone  Injectable 1 milliGRAM(s) IV Push every 3 hours PRN  lactated ringers. 1000 milliLiter(s) IV Continuous <Continuous>  levothyroxine 88 MICROGram(s) Oral daily  LORazepam   Injectable 2 milliGRAM(s) IV Push every 1 hour PRN  magnesium hydroxide Suspension 30 milliLiter(s) Oral daily PRN  ondansetron Injectable 4 milliGRAM(s) IV Push every 6 hours PRN  pantoprazole    Tablet 40 milliGRAM(s) Oral before breakfast  polyethylene glycol 3350 17 Gram(s) Oral daily  senna 2 Tablet(s) Oral at bedtime PRN  zolpidem 5 milliGRAM(s) Oral at bedtime PRN      T(C): 37.2 (20 @ 08:51), Max: 37.3 (20 @ 05:00)  HR: 81 (20 @ 08:51) (66 - 94)  BP: 160/75 (20 @ 08:51) (105/57 - 160/75)  RR: 18 (20 @ 08:51) (12 - 22)  SpO2: 97% (20 @ 08:51) (95% - 100%)  Wt(kg): --    T(C): 37.2 (20 @ 08:51), Max: 37.3 (20 @ 05:00)  HR: 81 (20 @ 08:51) (66 - 94)  BP: 160/75 (20 @ 08:51) (105/57 - 160/75)  RR: 18 (20 @ 08:51) (12 - )  SpO2: 97% (20 @ 08:51) (95% - 100%)  Wt(kg): --    T(C): 37.2 (20 @ 08:51), Max: 37.3 (20 @ 05:00)  HR: 81 (20 @ 08:51) (66 - 94)  BP: 160/75 (20 @ 08:51) (105/57 - 160/75)  RR: 18 (20 @ 08:51) (12 - )  SpO2: 97% (20 @ 08:51) (95% - 100%)  Wt(kg): --      PHYSICAL EXAM:  Gen Appearance: __x_no acute distress __x_appropriate        Neuro: _x__SILT feet____ EOM Intact Psych: AAOX_3_, _x__mood/affect appropriate        Eyes: _x__conjunctiva WNL  ___x__ Pupils equal and round        ENT: _x__ears and nose atraumatic__x_ Hearing grossly intact        Neck: _x__trachea midline, no visible masses ___thyroid without palpable mass    Resp: _x__Nml WOB____No tactile fremitus ___clear to auscultation    Cardio: ___extremities free from edema _x___pedal pulses palpable    GI/Abdomen: _x__soft ___x__ Nontender__x____Nondistended_____HSM    Lymphatic: ___no palpable nodes in neck  ___no palpable nodes calves and feet    Skin/Wound: ___Incision, _x__Dressing c/d/i,   ____surrounding tissues soft,  ___drain/chest tube present____    Muscular: EHL _4__/5  Gastrocnemius_4__/5    ___absent clubbing/cyanosis        ASSESSMENT: This is a 64y old Male with a history of kidney stones, patellar fracture, hypothroid, hypertension, ankle fracture, s/p R patella revision ORIF.       Recommended Treatment PLAN:  1. Dilaudid 4-6mg PO Q4h prn moderate to severe pain  2. Dilaudid 1mg Q3h IVP prn breakthrough pain  3. Tylenol 975mg PO Q8h standing  4. Celebrex 200mg PO BID  5. Flexeril 5mg Po Q8h prn muscle spasms  6. Gabapentin 300mg Po TID  Plan discussed with Dr. Sung

## 2020-01-22 NOTE — PROGRESS NOTE ADULT - SUBJECTIVE AND OBJECTIVE BOX
S: No events overnight    O:   Vital Signs Last 24 Hrs  T(C): 37.3 (22 Jan 2020 05:00), Max: 37.3 (22 Jan 2020 05:00)  T(F): 99.1 (22 Jan 2020 05:00), Max: 99.1 (22 Jan 2020 05:00)  HR: 89 (22 Jan 2020 05:00) (66 - 94)  BP: 149/78 (22 Jan 2020 05:00) (105/57 - 149/78)  BP(mean): 100 (21 Jan 2020 21:30) (75 - 104)  RR: 19 (22 Jan 2020 05:00) (12 - 22)  SpO2: 96% (22 Jan 2020 05:00) (95% - 100%)    01-21 @ 07:01  -  01-22 @ 07:00  --------------------------------------------------------  IN:    lactated ringers.: 240 mL    Oral Fluid: 750 mL  Total IN: 990 mL    OUT:    Voided: 950 mL  Total OUT: 950 mL    Total NET: 40 mL    PE:  RLE  Cast CDI  Sensation to light touch intact S/S/DP/SP/Tib, Strength EHL/FHL/TA/GS 5/5, DP 2+, Ext WWP                          13.0   6.97  )-----------( 150      ( 21 Jan 2020 07:42 )             37.6     01-21    138  |  106  |  10  ----------------------------<  106<H>  3.8   |  23  |  0.69    Ca    8.6      21 Jan 2020 07:42

## 2020-01-22 NOTE — PROGRESS NOTE ADULT - ASSESSMENT
64 year old M presenting for ORIF revision, medicine consulted s/p patella fx revision.     1) Post-op state: c/w pain control, recommend pain management consult, c/w bowel regimen, c/w IS  2) HTN: c/w norvasc 10 mg  3) Hypothyroidism: c/w synthroid 88 mcg  4) Viral URI: robitussin, lozenges,

## 2020-01-23 LAB
ANION GAP SERPL CALC-SCNC: 11 MMOL/L — SIGNIFICANT CHANGE UP (ref 5–17)
BUN SERPL-MCNC: 16 MG/DL — SIGNIFICANT CHANGE UP (ref 7–23)
CALCIUM SERPL-MCNC: 8.4 MG/DL — SIGNIFICANT CHANGE UP (ref 8.4–10.5)
CHLORIDE SERPL-SCNC: 103 MMOL/L — SIGNIFICANT CHANGE UP (ref 96–108)
CO2 SERPL-SCNC: 25 MMOL/L — SIGNIFICANT CHANGE UP (ref 22–31)
CREAT SERPL-MCNC: 0.7 MG/DL — SIGNIFICANT CHANGE UP (ref 0.5–1.3)
GLUCOSE SERPL-MCNC: 123 MG/DL — HIGH (ref 70–99)
HCT VFR BLD CALC: 36.3 % — LOW (ref 39–50)
HGB BLD-MCNC: 12.5 G/DL — LOW (ref 13–17)
MCHC RBC-ENTMCNC: 32.9 PG — SIGNIFICANT CHANGE UP (ref 27–34)
MCHC RBC-ENTMCNC: 34.4 GM/DL — SIGNIFICANT CHANGE UP (ref 32–36)
MCV RBC AUTO: 95.5 FL — SIGNIFICANT CHANGE UP (ref 80–100)
NRBC # BLD: 0 /100 WBCS — SIGNIFICANT CHANGE UP (ref 0–0)
PLATELET # BLD AUTO: 148 K/UL — LOW (ref 150–400)
POTASSIUM SERPL-MCNC: 3.8 MMOL/L — SIGNIFICANT CHANGE UP (ref 3.5–5.3)
POTASSIUM SERPL-SCNC: 3.8 MMOL/L — SIGNIFICANT CHANGE UP (ref 3.5–5.3)
RBC # BLD: 3.8 M/UL — LOW (ref 4.2–5.8)
RBC # FLD: 12.6 % — SIGNIFICANT CHANGE UP (ref 10.3–14.5)
SODIUM SERPL-SCNC: 139 MMOL/L — SIGNIFICANT CHANGE UP (ref 135–145)
WBC # BLD: 7.98 K/UL — SIGNIFICANT CHANGE UP (ref 3.8–10.5)
WBC # FLD AUTO: 7.98 K/UL — SIGNIFICANT CHANGE UP (ref 3.8–10.5)

## 2020-01-23 PROCEDURE — 99232 SBSQ HOSP IP/OBS MODERATE 35: CPT

## 2020-01-23 RX ORDER — CALCIUM CARBONATE 500(1250)
1 TABLET ORAL DAILY
Refills: 0 | Status: DISCONTINUED | OUTPATIENT
Start: 2020-01-23 | End: 2020-01-24

## 2020-01-23 RX ORDER — POLYETHYLENE GLYCOL 3350 17 G/17G
17 POWDER, FOR SOLUTION ORAL
Qty: 0 | Refills: 0 | DISCHARGE
Start: 2020-01-23

## 2020-01-23 RX ADMIN — GABAPENTIN 300 MILLIGRAM(S): 400 CAPSULE ORAL at 13:23

## 2020-01-23 RX ADMIN — Medication 975 MILLIGRAM(S): at 22:51

## 2020-01-23 RX ADMIN — Medication 975 MILLIGRAM(S): at 23:28

## 2020-01-23 RX ADMIN — HYDROMORPHONE HYDROCHLORIDE 4 MILLIGRAM(S): 2 INJECTION INTRAMUSCULAR; INTRAVENOUS; SUBCUTANEOUS at 05:44

## 2020-01-23 RX ADMIN — HYDROMORPHONE HYDROCHLORIDE 4 MILLIGRAM(S): 2 INJECTION INTRAMUSCULAR; INTRAVENOUS; SUBCUTANEOUS at 11:59

## 2020-01-23 RX ADMIN — CELECOXIB 200 MILLIGRAM(S): 200 CAPSULE ORAL at 05:43

## 2020-01-23 RX ADMIN — HYDROMORPHONE HYDROCHLORIDE 4 MILLIGRAM(S): 2 INJECTION INTRAMUSCULAR; INTRAVENOUS; SUBCUTANEOUS at 06:44

## 2020-01-23 RX ADMIN — PANTOPRAZOLE SODIUM 40 MILLIGRAM(S): 20 TABLET, DELAYED RELEASE ORAL at 05:44

## 2020-01-23 RX ADMIN — Medication 500 MILLIGRAM(S): at 13:23

## 2020-01-23 RX ADMIN — AMLODIPINE BESYLATE 10 MILLIGRAM(S): 2.5 TABLET ORAL at 05:44

## 2020-01-23 RX ADMIN — ZOLPIDEM TARTRATE 5 MILLIGRAM(S): 10 TABLET ORAL at 22:53

## 2020-01-23 RX ADMIN — Medication 1 TABLET(S): at 18:37

## 2020-01-23 RX ADMIN — GABAPENTIN 300 MILLIGRAM(S): 400 CAPSULE ORAL at 05:43

## 2020-01-23 RX ADMIN — GABAPENTIN 300 MILLIGRAM(S): 400 CAPSULE ORAL at 21:11

## 2020-01-23 RX ADMIN — HYDROMORPHONE HYDROCHLORIDE 4 MILLIGRAM(S): 2 INJECTION INTRAMUSCULAR; INTRAVENOUS; SUBCUTANEOUS at 10:59

## 2020-01-23 RX ADMIN — CELECOXIB 200 MILLIGRAM(S): 200 CAPSULE ORAL at 18:37

## 2020-01-23 RX ADMIN — Medication 88 MICROGRAM(S): at 05:44

## 2020-01-23 RX ADMIN — CELECOXIB 200 MILLIGRAM(S): 200 CAPSULE ORAL at 19:37

## 2020-01-23 RX ADMIN — Medication 325 MILLIGRAM(S): at 18:37

## 2020-01-23 RX ADMIN — Medication 325 MILLIGRAM(S): at 05:44

## 2020-01-23 RX ADMIN — CELECOXIB 200 MILLIGRAM(S): 200 CAPSULE ORAL at 06:30

## 2020-01-23 RX ADMIN — POLYETHYLENE GLYCOL 3350 17 GRAM(S): 17 POWDER, FOR SOLUTION ORAL at 13:22

## 2020-01-23 NOTE — PHYSICAL THERAPY INITIAL EVALUATION ADULT - ADDITIONAL COMMENTS
Pt lives in an apartment building with an elevator, + 2 small steps to enter. Pt recently had patella ORIF and was able to negotiate steps at home with rolling walker.  Pt has rolling walker and single point cane at home.

## 2020-01-23 NOTE — PROGRESS NOTE ADULT - SUBJECTIVE AND OBJECTIVE BOX
Pain Management [facility] - [facility] [phone]      HPI: Patient seen and examined today. Patient with a history of kidney stones, patellar fracture, hypothyroid hypertension, ankle fracture, s/p R patella revision ORIF POD#2. Patient reports R lower extremity, "stabbing, throbbing shooting and burning" pain worsened with activity, pain controlled with current pain medication regimen. Patient denies n,v no s/s of oversedation. R leg in a long cast, foot warm to touch, patient reports sensation to toes.       Pain is __x_ sharp ____dull ___burning _x__achy ___ Intensity: ____ mild _x__mod ___severe     Location _x___surgical site ____cervical _____lumbar ____abd ____upper ext__x__lower ext    Worse with _x___activity __x__movement _____physical therapy___ Rest    Improved with _x___medication __x__rest ____physical therapy      ibuprofen  Tablet.  acetaminophen   Tablet ..  oxycodone    5 mG/acetaminophen 325 mG  zolpidem  amLODIPine   Tablet  pantoprazole    Tablet  levothyroxine  lactated ringers.  acetaminophen   Tablet ..  oxyCODONE    IR  oxyCODONE    IR  HYDROmorphone  Injectable  aluminum hydroxide/magnesium hydroxide/simethicone Suspension  ondansetron Injectable  magnesium hydroxide Suspension  polyethylene glycol 3350  bisacodyl Suppository  senna  guaiFENesin   Syrup  (Sugar-Free)  ceFAZolin   IVPB  aspirin enteric coated  ceFAZolin  Injectable.  morphine  - Injectable  LORazepam   Injectable  ascorbic acid  cholecalciferol  acetaminophen   Tablet ..  HYDROmorphone  Injectable  HYDROmorphone  Injectable  amLODIPine   Tablet  levothyroxine  celecoxib  HYDROmorphone   Tablet  HYDROmorphone   Tablet  cyclobenzaprine  HYDROmorphone  Injectable  gabapentin  HYDROmorphone  Injectable      ROS: Const:  _-__febrile   Eyes:___ENT:___CV: _-__chest pain  Resp: __-__sob  GI:_x__nausea _-__vomiting ___abd pain ___npo ___clears _x_full diet __bm  :___ Musk: x___pain _-__spasm  Skin:___ Neuro:  -___zosycsly__-_ebagkhivn_-__ numbness _-__weakness _x__paresth  Psych:-__anxiety  Endo:___ Heme:___Allergy:_________, __x_all others reviewed and negative      PAST MEDICAL & SURGICAL HISTORY:  Kidney stones  Hypothyroid  Hypertension  Ankle fracture, right: 5 yrs ago tx @[facility] with plates and rodes  S/P tonsillectomy      01-23 @ 07:09956 mL/min/1.73M2      Hemoglobin: 12.5 g/dL (01-23 @ 07:04)  Hemoglobin: 12.8 g/dL (01-22 @ 07:44)      T(C): 36.8 (01-23-20 @ 08:12), Max: 37.1 (01-22-20 @ 15:56)  HR: 84 (01-23-20 @ 08:12) (84 - 87)  BP: 144/90 (01-23-20 @ 08:12) (133/75 - 161/84)  RR: 16 (01-23-20 @ 08:12) (16 - 18)  SpO2: 97% (01-23-20 @ 08:12) (96% - 97%)  Wt(kg): --       PHYSICAL EXAM:  Gen Appearance: __x_no acute distress __x_appropriate        Neuro: _x__SILT feet____ EOM Intact Psych: AAOX_3_, _x__mood/affect appropriate        Eyes: _x__conjunctiva WNL  ___x__ Pupils equal and round        ENT: _x__ears and nose atraumatic__x_ Hearing grossly intact        Neck: _x__trachea midline, no visible masses ___thyroid without palpable mass    Resp: _x__Nml WOB____No tactile fremitus ___clear to auscultation    Cardio: ___extremities free from edema _x___pedal pulses palpable    GI/Abdomen: _x__soft ___x__ Nontender__x____Nondistended_____HSM    Lymphatic: ___no palpable nodes in neck  ___no palpable nodes calves and feet    Skin/Wound: ___Incision, _x__Dressing c/d/i,   ____surrounding tissues soft,  ___drain/chest tube present____    Muscular: EHL _4__/5  Gastrocnemius_4__/5    ___absent clubbing/cyanosis        ASSESSMENT: This is a 64y old Male with a history of kidney stones, patellar fracture, hypothyroid hypertension, ankle fracture, s/p R patella revision ORIF, pod#2, pain controlled with current pain medication regimen.       Recommended Treatment PLAN:  1. Dilaudid 4-6mg PO Q4h prn moderate to severe pain  2. Dilaudid 1mg Q3h IVP prn breakthrough pain  3. Tylenol 975mg PO Q8h standing  4. Celebrex 200mg PO BID  5. Flexeril 5mg Po Q8h prn muscle spasms  6. Gabapentin 300mg Po TID  Plan discussed with Dr. Sung at bedside

## 2020-01-23 NOTE — PHYSICAL THERAPY INITIAL EVALUATION ADULT - CRITERIA FOR SKILLED THERAPEUTIC INTERVENTIONS
functional limitations in following categories/risk reduction/prevention/predicted duration of therapy intervention/rehab potential/anticipated discharge recommendation/therapy frequency/anticipated equipment needs at discharge/impairments found

## 2020-01-23 NOTE — PROGRESS NOTE ADULT - SUBJECTIVE AND OBJECTIVE BOX
Ortho Note    Surgery: POD #2 s/p revision right patella ORIF with application of cylinder cast   Patient seen and examined at bedside this AM  Notes pain significantly improved from yesterday   states left hand pain stable- felt he "jammed it" when he injured leg   Denies CP, SOB, N/V, numbness/tingling     Vital Signs Last 24 Hrs  T(C): 36.8 (01-23-20 @ 08:12), Max: 36.8 (01-23-20 @ 08:12)  T(F): 98.3 (01-23-20 @ 08:12), Max: 98.3 (01-23-20 @ 08:12)  HR: 84 (01-23-20 @ 08:12) (84 - 84)  BP: 144/90 (01-23-20 @ 08:12) (133/75 - 144/90)  BP(mean): --  RR: 16 (01-23-20 @ 08:12) (16 - 17)  SpO2: 97% (01-23-20 @ 08:12) (97% - 97%)  AVSS    General: Pt Alert and oriented, NAD  Left hand: normal to inspection, + mild TTP dorsum, AROM digits, wrist, flexion/ext against resistance intact  cast clean/dry/intact RLE   Pulses: @+ DP pulse RLE, extremity warm and well perfused   Sensation: intact right foot   Motor: EHL/FHL/TA/GS 5/5 RLE                           12.5   7.98  )-----------( 148      ( 23 Jan 2020 07:04 )             36.3   23 Jan 2020 07:04    139    |  103    |  16     ----------------------------<  123    3.8     |  25     |  0.70     Ca    8.4        23 Jan 2020 07:04        A/P: 64yMale POD#2 s/p revision Right patella ORIF     - Stable, VSS, labs stable   - Pain Control: pain management recs appreciated   - DVT ppx: ASA 325mg BID   - left hand XR: + soft tissue swelling, negative for fx   - PT, WBS: WBAT RLE in cast   - dispo: home pending PT clearance, likely tomorrow     Ortho Pager [phone]

## 2020-01-23 NOTE — PHYSICAL THERAPY INITIAL EVALUATION ADULT - GENERAL OBSERVATIONS, REHAB EVAL
Pt received supine with HOB elevated in NAD all lines in tact, agreeable to PT Evaluation, cleared for PT by ISAIAS Estrada

## 2020-01-23 NOTE — PHYSICAL THERAPY INITIAL EVALUATION ADULT - PLANNED THERAPY INTERVENTIONS, PT EVAL
gait training/balance training/neuromuscular re-education/postural re-education/transfer training/stretching/bed mobility training/manual therapy techniques/strengthening

## 2020-01-23 NOTE — PROGRESS NOTE ADULT - SUBJECTIVE AND OBJECTIVE BOX
OVERNIGHT EVENTS:    SUBJECTIVE / INTERVAL HPI: Patient seen and examined at bedside. Pain controlled.     VITAL SIGNS:  Vital Signs Last 24 Hrs  T(C): 36.8 (23 Jan 2020 08:12), Max: 37.1 (22 Jan 2020 15:56)  T(F): 98.3 (23 Jan 2020 08:12), Max: 98.7 (22 Jan 2020 15:56)  HR: 84 (23 Jan 2020 08:12) (84 - 87)  BP: 135/83 (23 Jan 2020 12:46) (133/75 - 161/84)  BP(mean): --  RR: 16 (23 Jan 2020 08:12) (16 - 18)  SpO2: 97% (23 Jan 2020 08:12) (96% - 97%)    Physical Exam:   GEN: NAD, AAOx3, malodorous  HEENT: MMM,   CV: S1 S2 RRR,  Lung: CTAB  Abd: soft NT ND   Ext: RLE in cast from thigh to foot    MEDICATIONS:  MEDICATIONS  (STANDING):  acetaminophen   Tablet .. 975 milliGRAM(s) Oral every 8 hours  amLODIPine   Tablet 10 milliGRAM(s) Oral daily  ascorbic acid 500 milliGRAM(s) Oral daily  aspirin enteric coated 325 milliGRAM(s) Oral two times a day  celecoxib 200 milliGRAM(s) Oral two times a day  cholecalciferol 1000 Unit(s) Oral every other day  gabapentin 300 milliGRAM(s) Oral three times a day  lactated ringers. 1000 milliLiter(s) (120 mL/Hr) IV Continuous <Continuous>  levothyroxine 88 MICROGram(s) Oral daily  pantoprazole    Tablet 40 milliGRAM(s) Oral before breakfast  polyethylene glycol 3350 17 Gram(s) Oral daily    MEDICATIONS  (PRN):  aluminum hydroxide/magnesium hydroxide/simethicone Suspension 20 milliLiter(s) Oral four times a day PRN Indigestion  bisacodyl Suppository 10 milliGRAM(s) Rectal daily PRN If no bowel movement by postoperative day #2  cyclobenzaprine 5 milliGRAM(s) Oral three times a day PRN Muscle Spasm  guaiFENesin   Syrup  (Sugar-Free) 200 milliGRAM(s) Oral every 6 hours PRN cold  HYDROmorphone   Tablet 4 milliGRAM(s) Oral every 4 hours PRN Moderate Pain (4 - 6)  HYDROmorphone   Tablet 6 milliGRAM(s) Oral every 4 hours PRN Severe Pain (7 - 10)  HYDROmorphone  Injectable 1 milliGRAM(s) IV Push every 3 hours PRN breakthrough pain  LORazepam   Injectable 2 milliGRAM(s) IV Push every 1 hour PRN CIWA-Ar score 8 or greater  magnesium hydroxide Suspension 30 milliLiter(s) Oral daily PRN Constipation--2nd Line  ondansetron Injectable 4 milliGRAM(s) IV Push every 6 hours PRN Nausea and/or Vomiting  senna 2 Tablet(s) Oral at bedtime PRN Constipation--1st Line  zolpidem 5 milliGRAM(s) Oral at bedtime PRN Insomnia      ALLERGIES:  Allergies    No Known Allergies    Intolerances        LABS:                        12.5   7.98  )-----------( 148      ( 23 Jan 2020 07:04 )             36.3     01-23    139  |  103  |  16  ----------------------------<  123<H>  3.8   |  25  |  0.70    Ca    8.4      23 Jan 2020 07:04          CAPILLARY BLOOD GLUCOSE          RADIOLOGY & ADDITIONAL TESTS: Reviewed.    ASSESSMENT:    PLAN:

## 2020-01-23 NOTE — PROGRESS NOTE ADULT - ASSESSMENT
64 year old M presenting for ORIF revision, medicine consulted s/p patella fx revision.     1) Post-op state: c/w pain control, c/w bowel regimen, c/w IS  2) HTN: c/w norvasc 10 mg  3) Hypothyroidism: c/w synthroid 88 mcg

## 2020-01-23 NOTE — PROGRESS NOTE ADULT - SUBJECTIVE AND OBJECTIVE BOX
S: No events overnight    O:   Vital Signs Last 24 Hrs  T(C): 36.7 (23 Jan 2020 05:47), Max: 37.2 (22 Jan 2020 08:51)  T(F): 98 (23 Jan 2020 05:47), Max: 99 (22 Jan 2020 08:51)  HR: 84 (23 Jan 2020 05:47) (81 - 87)  BP: 133/75 (23 Jan 2020 05:47) (133/75 - 161/84)  BP(mean): --  ABP: --  ABP(mean): --  RR: 17 (23 Jan 2020 05:47) (17 - 18)  SpO2: 97% (23 Jan 2020 05:47) (96% - 97%)      PE:  RLE  Cast CDI  Sensation to light touch intact S/S/DP/SP/Tib, Strength EHL/FHL/TA/GS 5/5, DP 2+, Ext WWP                          12.5   7.98  )-----------( 148      ( 23 Jan 2020 07:04 )             36.3   01-23    139  |  103  |  16  ----------------------------<  123<H>  3.8   |  25  |  0.70    Ca    8.4      23 Jan 2020 07:04

## 2020-01-23 NOTE — PROGRESS NOTE ADULT - ASSESSMENT
64y m s/p R patella revision ORIF 1/21/2020    -PT/WBAT in long leg cast  -Pain control/PM  -DVT PPX: ASA  -Dispo Planning: TBD

## 2020-01-24 ENCOUNTER — TRANSCRIPTION ENCOUNTER (OUTPATIENT)
Age: 65
End: 2020-01-24

## 2020-01-24 VITALS
DIASTOLIC BLOOD PRESSURE: 81 MMHG | HEART RATE: 83 BPM | TEMPERATURE: 98 F | OXYGEN SATURATION: 95 % | SYSTOLIC BLOOD PRESSURE: 135 MMHG | RESPIRATION RATE: 17 BRPM

## 2020-01-24 PROCEDURE — 99232 SBSQ HOSP IP/OBS MODERATE 35: CPT

## 2020-01-24 RX ORDER — GABAPENTIN 400 MG/1
1 CAPSULE ORAL
Qty: 42 | Refills: 0
Start: 2020-01-24 | End: 2020-02-06

## 2020-01-24 RX ORDER — HYDROMORPHONE HYDROCHLORIDE 2 MG/ML
1 INJECTION INTRAMUSCULAR; INTRAVENOUS; SUBCUTANEOUS
Qty: 30 | Refills: 0
Start: 2020-01-24 | End: 2020-01-28

## 2020-01-24 RX ORDER — CELECOXIB 200 MG/1
1 CAPSULE ORAL
Qty: 28 | Refills: 0
Start: 2020-01-24 | End: 2020-02-06

## 2020-01-24 RX ORDER — ASPIRIN/CALCIUM CARB/MAGNESIUM 324 MG
1 TABLET ORAL
Qty: 60 | Refills: 0
Start: 2020-01-24 | End: 2020-02-22

## 2020-01-24 RX ADMIN — GABAPENTIN 300 MILLIGRAM(S): 400 CAPSULE ORAL at 05:22

## 2020-01-24 RX ADMIN — POLYETHYLENE GLYCOL 3350 17 GRAM(S): 17 POWDER, FOR SOLUTION ORAL at 09:00

## 2020-01-24 RX ADMIN — Medication 88 MICROGRAM(S): at 05:22

## 2020-01-24 RX ADMIN — Medication 1000 UNIT(S): at 14:09

## 2020-01-24 RX ADMIN — Medication 500 MILLIGRAM(S): at 14:09

## 2020-01-24 RX ADMIN — Medication 325 MILLIGRAM(S): at 05:22

## 2020-01-24 RX ADMIN — CELECOXIB 200 MILLIGRAM(S): 200 CAPSULE ORAL at 17:34

## 2020-01-24 RX ADMIN — CELECOXIB 200 MILLIGRAM(S): 200 CAPSULE ORAL at 06:37

## 2020-01-24 RX ADMIN — CELECOXIB 200 MILLIGRAM(S): 200 CAPSULE ORAL at 05:22

## 2020-01-24 RX ADMIN — HYDROMORPHONE HYDROCHLORIDE 4 MILLIGRAM(S): 2 INJECTION INTRAMUSCULAR; INTRAVENOUS; SUBCUTANEOUS at 17:34

## 2020-01-24 RX ADMIN — PANTOPRAZOLE SODIUM 40 MILLIGRAM(S): 20 TABLET, DELAYED RELEASE ORAL at 05:22

## 2020-01-24 RX ADMIN — Medication 1 TABLET(S): at 14:09

## 2020-01-24 RX ADMIN — HYDROMORPHONE HYDROCHLORIDE 4 MILLIGRAM(S): 2 INJECTION INTRAMUSCULAR; INTRAVENOUS; SUBCUTANEOUS at 17:13

## 2020-01-24 RX ADMIN — CELECOXIB 200 MILLIGRAM(S): 200 CAPSULE ORAL at 17:13

## 2020-01-24 RX ADMIN — Medication 325 MILLIGRAM(S): at 17:13

## 2020-01-24 RX ADMIN — AMLODIPINE BESYLATE 10 MILLIGRAM(S): 2.5 TABLET ORAL at 05:22

## 2020-01-24 RX ADMIN — Medication 200 MILLIGRAM(S): at 14:09

## 2020-01-24 RX ADMIN — GABAPENTIN 300 MILLIGRAM(S): 400 CAPSULE ORAL at 14:09

## 2020-01-24 NOTE — PROGRESS NOTE ADULT - SUBJECTIVE AND OBJECTIVE BOX
Pain Management Progress Note - [facility] [phone]    HPI: Patient seen during morning rounds, in NAD. States is improving and he is eager to wean off of Dilaudid.      Pertinent PMH: Pain at: ___Back ___Neck_x__Knee ___Hip ___Shoulder ___ Opioid tolerance    Pain is _x__ sharp ____dull ___burning __x_achy ___ Intensity: ____ mild _x___mod ____severe     Location ___x__surgical site _____cervical _____lumbar ____abd _____upper ext___x_lower ext    Worse with ___x_activity __x__movement _____physical therapy___ Rest    Improved with __x__medication __x__rest ____physical therapy    ibuprofen  Tablet.  acetaminophen   Tablet ..  oxycodone    5 mG/acetaminophen 325 mG  zolpidem  amLODIPine   Tablet  pantoprazole    Tablet  levothyroxine  lactated ringers.  acetaminophen   Tablet ..  oxyCODONE    IR  HYDROmorphone  Injectable  aluminum hydroxide/magnesium hydroxide/simethicone Suspension  ondansetron Injectable  magnesium hydroxide Suspension  polyethylene glycol 3350  bisacodyl Suppository  senna  guaiFENesin   Syrup  (Sugar-Free)  ceFAZolin   IVPB  aspirin enteric coated  ceFAZolin  Injectable.  ascorbic acid  cholecalciferol  acetaminophen   Tablet ..  HYDROmorphone  Injectable  gabapentin  calcium carbonate   1250 mG (OsCal)      ROS: Const:  __-_febrile   Eyes:___ENT:___CV: _-__chest pain  Resp: _-___sob  GI:___nausea ___vomiting __-__abd pain ___npo ___clears ___full diet __bm  :___ Musk: _x__pain ___spasm  Skin:___ Neuro:  ___sedation___confusion____ numbness ___weakness ___paresthesia  Psych:___anxiety  Endo:___ Heme:___Allergy:___  __x__ all other systems reviewed and negative     Hemoglobin: 12.5 g/dL (01-23 @ 07:04)      T(C): 36.6 (01-24-20 @ 06:12), Max: 36.8 (01-23-20 @ 16:21)  HR: 81 (01-24-20 @ 06:12) (80 - 88)  BP: 134/83 (01-24-20 @ 06:12) (134/83 - 149/85)  RR: 16 (01-24-20 @ 06:12) (16 - 16)  SpO2: 95% (01-24-20 @ 06:12) (95% - 97%)  Wt(kg): --     PHYSICAL EXAM:  Gen Appearance: _x__no acute distress _x__appropriate       Neuro: __x_SILT feet____ EOM Intact Psych: AAOX_3_, __x_mood/affect appropriate        Eyes: _x__conjunctiva WNL  _____ Pupils equal and round        ENT: _x__ears and nose atraumatic__x_ Hearing grossly intact        Neck: _x__trachea midline, no visible masses ___thyroid without palpable mass    Resp: _x__Nml WOB____No tactile fremitus ___clear to auscultation    Cardio: _x__extremities free from edema _x___pedal pulses palpable    GI/Abdomen: __x_soft ___x__ Nontender____x__Nondistended_____HSM    Lymphatic: ___no palpable nodes in neck  ___no palpable nodes calves and feet    Skin/Wound: ___Incision, __x_Dressing c/d/i,   ____surrounding tissues soft,  ___drain/chest tube present____    Muscular: EHL _5__/5  Gastrocnemius_5__/5    __x_absent clubbing/cyanosis         ASSESSMENT:  This is a 64y old Male with a history of:  PATELLAR FRACTURE  Family history of acute myocardial infarction (Father)  Handoff  MEWS Score  Kidney stones  Hypothyroid  Hypertension  Kidney stones  Closed 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 Dilaudid 2-4mg PO q4 PRN moderate to severe pain  2. Continue remainder of current regimen  Plan discussed with Dr. Sung    Patient seen, evaluated and examined by attending physician, Dr. Sung.  PA active as scribbeto.

## 2020-01-24 NOTE — PROGRESS NOTE ADULT - ASSESSMENT
64y m s/p R patella revision ORIF 1/21/2020    -PT/WBAT in long leg cast  -Pain control/PM  -DVT PPX: ASA  -Dispo Planning: Home

## 2020-01-24 NOTE — PROGRESS NOTE ADULT - SUBJECTIVE AND OBJECTIVE BOX
Ortho Note    Surgery: POD #3 s/p revision right patella ORIF with application of cylinder cast     Patient seen and examined at bedside this AM  Pain still controlled   Denies CP, SOB, N/V, numbness/tingling       Vital Signs Last 24 Hrs  T(C): 36.6 (01-24-20 @ 06:12), Max: 36.6 (01-24-20 @ 06:12)  T(F): 97.8 (01-24-20 @ 06:12), Max: 97.8 (01-24-20 @ 06:12)  HR: 81 (01-24-20 @ 06:12) (81 - 81)  BP: 134/83 (01-24-20 @ 06:12) (134/83 - 134/83)  BP(mean): --  RR: 16 (01-24-20 @ 06:12) (16 - 16)  SpO2: 95% (01-24-20 @ 06:12) (95% - 95%)  AVSS    General: Pt Alert and oriented, NAD  cast clean/dry/intact RLE   Pulses: 2+ DP pulse RLE, extremity warm and well perfused   Sensation: intact right foot   Motor: EHL/FHL/TA/GS 5/5 RLE                           12.5   7.98  )-----------( 148      ( 23 Jan 2020 07:04 )             36.3   23 Jan 2020 07:04    139    |  103    |  16     ----------------------------<  123    3.8     |  25     |  0.70         A/P: 64yMale POD#3 s/p revision Right patella ORIF     - Stable, VSS, labs stable   - Pain Control: pain management recs appreciated   - DVT ppx: ASA 325mg BID   - PT, WBS: WBAT RLE in cast   - dispo: home with HPT today       Ortho Pager [phone]

## 2020-01-24 NOTE — PROGRESS NOTE ADULT - REASON FOR ADMISSION
R patella fracture

## 2020-01-24 NOTE — PROGRESS NOTE ADULT - SUBJECTIVE AND OBJECTIVE BOX
S: No events overnight    O:   Vital Signs Last 24 Hrs  T(C): 36.6 (24 Jan 2020 06:12), Max: 36.8 (23 Jan 2020 16:21)  T(F): 97.8 (24 Jan 2020 06:12), Max: 98.3 (23 Jan 2020 16:21)  HR: 81 (24 Jan 2020 06:12) (80 - 88)  BP: 134/83 (24 Jan 2020 06:12) (134/83 - 149/85)  BP(mean): --  ABP: --  ABP(mean): --  RR: 16 (24 Jan 2020 06:12) (16 - 16)  SpO2: 95% (24 Jan 2020 06:12) (95% - 97%)    PE:  RLE  Cast CDI  Sensation to light touch intact S/S/DP/SP/Tib, Strength EHL/FHL/TA/GS 5/5, DP 2+, Ext WWP                          12.5   7.98  )-----------( 148      ( 23 Jan 2020 07:04 )             36.3   01-23    139  |  103  |  16  ----------------------------<  123<H>  3.8   |  25  |  0.70    Ca    8.4      23 Jan 2020 07:04

## 2020-01-24 NOTE — DISCHARGE NOTE NURSING/CASE MANAGEMENT/SOCIAL WORK - [facility]GEN_ALL_CORE
Ad per endorsement from  [facility] : [facility] Intake (they only have home PT and no RN service)

## 2020-01-24 NOTE — PROGRESS NOTE ADULT - SUBJECTIVE AND OBJECTIVE BOX
OVERNIGHT EVENTS:    SUBJECTIVE / INTERVAL HPI: Patient seen and examined at bedside. No complaints.     VITAL SIGNS:  Vital Signs Last 24 Hrs  T(C): 36.6 (24 Jan 2020 08:20), Max: 36.8 (23 Jan 2020 16:21)  T(F): 97.9 (24 Jan 2020 08:20), Max: 98.3 (23 Jan 2020 16:21)  HR: 80 (24 Jan 2020 08:20) (80 - 88)  BP: 132/87 (24 Jan 2020 08:20) (132/87 - 149/85)  BP(mean): --  RR: 16 (24 Jan 2020 08:20) (16 - 16)  SpO2: 97% (24 Jan 2020 08:20) (95% - 97%)    Physical Exam:   GEN: NAD, AAOx3, malodorous  HEENT: MMM,   CV: S1 S2 RRR,  Lung: CTAB  Abd: soft NT ND   Ext: RLE in cast from thigh to foot    MEDICATIONS:  MEDICATIONS  (STANDING):  acetaminophen   Tablet .. 975 milliGRAM(s) Oral every 8 hours  amLODIPine   Tablet 10 milliGRAM(s) Oral daily  ascorbic acid 500 milliGRAM(s) Oral daily  aspirin enteric coated 325 milliGRAM(s) Oral two times a day  calcium carbonate   1250 mG (OsCal) 1 Tablet(s) Oral daily  celecoxib 200 milliGRAM(s) Oral two times a day  cholecalciferol 1000 Unit(s) Oral every other day  gabapentin 300 milliGRAM(s) Oral three times a day  lactated ringers. 1000 milliLiter(s) (120 mL/Hr) IV Continuous <Continuous>  levothyroxine 88 MICROGram(s) Oral daily  pantoprazole    Tablet 40 milliGRAM(s) Oral before breakfast  polyethylene glycol 3350 17 Gram(s) Oral daily    MEDICATIONS  (PRN):  aluminum hydroxide/magnesium hydroxide/simethicone Suspension 20 milliLiter(s) Oral four times a day PRN Indigestion  bisacodyl Suppository 10 milliGRAM(s) Rectal daily PRN If no bowel movement by postoperative day #2  cyclobenzaprine 5 milliGRAM(s) Oral three times a day PRN Muscle Spasm  guaiFENesin   Syrup  (Sugar-Free) 200 milliGRAM(s) Oral every 6 hours PRN cold  HYDROmorphone   Tablet 4 milliGRAM(s) Oral every 4 hours PRN Moderate Pain (4 - 6)  HYDROmorphone   Tablet 6 milliGRAM(s) Oral every 4 hours PRN Severe Pain (7 - 10)  HYDROmorphone  Injectable 1 milliGRAM(s) IV Push every 3 hours PRN breakthrough pain  LORazepam   Injectable 2 milliGRAM(s) IV Push every 1 hour PRN CIWA-Ar score 8 or greater  magnesium hydroxide Suspension 30 milliLiter(s) Oral daily PRN Constipation--2nd Line  ondansetron Injectable 4 milliGRAM(s) IV Push every 6 hours PRN Nausea and/or Vomiting  senna 2 Tablet(s) Oral at bedtime PRN Constipation--1st Line  zolpidem 5 milliGRAM(s) Oral at bedtime PRN Insomnia      ALLERGIES:  Allergies    No Known Allergies    Intolerances        LABS:                        12.5   7.98  )-----------( 148      ( 23 Jan 2020 07:04 )             36.3     01-23    139  |  103  |  16  ----------------------------<  123<H>  3.8   |  25  |  0.70    Ca    8.4      23 Jan 2020 07:04          CAPILLARY BLOOD GLUCOSE          RADIOLOGY & ADDITIONAL TESTS: Reviewed.    ASSESSMENT:    PLAN:

## 2020-01-24 NOTE — DISCHARGE NOTE NURSING/CASE MANAGEMENT/SOCIAL WORK - PATIENT PORTAL LINK FT
You can access the FollowMyHealth Patient Portal offered by [facility] by registering at the following website: [email or web address]. By joining [employer]’s FollowMyHealth portal, you will also be able to view your health information using other applications (apps) compatible with our system.

## 2020-01-30 ENCOUNTER — OUTPATIENT (OUTPATIENT)
Dept: OUTPATIENT SERVICES | Facility: HOSPITAL | Age: 65
LOS: 1 days | End: 2020-01-30
Payer: MEDICAID

## 2020-01-30 ENCOUNTER — APPOINTMENT (OUTPATIENT)
Dept: ORTHOPEDIC SURGERY | Facility: CLINIC | Age: 65
End: 2020-01-30
Payer: MEDICAID

## 2020-01-30 DIAGNOSIS — Z90.89 ACQUIRED ABSENCE OF OTHER ORGANS: Chronic | ICD-10-CM

## 2020-01-30 DIAGNOSIS — S82.891A OTHER FRACTURE OF RIGHT LOWER LEG, INITIAL ENCOUNTER FOR CLOSED FRACTURE: Chronic | ICD-10-CM

## 2020-01-30 PROCEDURE — 73560 X-RAY EXAM OF KNEE 1 OR 2: CPT

## 2020-01-30 PROCEDURE — 73560 X-RAY EXAM OF KNEE 1 OR 2: CPT | Mod: 26,RT

## 2020-01-30 PROCEDURE — 99024 POSTOP FOLLOW-UP VISIT: CPT

## 2020-01-30 NOTE — HISTORY OF PRESENT ILLNESS
[de-identified] : r knee patients first visit following revision R patella ORIF.  [de-identified] : 64M s/p R patella ORIF w failure of hardware now s/p revision R patella ORIF. Pt complaining of discomfort from cast and ulcer over Achilles tendon. Pt requesting to have cast removed in favor of Sriram brace. Discussed w patient likelihood that patient's fracture may gap open again if not left in cast for addt'l 4 weeks. Pt reluctant but agreed to continue in long leg cast. [de-identified] : RLE: Patient in long leg fiberglass cast\par Cast window made anteriorly over incision site\par Incision well appearing w staples in place, no signs of infection. Staples removed, incision appears in appropriate stages of healing; Steri strips placed, new cast padding added and area re-wrapped w new fiberglass.\par Additional cast window made posteriorly over Achilles, revealed small area of erythema without skin breakdown over Achilles, new cast padding placed and fiberglass re-wrapped more proximally to allow for ulcer healing [de-identified] : R knee XR shows stable ORIF hardware in place [de-identified] : 64M s/p revision R patella ORIF w stable hardware and well-appearing incision [de-identified] : Staples removed today\par Steri strips placed\par Continue long leg cast x 4 weeks\par RTC in 4 weeks for cast removal and transition to [place] brace

## 2020-02-03 DIAGNOSIS — T84.032A MECHANICAL LOOSENING OF INTERNAL RIGHT KNEE PROSTHETIC JOINT, INITIAL ENCOUNTER: ICD-10-CM

## 2020-02-03 DIAGNOSIS — S82.001A UNSPECIFIED FRACTURE OF RIGHT PATELLA, INITIAL ENCOUNTER FOR CLOSED FRACTURE: ICD-10-CM

## 2020-02-03 DIAGNOSIS — I10 ESSENTIAL (PRIMARY) HYPERTENSION: ICD-10-CM

## 2020-02-03 DIAGNOSIS — E03.9 HYPOTHYROIDISM, UNSPECIFIED: ICD-10-CM

## 2020-02-03 DIAGNOSIS — M97.11XA PERIPROSTHETIC FRACTURE AROUND INTERNAL PROSTHETIC RIGHT KNEE JOINT, INITIAL ENCOUNTER: ICD-10-CM

## 2020-02-10 ENCOUNTER — FORM ENCOUNTER (OUTPATIENT)
Age: 65
End: 2020-02-10

## 2020-02-11 ENCOUNTER — OUTPATIENT (OUTPATIENT)
Dept: OUTPATIENT SERVICES | Facility: HOSPITAL | Age: 65
LOS: 1 days | End: 2020-02-11
Payer: MEDICAID

## 2020-02-11 ENCOUNTER — APPOINTMENT (OUTPATIENT)
Dept: CT IMAGING | Facility: HOSPITAL | Age: 65
End: 2020-02-11
Payer: MEDICAID

## 2020-02-11 ENCOUNTER — APPOINTMENT (OUTPATIENT)
Dept: PULMONOLOGY | Facility: CLINIC | Age: 65
End: 2020-02-11
Payer: MEDICAID

## 2020-02-11 VITALS — BODY MASS INDEX: 24.97 KG/M2 | WEIGHT: 179 LBS

## 2020-02-11 DIAGNOSIS — Z90.89 ACQUIRED ABSENCE OF OTHER ORGANS: Chronic | ICD-10-CM

## 2020-02-11 DIAGNOSIS — S82.891A OTHER FRACTURE OF RIGHT LOWER LEG, INITIAL ENCOUNTER FOR CLOSED FRACTURE: Chronic | ICD-10-CM

## 2020-02-11 PROCEDURE — G0297: CPT | Mod: 26

## 2020-02-11 PROCEDURE — G0296 VISIT TO DETERM LDCT ELIG: CPT

## 2020-02-11 PROCEDURE — G0297: CPT

## 2020-02-11 NOTE — HISTORY OF PRESENT ILLNESS
[Current] : current smoker [_____ pack-years] : [unfilled] pack-years [TextBox_13] : Referred by Dr. Onofre, now sees a Dyllan Galarza\par \par Mr. PATY VIVAS is a 64 year old man with a history of HTN, tinnitus\par \par Initial LDCT 17 Lung Rads 2S\par did not have a cariology evaluation or ultrasound of aorta since report last year\par Annual LDCT 18  Lung Rads 2\par \par Had a scan that showed some calcifiation of one of his coronary arteries\par \par He was seen in the office today for review of eligibility for, as well as, discussion of Low-Dose CT lung cancer screening program as part of his annual screening. Reviewed and confirmed that the patient meets screening eligibility criteria:\par -Age: 64 year \par Smoking status:\par -Current smoker\par -Number of pack(s) per day: 1\par -Number of year smoked: 50\par -Number of pack years smokin\par \par Denies currently smoking for philosophical reasons but the smell is evident on his breath and clothes\par \par Mr. VIVAS denies any signs or symptoms of lung cancer including new cough, change in cough, hemoptysis and unintentional weight loss. Broke his kneecap and is in a cast\par \par Mr. VIVAS denies any personal history of lung cancer. No lung cancer in a 1st degree relative. Denies any history of lung disease. History of possible occupational exposure to asbestos when worked in [employer]. \par  [TextBox_8] : 51

## 2020-02-11 NOTE — DATA REVIEWED
[Lung Cancer Screening] : Patient underwent lung cancer screening [S] : S [2] : 2 [TextBox_12] : 01/17 [TextBox_27] : 01/18

## 2020-02-11 NOTE — PLAN
[Smoking Cessation Guidance Provided] : Smoking cessation guidance was provided to patient [Smoking Cessation] : smoking cessation [FreeTextEntry1] : Plan:\par -Low Dose CT chest for lung cancer screening\par -Follow up with patient and his referring provider after his LDCT results have been reviewed by the multi-disciplinary clinical team\par -Encouraged smoking cessation\par -Patient declined [facility] Smokers Quitline literature\par -Declined referral to CTC\par \par Engaged in shared decision making with Mr. PATY VIVAS . Answered all questions. He verbalized understanding and agreement. He knows to call back with any questions or concerns

## 2020-02-11 NOTE — ASSESSMENT
[Discussed Risks and Advised to Quit Smoking] : Discussed risks and advised to quit smoking [Not Ready] : Patient is not ready for cessation intervention [Pre-contemplation] : Pre-contemplation: The patient is not thinking about quitting smoking in the next 6 months [Patient refused treatment] : Patient refused treatment [de-identified] : patient advised quitting smoking is the most important thing he can do for his health

## 2020-02-11 NOTE — REASON FOR VISIT
[Low-Dose CT Screening Discussion] : low-dose CT lung cancer screening discussion [Review of Eligibility] : review of eligibility [Annual Follow-Up] : an annual follow-up visit [Face-to-Face Visit] : face-to-face visit

## 2020-02-13 ENCOUNTER — APPOINTMENT (OUTPATIENT)
Dept: ORTHOPEDIC SURGERY | Facility: CLINIC | Age: 65
End: 2020-02-13
Payer: MEDICAID

## 2020-02-13 ENCOUNTER — OUTPATIENT (OUTPATIENT)
Dept: OUTPATIENT SERVICES | Facility: HOSPITAL | Age: 65
LOS: 1 days | End: 2020-02-13
Payer: MEDICAID

## 2020-02-13 DIAGNOSIS — Z90.89 ACQUIRED ABSENCE OF OTHER ORGANS: Chronic | ICD-10-CM

## 2020-02-13 DIAGNOSIS — S82.891A OTHER FRACTURE OF RIGHT LOWER LEG, INITIAL ENCOUNTER FOR CLOSED FRACTURE: Chronic | ICD-10-CM

## 2020-02-13 PROCEDURE — 73560 X-RAY EXAM OF KNEE 1 OR 2: CPT | Mod: 26,RT

## 2020-02-13 PROCEDURE — 99024 POSTOP FOLLOW-UP VISIT: CPT

## 2020-02-13 PROCEDURE — 73560 X-RAY EXAM OF KNEE 1 OR 2: CPT

## 2020-02-13 NOTE — HISTORY OF PRESENT ILLNESS
[Neuro Intact] : an unremarkable neurological exam [Vascular Intact] : ~T peripheral vascular exam normal [de-identified] : R patella ORIF revision on 1/20/2020 [de-identified] : 64M ~3 weeks s/p R patella revision ORIF. Pt complaning of discomfort from cast and requesting xrays of his R knee [de-identified] : Cast is intact \par  [de-identified] : XR performed today showing hardware intact and fracture reduced [de-identified] : 64M s/p R patella revision ORIF [de-identified] : f/u next week for cast removal and conversion to hinged knee brace

## 2020-02-19 ENCOUNTER — FORM ENCOUNTER (OUTPATIENT)
Age: 65
End: 2020-02-19

## 2020-02-20 ENCOUNTER — OUTPATIENT (OUTPATIENT)
Dept: OUTPATIENT SERVICES | Facility: HOSPITAL | Age: 65
LOS: 1 days | End: 2020-02-20
Payer: MEDICAID

## 2020-02-20 ENCOUNTER — APPOINTMENT (OUTPATIENT)
Dept: ORTHOPEDIC SURGERY | Facility: CLINIC | Age: 65
End: 2020-02-20
Payer: MEDICAID

## 2020-02-20 DIAGNOSIS — Z90.89 ACQUIRED ABSENCE OF OTHER ORGANS: Chronic | ICD-10-CM

## 2020-02-20 DIAGNOSIS — S82.891A OTHER FRACTURE OF RIGHT LOWER LEG, INITIAL ENCOUNTER FOR CLOSED FRACTURE: Chronic | ICD-10-CM

## 2020-02-20 PROCEDURE — 73560 X-RAY EXAM OF KNEE 1 OR 2: CPT

## 2020-02-20 PROCEDURE — 73560 X-RAY EXAM OF KNEE 1 OR 2: CPT | Mod: 26,RT

## 2020-02-20 PROCEDURE — 99024 POSTOP FOLLOW-UP VISIT: CPT

## 2020-02-20 PROCEDURE — 29705 RMVL/BIVLV FULL ARM/LEG CAST: CPT | Mod: 58

## 2020-03-19 ENCOUNTER — APPOINTMENT (OUTPATIENT)
Dept: ORTHOPEDIC SURGERY | Facility: CLINIC | Age: 65
End: 2020-03-19
Payer: MEDICAID

## 2020-03-19 ENCOUNTER — RESULT REVIEW (OUTPATIENT)
Age: 65
End: 2020-03-19

## 2020-03-19 ENCOUNTER — OUTPATIENT (OUTPATIENT)
Dept: OUTPATIENT SERVICES | Facility: HOSPITAL | Age: 65
LOS: 1 days | End: 2020-03-19
Payer: MEDICAID

## 2020-03-19 DIAGNOSIS — S82.891A OTHER FRACTURE OF RIGHT LOWER LEG, INITIAL ENCOUNTER FOR CLOSED FRACTURE: Chronic | ICD-10-CM

## 2020-03-19 DIAGNOSIS — Z90.89 ACQUIRED ABSENCE OF OTHER ORGANS: Chronic | ICD-10-CM

## 2020-03-19 PROCEDURE — 99024 POSTOP FOLLOW-UP VISIT: CPT

## 2020-03-19 PROCEDURE — 73560 X-RAY EXAM OF KNEE 1 OR 2: CPT | Mod: 26,RT

## 2020-03-19 PROCEDURE — 73560 X-RAY EXAM OF KNEE 1 OR 2: CPT

## 2020-03-19 NOTE — HISTORY OF PRESENT ILLNESS
[Healed] : healed [Intact] : was intact [None] : had no drainage [Normal Skin] : normal appearance [Xray (Date:___)] : [unfilled] Xray -  [Hardware in Good Position] : hardware in good position [Good Overall Alignment] : good overall alignment [Fixation Site Stable] : fixation site appears stable [Doing Well] : is doing well [Excellent Pain Control] : has excellent pain control [No Sign of Infection] : is showing no signs of infection [de-identified] : Patient s/p revision patella ORIF 1/20/2020. Presents after last visit 2/13/2020. Now 2 month postop here for repeat XRs and advancement of bledose [de-identified] : Leg with mild noted muscle atrophy of quadriceps\par Incision c/d/i\par NVI \par WWP [de-identified] : hardware intact, no displacement or malalignment [de-identified] : Patient will continue [place] brace\par Will be WBAT RLE with [place] in 0-90 degrees ROM\par Pt encouraged to continue calcium/vitamin D supplementation\par Will RTC in 4 weeks for repeat XRs and likely progression of Sriram ROM

## 2020-03-19 NOTE — HISTORY OF PRESENT ILLNESS
[Healed] : healed [Intact] : was intact [None] : had no drainage [Normal Skin] : normal appearance [Xray (Date:___)] : [unfilled] Xray -  [Hardware in Good Position] : hardware in good position [Good Overall Alignment] : good overall alignment [Fixation Site Stable] : fixation site appears stable [Doing Well] : is doing well [Excellent Pain Control] : has excellent pain control [No Sign of Infection] : is showing no signs of infection [de-identified] : Patient s/p revision patella ORIF 1/20/2020. Presents after last visit 2/13/2020. Now 2 month postop here for repeat XRs and advancement of bledose [de-identified] : Leg with mild noted muscle atrophy of quadriceps\par Incision c/d/i\par NVI \par WWP [de-identified] : hardware intact, no displacement or malalignment [de-identified] : Patient will continue [place] brace\par Will be WBAT RLE with [place] in 0-90 degrees ROM\par Pt encouraged to continue calcium/vitamin D supplementation\par Will RTC in 4 weeks for repeat XRs and likely progression of Sriram ROM

## 2020-03-20 NOTE — HISTORY OF PRESENT ILLNESS
[___ Months Post Op] : [unfilled] months post op [de-identified] : s/p revision patella ORIF 1/20/2020 [de-identified] : Leg with mild noted muscle atrophy of quadriceps\par Incision c/d/i\par NVI \par WWP. The surgical incision site(s) was healed was intact and had no drainage. The surrounding skin findings included normal appearance.  [de-identified] : Imagin2020 Xray -  hardware in good position, good overall alignment and fixation site appears stable. hardware intact, no displacement or malalignment.  [de-identified] : Postoperatively, the patient is doing well, has excellent pain control and is showing no signs of infection.  [de-identified] : Patient will continue [place] brace\par Will be WBAT RLE with [place] in 0-90 degrees ROM\par Pt encouraged to continue calcium/vitamin D supplementation\par Will RTC in 4 weeks for repeat XRs

## 2020-04-23 ENCOUNTER — APPOINTMENT (OUTPATIENT)
Dept: ORTHOPEDIC SURGERY | Facility: CLINIC | Age: 65
End: 2020-04-23

## 2020-05-21 ENCOUNTER — APPOINTMENT (OUTPATIENT)
Dept: ORTHOPEDIC SURGERY | Facility: CLINIC | Age: 65
End: 2020-05-21
Payer: MEDICAID

## 2020-05-21 ENCOUNTER — OUTPATIENT (OUTPATIENT)
Dept: OUTPATIENT SERVICES | Facility: HOSPITAL | Age: 65
LOS: 1 days | End: 2020-05-21
Payer: MEDICAID

## 2020-05-21 ENCOUNTER — RESULT REVIEW (OUTPATIENT)
Age: 65
End: 2020-05-21

## 2020-05-21 DIAGNOSIS — Z90.89 ACQUIRED ABSENCE OF OTHER ORGANS: Chronic | ICD-10-CM

## 2020-05-21 DIAGNOSIS — S82.891A OTHER FRACTURE OF RIGHT LOWER LEG, INITIAL ENCOUNTER FOR CLOSED FRACTURE: Chronic | ICD-10-CM

## 2020-05-21 DIAGNOSIS — S82.009A UNSPECIFIED FRACTURE OF UNSPECIFIED PATELLA, INITIAL ENCOUNTER FOR CLOSED FRACTURE: ICD-10-CM

## 2020-05-21 PROCEDURE — 73564 X-RAY EXAM KNEE 4 OR MORE: CPT

## 2020-05-21 PROCEDURE — 99212 OFFICE O/P EST SF 10 MIN: CPT

## 2020-05-21 PROCEDURE — 73564 X-RAY EXAM KNEE 4 OR MORE: CPT | Mod: 26,RT

## 2020-05-22 PROBLEM — S82.009A PATELLA FRACTURE: Status: ACTIVE | Noted: 2020-02-21

## 2020-05-22 NOTE — ASSESSMENT
[FreeTextEntry1] : 64M 4 months s/p revision ORIF R patella\par -Progressing well\par -Discussed risks associated w patient received injections into surgical site -- Concern for infection -- Recommended patient not receive injections\par -May gradually increase activity level -- jogging as tolerated\par -Continue to use cane/brace as needed\par -No high impact, cutting, quick direction changes\par -Ice/OTC analgesia PRN\par -RTC 2 months for repeat XR

## 2020-05-22 NOTE — HISTORY OF PRESENT ILLNESS
[de-identified] : 64M 4 months s/p revision ORIF R patella doing well. Pt has been in physical therapy x 8 weeks progressing well. Using cane/brace for days w knee soreness or high activity levels. Otherwise no complaints. Able to walk up to 5 miles in a day. Has been doing low impact cardio (elliptical, stairmaster, walking) w PT. Pt states he has begun receieving injections w PRP and stem cells into his R knee to promote cartilage regrowth.

## 2020-05-22 NOTE — PHYSICAL EXAM
Problem: Activity/Exercise Intolerance  Goal: Patient will tolerate activity/exercise    Intervention: Progress activity per Cardiac Rehab protocol  Intervention Status  Done  Intervention: Progressive graded ambulation  Intervention Status  Done      Intervention: Collaborate with nursing to ensure ambulation 4-6 times per day  Intervention Status  Done  Intervention: Upper and lower extremity calisthenics per Cardiac Rehab protocol  Intervention Status  Done  Intervention: Monitor and document progressive activity response  Intervention Status  Done  Intervention: Encourage hourly incentive spirometry, cough and deep breathe  Intervention Status  Done         [de-identified] : General: Well-appearing middle-aged male; NAD; A&Ox3\par R Knee: Well-healed midline surgical scar\par Mild quadriceps atrophy which is improved\par Mild persistent soft tissue swelling\par Knee ROM 0-120\par No extensor lag [de-identified] : R Knee XR show interval healing of R patella

## 2020-07-15 ENCOUNTER — RESULT REVIEW (OUTPATIENT)
Age: 65
End: 2020-07-15

## 2020-07-15 ENCOUNTER — APPOINTMENT (OUTPATIENT)
Dept: ORTHOPEDIC SURGERY | Facility: CLINIC | Age: 65
End: 2020-07-15

## 2020-07-15 ENCOUNTER — OUTPATIENT (OUTPATIENT)
Dept: OUTPATIENT SERVICES | Facility: HOSPITAL | Age: 65
LOS: 1 days | End: 2020-07-15
Payer: MEDICAID

## 2020-07-15 DIAGNOSIS — Z90.89 ACQUIRED ABSENCE OF OTHER ORGANS: Chronic | ICD-10-CM

## 2020-07-15 DIAGNOSIS — S82.891A OTHER FRACTURE OF RIGHT LOWER LEG, INITIAL ENCOUNTER FOR CLOSED FRACTURE: Chronic | ICD-10-CM

## 2020-07-15 PROCEDURE — 73564 X-RAY EXAM KNEE 4 OR MORE: CPT | Mod: 26,RT

## 2020-07-15 PROCEDURE — 73564 X-RAY EXAM KNEE 4 OR MORE: CPT

## 2020-07-15 NOTE — PHYSICAL EXAM
[de-identified] : General: Resting comfortably, NAD\par RLE: Surgical scar well-appearing without any surrounding erythema, drainage, or tenderness.

## 2020-07-15 NOTE — HISTORY OF PRESENT ILLNESS
[de-identified] : 64M s/p R patella revision ORIF Jan 2020. Patient denies pain at present, no difficulty with ambulation. Denies any new symptoms including erythema, swelling, drainage, fevers, chills.

## 2020-07-15 NOTE — ASSESSMENT
[FreeTextEntry1] : 64M s/p revision R patella ORIF, 6 month postop visit. Patient will follow up in 3 months in October 2020 for possible removal of hardware. Patient instructed to return as soon as possible if he starts to experience any swelling, erythema, drainage, or new pain at surgical site. Range of motin is encouraged Patient understands and is agreeable to plan.

## 2020-07-15 NOTE — REASON FOR VISIT
[Follow-Up Visit] : a follow-up visit for [Knee Pain] : knee pain [FreeTextEntry2] : right [Post Operative Visit] : a post operative visit for

## 2020-07-15 NOTE — PHYSICAL EXAM
[de-identified] : General: Resting comfortably, NAD\par RLE: Surgical scar well-appearing without any surrounding erythema, drainage, or tenderness.

## 2020-07-15 NOTE — HISTORY OF PRESENT ILLNESS
[de-identified] : 64M s/p R patella revision ORIF Jan 2020. Patient denies pain at present, no difficulty with ambulation. Denies any new symptoms including erythema, swelling, drainage, fevers, chills.

## 2020-07-16 ENCOUNTER — APPOINTMENT (OUTPATIENT)
Dept: ORTHOPEDIC SURGERY | Facility: CLINIC | Age: 65
End: 2020-07-16

## 2020-08-04 ENCOUNTER — APPOINTMENT (OUTPATIENT)
Dept: UROLOGY | Facility: CLINIC | Age: 65
End: 2020-08-04
Payer: MEDICAID

## 2020-08-04 VITALS
TEMPERATURE: 97.8 F | HEART RATE: 84 BPM | HEIGHT: 71 IN | BODY MASS INDEX: 25.9 KG/M2 | WEIGHT: 185 LBS | OXYGEN SATURATION: 96 % | SYSTOLIC BLOOD PRESSURE: 162 MMHG | DIASTOLIC BLOOD PRESSURE: 72 MMHG

## 2020-08-04 PROCEDURE — 99204 OFFICE O/P NEW MOD 45 MIN: CPT

## 2020-08-05 NOTE — PHYSICAL EXAM
[General Appearance - Well Developed] : well developed [General Appearance - Well Nourished] : well nourished [Normal Appearance] : normal appearance [General Appearance - In No Acute Distress] : no acute distress [Well Groomed] : well groomed [Edema] : no peripheral edema [Exaggerated Use Of Accessory Muscles For Inspiration] : no accessory muscle use [Respiration, Rhythm And Depth] : normal respiratory rhythm and effort [Urinary Bladder Findings] : the bladder was normal on palpation [Normal Station and Gait] : the gait and station were normal for the patient's age [Oriented To Time, Place, And Person] : oriented to person, place, and time [] : no rash [No Focal Deficits] : no focal deficits [Not Anxious] : not anxious [Affect] : the affect was normal [Mood] : the mood was normal [Abdomen Soft] : soft [Abdomen Tenderness] : non-tender [Abdomen Hernia] : no hernia was discovered [Costovertebral Angle Tenderness] : no ~M costovertebral angle tenderness [FreeTextEntry1] : patient refusing OLIVIA [No Palpable Adenopathy] : no palpable adenopathy

## 2020-08-05 NOTE — HISTORY OF PRESENT ILLNESS
[FreeTextEntry1] : This is a 64 year old male who presents today for elevated PSA.\par  \par PSA 2.9 on 7/28/20\par Patient states he had a PSA done by physical therapy office and was 3.4 a couple of months ago.\par \par underwent full body MRI - PIRADS 1/2 lesion noted\par No family history of prostate cancer\par No history of prostate biopsy\par \par No urinary difficulty, stream quality "ok", no need to push to urinate, no interruption of stream \par Difficult to hold urine over the past few years,  " not comfortable holding it "\par Denies any cur incontinence, no hematuria, no dysuria.\par

## 2020-09-16 NOTE — PRE-OP CHECKLIST - ISOLATION PRECAUTIONS
----- Message from Tor Mari MD sent at 9/16/2020  1:05 PM EDT -----  Low fat or Mediterranean diet. Everything else looks real good.
none

## 2020-10-01 ENCOUNTER — RESULT REVIEW (OUTPATIENT)
Age: 65
End: 2020-10-01

## 2020-10-01 ENCOUNTER — OUTPATIENT (OUTPATIENT)
Dept: OUTPATIENT SERVICES | Facility: HOSPITAL | Age: 65
LOS: 1 days | End: 2020-10-01
Payer: MEDICAID

## 2020-10-01 ENCOUNTER — APPOINTMENT (OUTPATIENT)
Dept: ORTHOPEDIC SURGERY | Facility: CLINIC | Age: 65
End: 2020-10-01
Payer: MEDICAID

## 2020-10-01 DIAGNOSIS — T84.84XA PAIN DUE TO INTERNAL ORTHOPEDIC PROSTHETIC DEVICES, IMPLANTS AND GRAFTS, INITIAL ENCOUNTER: ICD-10-CM

## 2020-10-01 DIAGNOSIS — S82.891A OTHER FRACTURE OF RIGHT LOWER LEG, INITIAL ENCOUNTER FOR CLOSED FRACTURE: Chronic | ICD-10-CM

## 2020-10-01 DIAGNOSIS — Z90.89 ACQUIRED ABSENCE OF OTHER ORGANS: Chronic | ICD-10-CM

## 2020-10-01 PROCEDURE — 73564 X-RAY EXAM KNEE 4 OR MORE: CPT | Mod: 26,RT

## 2020-10-01 PROCEDURE — 99213 OFFICE O/P EST LOW 20 MIN: CPT

## 2020-10-01 PROCEDURE — 73564 X-RAY EXAM KNEE 4 OR MORE: CPT

## 2020-10-02 PROBLEM — T84.84XA PAINFUL ORTHOPAEDIC HARDWARE: Status: ACTIVE | Noted: 2018-04-11

## 2020-10-08 ENCOUNTER — TRANSCRIPTION ENCOUNTER (OUTPATIENT)
Age: 65
End: 2020-10-08

## 2020-10-09 ENCOUNTER — OUTPATIENT (OUTPATIENT)
Dept: OUTPATIENT SERVICES | Facility: HOSPITAL | Age: 65
LOS: 1 days | Discharge: HOME | End: 2020-10-09
Payer: MEDICAID

## 2020-10-09 ENCOUNTER — APPOINTMENT (OUTPATIENT)
Dept: ORTHOPEDIC SURGERY | Facility: AMBULATORY SURGERY CENTER | Age: 65
End: 2020-10-09

## 2020-10-09 DIAGNOSIS — Z90.89 ACQUIRED ABSENCE OF OTHER ORGANS: Chronic | ICD-10-CM

## 2020-10-09 DIAGNOSIS — S82.001D UNSPECIFIED FRACTURE OF RIGHT PATELLA, SUBSEQUENT ENCOUNTER FOR CLOSED FRACTURE WITH ROUTINE HEALING: ICD-10-CM

## 2020-10-09 DIAGNOSIS — S82.891A OTHER FRACTURE OF RIGHT LOWER LEG, INITIAL ENCOUNTER FOR CLOSED FRACTURE: Chronic | ICD-10-CM

## 2020-10-09 PROCEDURE — 20680 REMOVAL OF IMPLANT DEEP: CPT | Mod: RT

## 2020-10-15 NOTE — ASSESSMENT
[FreeTextEntry1] : 64M presenting symptomatic hardware of R knee s/p R patella ORIF in 01/2020\par - Fracture well healed without evidence of malunion/nonunion\par - Hardware in place\par - Plan for OR for MYCHAL\par - Risks and benefits of surgical intervention described with patient, pt agrees to proceed\par - Preoperative clearance

## 2020-10-15 NOTE — PHYSICAL EXAM
[de-identified] : General: AAOx3, NAD\par RLE: well healing incision over anterior knee\par No swelling/erythema, skin intact\par ROM full and symmetric to contralateral leg\par Motor 5/5\par SILT distally [de-identified] : XR shows well healed R patellar fracture, hardware intact without evidence of loosening

## 2020-10-15 NOTE — HISTORY OF PRESENT ILLNESS
[de-identified] : 64M with Hx of comminuted R patellar fx in 01/2020, s/p ORIF by Dr. Aldana, now presenting with R knee pain. Pt denies any numbness/tingling or trauma. Notes continued dull aching anterior knee pain. Pt believes hardware is irritating and would like to get it removed.

## 2020-10-22 ENCOUNTER — APPOINTMENT (OUTPATIENT)
Dept: ORTHOPEDIC SURGERY | Facility: CLINIC | Age: 65
End: 2020-10-22
Payer: MEDICAID

## 2020-10-22 ENCOUNTER — OUTPATIENT (OUTPATIENT)
Dept: OUTPATIENT SERVICES | Facility: HOSPITAL | Age: 65
LOS: 1 days | End: 2020-10-22
Payer: MEDICAID

## 2020-10-22 ENCOUNTER — RESULT REVIEW (OUTPATIENT)
Age: 65
End: 2020-10-22

## 2020-10-22 DIAGNOSIS — Z90.89 ACQUIRED ABSENCE OF OTHER ORGANS: Chronic | ICD-10-CM

## 2020-10-22 DIAGNOSIS — S82.891A OTHER FRACTURE OF RIGHT LOWER LEG, INITIAL ENCOUNTER FOR CLOSED FRACTURE: Chronic | ICD-10-CM

## 2020-10-22 PROCEDURE — 73564 X-RAY EXAM KNEE 4 OR MORE: CPT

## 2020-10-22 PROCEDURE — 99024 POSTOP FOLLOW-UP VISIT: CPT

## 2020-10-22 PROCEDURE — 73564 X-RAY EXAM KNEE 4 OR MORE: CPT | Mod: 26,RT

## 2020-10-22 NOTE — HISTORY OF PRESENT ILLNESS
[3] : the patient reports pain that is 3/10 in severity [Clean/Dry/Intact] : clean, dry and intact [Healed] : healed [Neuro Intact] : an unremarkable neurological exam [Vascular Intact] : ~T peripheral vascular exam normal [Chills] : no chills [Fever] : no fever [Discharge] : absent of discharge [Swelling] : not swollen [Dehiscence] : not dehisced [Doing Well] : is doing well [Excellent Pain Control] : has excellent pain control [No Sign of Infection] : is showing no signs of infection [Staples Removed] : staples were removed [de-identified] : 1st post op visit right knee removal hardware  [de-identified] : 2 weeks s/p R patella MYCHAL. overall doing well,  [de-identified] : ROM 0-110 [de-identified] : XR AP/lateral R knee shows removal of all harware, skin staples intact [de-identified] : 64M s/p R patella MYCHAL, 2 weeks prior, doing great, minimal pain, ROM improving [de-identified] : PT Rx given to work on strengthening and ROM\par Percocet PRN given\par May wean brace at this time \par F/u 6-8 weeks PRN

## 2021-03-08 ENCOUNTER — APPOINTMENT (OUTPATIENT)
Dept: PULMONOLOGY | Facility: CLINIC | Age: 66
End: 2021-03-08
Payer: MEDICAID

## 2021-03-08 VITALS — HEIGHT: 71 IN | BODY MASS INDEX: 24.5 KG/M2 | WEIGHT: 175 LBS

## 2021-03-08 DIAGNOSIS — Z87.891 PERSONAL HISTORY OF NICOTINE DEPENDENCE: ICD-10-CM

## 2021-03-08 PROCEDURE — ACP01: CPT | Mod: NC

## 2021-03-08 NOTE — ASSESSMENT
[Declined] : Patient has declined cessation intervention [Pre-contemplation] : Pre-contemplation: The patient is not thinking about quitting smoking in the next 6 months

## 2021-03-08 NOTE — PLAN
[Smoking Cessation] : smoking cessation [FreeTextEntry1] : Plan:\par -Low Dose CT chest for lung cancer screening\par -Follow up with patient and his referring provider after his LDCT results have been reviewed by the multi-disciplinary clinical team\par -Encouraged smoking cessation\par \par \par Engaged in shared decision making with Mr. PATY VIVAS . Answered all questions. He verbalized understanding and agreement. He knows to call back with any questions or concerns

## 2021-03-08 NOTE — HISTORY OF PRESENT ILLNESS
[Current] : current smoker [_____ pack-years] : [unfilled] pack-years [TextBox_13] : Referred by Dr. Onofre\par \par Mr. PATY VIVAS is a 65 year old man with a history of HTN, tinnitus\par \par Initial LDCT 17 Lung Rads 2S\par did not have a cariology evaluation or ultrasound of aorta since report last year\par Annual LDCT 18 Lung Rads 2\par Annual LDCT 2019 LR 2\par Annual LDCT 2020 LR 1\par \par Had a scan that showed some calcification of one of his coronary arteries\par \par Over the phone today we reviewed eligibility for, as well as, discussion of Low-Dose CT lung cancer screening program as part of his annual screening. Reviewed and confirmed that the patient meets screening eligibility criteria:\par -Age: 65 year \par Smoking status:\par -Current smoker\par -Number of pack(s) per day: 1\par -Number of year smoked: 51\par -Number of pack years smokin\par \par Denies currently smoking for philosophical reasons\par \par Mr. VIVAS denies any signs or symptoms of lung cancer including new cough, change in cough, hemoptysis and unintentional weight loss. \par \par Mr. VIVAS denies any personal history of lung cancer. No lung cancer in a 1st degree relative. Denies any history of lung disease. History of possible occupational exposure to asbestos when worked in [employer]. \par  [TextBox_6] : 1 [TextBox_8] : 51

## 2021-03-08 NOTE — REASON FOR VISIT
[Home] : at home, [unfilled] , at the time of the visit. [Medical Office: ([facility])___] : at the medical office located in  [Verbal consent obtained from patient] : the patient, [unfilled] [Annual Follow-Up] : an annual follow-up visit [Review of Eligibility] : review of eligibility [Low-Dose CT Screening Discussion] : low-dose CT lung cancer screening discussion

## 2021-03-08 NOTE — DATA REVIEWED
[Lung Cancer Screening] : Patient underwent lung cancer screening [2] : 2 [S] : S [1] : 1 [TextBox_12] : 01/2017 [TextBox_42] : 02/2020  [TextBox_52] : 4

## 2021-03-26 ENCOUNTER — APPOINTMENT (OUTPATIENT)
Dept: CT IMAGING | Facility: HOSPITAL | Age: 66
End: 2021-03-26

## 2021-03-26 ENCOUNTER — RESULT REVIEW (OUTPATIENT)
Age: 66
End: 2021-03-26

## 2021-03-26 ENCOUNTER — OUTPATIENT (OUTPATIENT)
Dept: OUTPATIENT SERVICES | Facility: HOSPITAL | Age: 66
LOS: 1 days | End: 2021-03-26
Payer: MEDICAID

## 2021-03-26 DIAGNOSIS — Z90.89 ACQUIRED ABSENCE OF OTHER ORGANS: Chronic | ICD-10-CM

## 2021-03-26 DIAGNOSIS — S82.891A OTHER FRACTURE OF RIGHT LOWER LEG, INITIAL ENCOUNTER FOR CLOSED FRACTURE: Chronic | ICD-10-CM

## 2021-03-26 PROCEDURE — 71271 CT THORAX LUNG CANCER SCR C-: CPT | Mod: 26

## 2021-03-26 PROCEDURE — 71271 CT THORAX LUNG CANCER SCR C-: CPT

## 2021-03-29 ENCOUNTER — NON-APPOINTMENT (OUTPATIENT)
Age: 66
End: 2021-03-29

## 2021-06-10 ENCOUNTER — APPOINTMENT (OUTPATIENT)
Dept: ORTHOPEDIC SURGERY | Facility: CLINIC | Age: 66
End: 2021-06-10

## 2021-06-10 ENCOUNTER — RESULT REVIEW (OUTPATIENT)
Age: 66
End: 2021-06-10

## 2021-06-10 ENCOUNTER — OUTPATIENT (OUTPATIENT)
Dept: OUTPATIENT SERVICES | Facility: HOSPITAL | Age: 66
LOS: 1 days | End: 2021-06-10
Payer: MEDICAID

## 2021-06-10 DIAGNOSIS — Z90.89 ACQUIRED ABSENCE OF OTHER ORGANS: Chronic | ICD-10-CM

## 2021-06-10 DIAGNOSIS — S82.891A OTHER FRACTURE OF RIGHT LOWER LEG, INITIAL ENCOUNTER FOR CLOSED FRACTURE: Chronic | ICD-10-CM

## 2021-06-10 PROCEDURE — 73564 X-RAY EXAM KNEE 4 OR MORE: CPT

## 2021-06-10 PROCEDURE — 73564 X-RAY EXAM KNEE 4 OR MORE: CPT | Mod: 26,RT

## 2021-07-11 ENCOUNTER — EMERGENCY (EMERGENCY)
Facility: HOSPITAL | Age: 66
LOS: 1 days | Discharge: ANOTHER HOSPITAL | End: 2021-07-11
Attending: EMERGENCY MEDICINE | Admitting: EMERGENCY MEDICINE
Payer: MEDICAID

## 2021-07-11 VITALS
HEIGHT: 71 IN | HEART RATE: 90 BPM | RESPIRATION RATE: 20 BRPM | OXYGEN SATURATION: 96 % | TEMPERATURE: 99 F | SYSTOLIC BLOOD PRESSURE: 129 MMHG | WEIGHT: 175.05 LBS | DIASTOLIC BLOOD PRESSURE: 73 MMHG

## 2021-07-11 VITALS
TEMPERATURE: 98 F | RESPIRATION RATE: 18 BRPM | DIASTOLIC BLOOD PRESSURE: 82 MMHG | SYSTOLIC BLOOD PRESSURE: 150 MMHG | OXYGEN SATURATION: 97 % | HEART RATE: 84 BPM

## 2021-07-11 DIAGNOSIS — S22.41XA MULTIPLE FRACTURES OF RIBS, RIGHT SIDE, INITIAL ENCOUNTER FOR CLOSED FRACTURE: ICD-10-CM

## 2021-07-11 DIAGNOSIS — R07.81 PLEURODYNIA: ICD-10-CM

## 2021-07-11 DIAGNOSIS — Z90.89 ACQUIRED ABSENCE OF OTHER ORGANS: Chronic | ICD-10-CM

## 2021-07-11 DIAGNOSIS — Z87.442 PERSONAL HISTORY OF URINARY CALCULI: ICD-10-CM

## 2021-07-11 DIAGNOSIS — S82.891A OTHER FRACTURE OF RIGHT LOWER LEG, INITIAL ENCOUNTER FOR CLOSED FRACTURE: Chronic | ICD-10-CM

## 2021-07-11 DIAGNOSIS — T79.7XXA TRAUMATIC SUBCUTANEOUS EMPHYSEMA, INITIAL ENCOUNTER: ICD-10-CM

## 2021-07-11 DIAGNOSIS — I10 ESSENTIAL (PRIMARY) HYPERTENSION: ICD-10-CM

## 2021-07-11 DIAGNOSIS — Z87.891 PERSONAL HISTORY OF NICOTINE DEPENDENCE: ICD-10-CM

## 2021-07-11 DIAGNOSIS — E03.9 HYPOTHYROIDISM, UNSPECIFIED: ICD-10-CM
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
## 2021-07-11 LAB
ALBUMIN SERPL ELPH-MCNC: 4.2 G/DL — SIGNIFICANT CHANGE UP (ref 3.3–5)
ALP SERPL-CCNC: 71 U/L — SIGNIFICANT CHANGE UP (ref 40–120)
ALT FLD-CCNC: 26 U/L — SIGNIFICANT CHANGE UP (ref 10–45)
ANION GAP SERPL CALC-SCNC: 13 MMOL/L — SIGNIFICANT CHANGE UP (ref 5–17)
APTT BLD: 23.7 SEC — LOW (ref 27.5–35.5)
AST SERPL-CCNC: 63 U/L — HIGH (ref 10–40)
BASOPHILS # BLD AUTO: 0.01 K/UL — SIGNIFICANT CHANGE UP (ref 0–0.2)
BASOPHILS NFR BLD AUTO: 0.1 % — SIGNIFICANT CHANGE UP (ref 0–2)
BILIRUB SERPL-MCNC: 0.7 MG/DL — SIGNIFICANT CHANGE UP (ref 0.2–1.2)
BUN SERPL-MCNC: 8 MG/DL — SIGNIFICANT CHANGE UP (ref 7–23)
CALCIUM SERPL-MCNC: 9.2 MG/DL — SIGNIFICANT CHANGE UP (ref 8.4–10.5)
CHLORIDE SERPL-SCNC: 99 MMOL/L — SIGNIFICANT CHANGE UP (ref 96–108)
CO2 SERPL-SCNC: 22 MMOL/L — SIGNIFICANT CHANGE UP (ref 22–31)
CREAT SERPL-MCNC: 0.83 MG/DL — SIGNIFICANT CHANGE UP (ref 0.5–1.3)
EOSINOPHIL # BLD AUTO: 0.05 K/UL — SIGNIFICANT CHANGE UP (ref 0–0.5)
EOSINOPHIL NFR BLD AUTO: 0.5 % — SIGNIFICANT CHANGE UP (ref 0–6)
GLUCOSE SERPL-MCNC: 97 MG/DL — SIGNIFICANT CHANGE UP (ref 70–99)
HCT VFR BLD CALC: 42.5 % — SIGNIFICANT CHANGE UP (ref 39–50)
HGB BLD-MCNC: 15.1 G/DL — SIGNIFICANT CHANGE UP (ref 13–17)
IMM GRANULOCYTES NFR BLD AUTO: 0.3 % — SIGNIFICANT CHANGE UP (ref 0–1.5)
INR BLD: 0.96 — SIGNIFICANT CHANGE UP (ref 0.88–1.16)
LYMPHOCYTES # BLD AUTO: 0.88 K/UL — LOW (ref 1–3.3)
LYMPHOCYTES # BLD AUTO: 9 % — LOW (ref 13–44)
MCHC RBC-ENTMCNC: 33.3 PG — SIGNIFICANT CHANGE UP (ref 27–34)
MCHC RBC-ENTMCNC: 35.5 GM/DL — SIGNIFICANT CHANGE UP (ref 32–36)
MCV RBC AUTO: 93.6 FL — SIGNIFICANT CHANGE UP (ref 80–100)
MONOCYTES # BLD AUTO: 0.76 K/UL — SIGNIFICANT CHANGE UP (ref 0–0.9)
MONOCYTES NFR BLD AUTO: 7.7 % — SIGNIFICANT CHANGE UP (ref 2–14)
NEUTROPHILS # BLD AUTO: 8.08 K/UL — HIGH (ref 1.8–7.4)
NEUTROPHILS NFR BLD AUTO: 82.4 % — HIGH (ref 43–77)
NRBC # BLD: 0 /100 WBCS — SIGNIFICANT CHANGE UP (ref 0–0)
PLATELET # BLD AUTO: 167 K/UL — SIGNIFICANT CHANGE UP (ref 150–400)
POTASSIUM SERPL-MCNC: 4.2 MMOL/L — SIGNIFICANT CHANGE UP (ref 3.5–5.3)
POTASSIUM SERPL-SCNC: 4.2 MMOL/L — SIGNIFICANT CHANGE UP (ref 3.5–5.3)
PROT SERPL-MCNC: 6.6 G/DL — SIGNIFICANT CHANGE UP (ref 6–8.3)
PROTHROM AB SERPL-ACNC: 11.5 SEC — SIGNIFICANT CHANGE UP (ref 10.6–13.6)
RBC # BLD: 4.54 M/UL — SIGNIFICANT CHANGE UP (ref 4.2–5.8)
RBC # FLD: 12.3 % — SIGNIFICANT CHANGE UP (ref 10.3–14.5)
SODIUM SERPL-SCNC: 134 MMOL/L — LOW (ref 135–145)
WBC # BLD: 9.81 K/UL — SIGNIFICANT CHANGE UP (ref 3.8–10.5)
WBC # FLD AUTO: 9.81 K/UL — SIGNIFICANT CHANGE UP (ref 3.8–10.5)

## 2021-07-11 PROCEDURE — 99285 EMERGENCY DEPT VISIT HI MDM: CPT

## 2021-07-11 PROCEDURE — 85025 COMPLETE CBC W/AUTO DIFF WBC: CPT

## 2021-07-11 PROCEDURE — 97116 GAIT TRAINING THERAPY: CPT

## 2021-07-11 PROCEDURE — 73560 X-RAY EXAM OF KNEE 1 OR 2: CPT

## 2021-07-11 PROCEDURE — 86850 RBC ANTIBODY SCREEN: CPT

## 2021-07-11 PROCEDURE — 85730 THROMBOPLASTIN TIME PARTIAL: CPT

## 2021-07-11 PROCEDURE — 71045 X-RAY EXAM CHEST 1 VIEW: CPT

## 2021-07-11 PROCEDURE — 36415 COLL VENOUS BLD VENIPUNCTURE: CPT

## 2021-07-11 PROCEDURE — 85027 COMPLETE CBC AUTOMATED: CPT

## 2021-07-11 PROCEDURE — 81003 URINALYSIS AUTO W/O SCOPE: CPT

## 2021-07-11 PROCEDURE — 93005 ELECTROCARDIOGRAM TRACING: CPT

## 2021-07-11 PROCEDURE — 96375 TX/PRO/DX INJ NEW DRUG ADDON: CPT | Mod: XU

## 2021-07-11 PROCEDURE — 86900 BLOOD TYPING SEROLOGIC ABO: CPT

## 2021-07-11 PROCEDURE — 85610 PROTHROMBIN TIME: CPT

## 2021-07-11 PROCEDURE — 97161 PT EVAL LOW COMPLEX 20 MIN: CPT

## 2021-07-11 PROCEDURE — 97530 THERAPEUTIC ACTIVITIES: CPT

## 2021-07-11 PROCEDURE — 71260 CT THORAX DX C+: CPT

## 2021-07-11 PROCEDURE — 87633 RESP VIRUS 12-25 TARGETS: CPT

## 2021-07-11 PROCEDURE — 74177 CT ABD & PELVIS W/CONTRAST: CPT | Mod: 26,MG

## 2021-07-11 PROCEDURE — 76000 FLUOROSCOPY <1 HR PHYS/QHP: CPT

## 2021-07-11 PROCEDURE — 96374 THER/PROPH/DIAG INJ IV PUSH: CPT | Mod: XU

## 2021-07-11 PROCEDURE — 87641 MR-STAPH DNA AMP PROBE: CPT

## 2021-07-11 PROCEDURE — 99285 EMERGENCY DEPT VISIT HI MDM: CPT | Mod: 25

## 2021-07-11 PROCEDURE — 80053 COMPREHEN METABOLIC PANEL: CPT

## 2021-07-11 PROCEDURE — 71260 CT THORAX DX C+: CPT | Mod: 26,MG

## 2021-07-11 PROCEDURE — 86901 BLOOD TYPING SEROLOGIC RH(D): CPT

## 2021-07-11 PROCEDURE — G1004: CPT

## 2021-07-11 PROCEDURE — C1713: CPT

## 2021-07-11 PROCEDURE — 74177 CT ABD & PELVIS W/CONTRAST: CPT

## 2021-07-11 PROCEDURE — 73700 CT LOWER EXTREMITY W/O DYE: CPT

## 2021-07-11 PROCEDURE — 73110 X-RAY EXAM OF WRIST: CPT

## 2021-07-11 PROCEDURE — 73130 X-RAY EXAM OF HAND: CPT

## 2021-07-11 PROCEDURE — 87798 DETECT AGENT NOS DNA AMP: CPT

## 2021-07-11 PROCEDURE — 87581 M.PNEUMON DNA AMP PROBE: CPT

## 2021-07-11 PROCEDURE — 93010 ELECTROCARDIOGRAM REPORT: CPT

## 2021-07-11 PROCEDURE — 87486 CHLMYD PNEUM DNA AMP PROBE: CPT

## 2021-07-11 PROCEDURE — 80048 BASIC METABOLIC PNL TOTAL CA: CPT

## 2021-07-11 RX ORDER — AMLODIPINE BESYLATE 2.5 MG/1
1 TABLET ORAL
Qty: 0 | Refills: 0 | DISCHARGE

## 2021-07-11 RX ORDER — MORPHINE SULFATE 50 MG/1
4 CAPSULE, EXTENDED RELEASE ORAL ONCE
Refills: 0 | Status: DISCONTINUED | OUTPATIENT
Start: 2021-07-11 | End: 2021-07-11

## 2021-07-11 RX ORDER — HYDROMORPHONE HYDROCHLORIDE 2 MG/ML
0.5 INJECTION INTRAMUSCULAR; INTRAVENOUS; SUBCUTANEOUS ONCE
Refills: 0 | Status: DISCONTINUED | OUTPATIENT
Start: 2021-07-11 | End: 2021-07-11

## 2021-07-11 RX ADMIN — HYDROMORPHONE HYDROCHLORIDE 0.5 MILLIGRAM(S): 2 INJECTION INTRAMUSCULAR; INTRAVENOUS; SUBCUTANEOUS at 21:15

## 2021-07-11 RX ADMIN — MORPHINE SULFATE 4 MILLIGRAM(S): 50 CAPSULE, EXTENDED RELEASE ORAL at 19:00

## 2021-07-11 NOTE — ED PROVIDER NOTE - CLINICAL SUMMARY MEDICAL DECISION MAKING FREE TEXT BOX
avss. nontoxic. NAD. found to have acute minimally displaced R 4-6th rib fx and displaced 7-8th rib fx w/ assoc R thoracic subcutaneous emphysema/pneumomediastinum and trace R apical ptx on ct c/a/p. no indication for ct head/neck imaging at this time. preop labs/ekg done. [facility] contacted for trauma evaluation. pt will be accepted for ED to ED transfer. pt w/ full capacity. risks/benefits discussed. pt consenting to transfer. questions answered.

## 2021-07-11 NOTE — ED ADULT TRIAGE NOTE - WEIGHT METHOD
stated
=================== OBJECTIVE ===================    VITAL SIGNS: Last 24 Hours  T(C): 36.8 (21 Feb 2020 03:45), Max: 37 (20 Feb 2020 22:52)  T(F): 98.2 (21 Feb 2020 03:45), Max: 98.6 (20 Feb 2020 22:52)  HR: 66 (21 Feb 2020 03:45) (66 - 78)  BP: 116/62 (21 Feb 2020 03:45) (110/60 - 116/62)  BP(mean): --  RR: 20 (21 Feb 2020 03:45) (18 - 20)  SpO2: 97% (21 Feb 2020 03:45) (94% - 97%)    02-20-20 @ 07:01  -  02-21-20 @ 05:23  --------------------------------------------------------  IN: 0 mL / OUT: 300 mL / NET: -300 mL

## 2021-07-11 NOTE — ED PROVIDER NOTE - OBJECTIVE STATEMENT
65M former smoker (40py), htn, hypothyroidism, nephrolithiasis, prior covid19 infection not req hospitalization (5/2020), completed covid19 infection (5/2021), c/o progressively worsening R anterior chest wall pain and limited deep inhalation 2/2 R chest wall tightness/heaviness s/p ground level mechanical fall over uneven sidewalk hitting R chest onto metal park bench before subsequently hitting the ground w/ his buttocks yesterday PM. no head/neck injury, no loc, no n/v, no prodrome, no daily antiplatelet/AC. pt was able to get up on own and ambulate home w/o difficulty. minimally improved s/p total asa 162 (only occasional use, none chronic). no fever/chills, no ha/dizziness, no uri/cough, no sob, no abd pain, no back pain, no extremity pain, no extremity numbness/paresthesia/weakness, no pelvic/hip pain, no etoh-dpt/ivdu.     highly functional at baseline, a+ox3, +ADLs, ambulates unassisted, lives at home.

## 2021-07-11 NOTE — ED PROVIDER NOTE - PHYSICAL EXAMINATION
CONST: overweight nontoxic NAD speaking in full sentences  HEAD: atraumatic  EYES: conjunctivae clear, PERRL, EOMI, no racoon eyes  ENT: mmm, no clear rhinorrhea, no hemotympanum, no murphy sign  NECK: supple/FROM, no midline ttp  CARD: rrr no murmurs  CHEST: +R anteroinferior chest wall deformity/ttp/swelling w/ assoc crepitus extending to neck/R posterior chest, no stridor/retractions/tripoding  ABD: soft, nd, nttp, no rebound/guarding  PELVIS: stable, hips nttp, no pain w/ axial load/log roll  BACK: no midline ttp  EXT: compartments soft, nttp, FROM, symmetric distal pulses intact  SKIN: warm, dry, no rash, no pedal edema/ttp/rash, cap refill <2sec  NEURO: a+ox3, 5/5 strength x4, gross sensation intact x4, baseline gait CONST: overweight nontoxic NAD speaking in full sentences  HEAD: atraumatic  EYES: conjunctivae clear, PERRL, EOMI, no racoon eyes  ENT: mmm, no clear rhinorrhea, no hemotympanum, no murphy sign  NECK: supple/FROM, no midline ttp  CARD: rrr no murmurs  CHEST: +R anteroinferior chest wall deformity/ttp/swelling w/ assoc crepitus extending to neck/R posterior chest, no stridor/retractions/tripoding  ABD: soft, nd, nttp, no rebound/guarding  PELVIS: stable, hips nttp, no pain w/ axial load/log roll  BACK: no midline ttp  EXT: compartments soft, nttp, FROM, symmetric distal pulses intact  SKIN: warm, dry, 4x5cm ecchymoses overlying R chest lateral to nipple line, +transverse linear abrasion overlying R inferior chest pec line, no pedal edema/ttp/rash, cap refill <2sec  NEURO: a+ox3, CN II-XII intact, 5/5 strength x4, gross sensation intact x4, baseline gait

## 2021-07-11 NOTE — ED ADULT NURSE NOTE - OBJECTIVE STATEMENT
Pt states "last night I fell and hit my right side". Pt states he slipped and his back went into a bench. pt noted to have slight depression of left rib cage and unable to assess appropriately due to patient being unable to take a deeper breath. Respirations a swallow and patient is in extreme pain. Pt transferred to [facility] and upgrade to MD for trauma and pain control.

## 2021-07-11 NOTE — ED PROVIDER NOTE - PROGRESS NOTE DETAILS
pt w/ full capacity. pt verbally consenting to contrast prior to cr level. transfer center contacted. per trauma attending, will call back in 30min. cornelio santamaria contacted for trauma evaluation. pt accepted for ED to ED transfer. pt w/ full capacity. pt consents to transfer.

## 2021-07-11 NOTE — ED PROVIDER NOTE - CARE PLAN
Principal Discharge DX:	Pneumomediastinum  Secondary Diagnosis:	Subcutaneous emphysema  Secondary Diagnosis:	Rib fractures  Secondary Diagnosis:	Fall

## 2021-07-11 NOTE — ED PROVIDER NOTE - INTERPRETATION
How Severe Is Your Skin Lesion?: moderate Has Your Skin Lesion Been Treated?: not been treated Is This A New Presentation, Or A Follow-Up?: Skin Lesion normal

## 2021-08-26 NOTE — PHYSICAL EXAM
[de-identified] : RLE: Midline surgical scar centered over the patella is well healed\par Otherwise grossly normal in appearance/alignment\par No appreciable joint effusion or localized areas of swelling\par Full painless knee ROM achieving 0-135 degrees with patellofemoral crepitus toward terminal flexion, likely a combination of soft tissue and bony in nature\par Stable to varus/valgus/Anterior/Posterior drawer\par Non-tender to patella grind\par NVID\par Foot WWP [de-identified] : New R knee XR show stable healed fracture site with some remodeling around prior screw holes which are still visible\par No appreciable effusion\par Appreciable excess scar tissue of suprapatellar pouch

## 2021-08-26 NOTE — ASSESSMENT
[FreeTextEntry1] : 65M who presents approx 8 months s/p MYCHAL following revision ORIF R patella fracture doing well at this time\par -WBAT/ROMAT RLE without restriction\par -Patient interested in restarting jogging at this point -- Encouraged patient to start on flat surfaces and build up strength/stamina but may return to activities as tolerated at this time\par -RTC PRN

## 2021-08-26 NOTE — HISTORY OF PRESENT ILLNESS
[de-identified] : Patient is 65M approx 18 months out from original injury of R patella fracture s/p ORIF c/b hardware failure w revision ORIF now approx 8 months s/p hardware removal. Patient is doing well at this time. He is able to walk without restriction in terms of distance and time, he only has occasional knee pain when he climbs stairs. Additionally, he has some crepitus in the knee towards terminal flexion. He would like to start jogging again and perhaps start skiing again in winter 2021. Otherwise, patient has no complaints and is very happy with his outcome.

## 2021-10-26 NOTE — ED ADULT TRIAGE NOTE - NSWEIGHTCALCTOOLDRUG_GEN_A_CORE
From: Claudy De Luna  To: Karen Rosales  Sent: 10/25/2021 8:29 PM CDT  Subject: Therapy options + vitamin D intake    Hi there!    Would you know of a number or department I can to discuss therapy options with my current insurance plan? I did once before but have since lost the number.    I also take one pill of 25 mg of vitamin D per day.    Thanks!    used

## 2021-12-11 ENCOUNTER — EMERGENCY (EMERGENCY)
Facility: HOSPITAL | Age: 66
LOS: 1 days | Discharge: HOME | End: 2021-12-11
Attending: EMERGENCY MEDICINE | Admitting: EMERGENCY MEDICINE
Payer: MEDICARE

## 2021-12-11 VITALS
HEIGHT: 71 IN | DIASTOLIC BLOOD PRESSURE: 74 MMHG | OXYGEN SATURATION: 94 % | HEART RATE: 109 BPM | SYSTOLIC BLOOD PRESSURE: 133 MMHG | TEMPERATURE: 100 F | WEIGHT: 167.99 LBS | RESPIRATION RATE: 18 BRPM

## 2021-12-11 VITALS
RESPIRATION RATE: 18 BRPM | OXYGEN SATURATION: 95 % | TEMPERATURE: 99 F | DIASTOLIC BLOOD PRESSURE: 80 MMHG | SYSTOLIC BLOOD PRESSURE: 126 MMHG | HEART RATE: 89 BPM

## 2021-12-11 DIAGNOSIS — R51.9 HEADACHE, UNSPECIFIED: ICD-10-CM

## 2021-12-11 DIAGNOSIS — Z90.89 ACQUIRED ABSENCE OF OTHER ORGANS: Chronic | ICD-10-CM

## 2021-12-11 DIAGNOSIS — K11.20 SIALOADENITIS, UNSPECIFIED: ICD-10-CM

## 2021-12-11 DIAGNOSIS — S82.891A OTHER FRACTURE OF RIGHT LOWER LEG, INITIAL ENCOUNTER FOR CLOSED FRACTURE: Chronic | ICD-10-CM

## 2021-12-11 DIAGNOSIS — Z87.891 PERSONAL HISTORY OF NICOTINE DEPENDENCE: ICD-10-CM

## 2021-12-11 DIAGNOSIS — Z87.442 PERSONAL HISTORY OF URINARY CALCULI: ICD-10-CM

## 2021-12-11 DIAGNOSIS — I10 ESSENTIAL (PRIMARY) HYPERTENSION: ICD-10-CM

## 2021-12-11 DIAGNOSIS — M54.2 CERVICALGIA: ICD-10-CM

## 2021-12-11 DIAGNOSIS — E03.9 HYPOTHYROIDISM, UNSPECIFIED: ICD-10-CM

## 2021-12-11 LAB
ANION GAP SERPL CALC-SCNC: 12 MMOL/L — SIGNIFICANT CHANGE UP (ref 5–17)
BASOPHILS # BLD AUTO: 0.04 K/UL — SIGNIFICANT CHANGE UP (ref 0–0.2)
BASOPHILS NFR BLD AUTO: 0.4 % — SIGNIFICANT CHANGE UP (ref 0–2)
BUN SERPL-MCNC: 9 MG/DL — SIGNIFICANT CHANGE UP (ref 7–23)
CALCIUM SERPL-MCNC: 9.2 MG/DL — SIGNIFICANT CHANGE UP (ref 8.4–10.5)
CHLORIDE SERPL-SCNC: 105 MMOL/L — SIGNIFICANT CHANGE UP (ref 96–108)
CO2 SERPL-SCNC: 21 MMOL/L — LOW (ref 22–31)
CREAT SERPL-MCNC: 0.84 MG/DL — SIGNIFICANT CHANGE UP (ref 0.5–1.3)
EOSINOPHIL # BLD AUTO: 0.03 K/UL — SIGNIFICANT CHANGE UP (ref 0–0.5)
EOSINOPHIL NFR BLD AUTO: 0.3 % — SIGNIFICANT CHANGE UP (ref 0–6)
GLUCOSE SERPL-MCNC: 103 MG/DL — HIGH (ref 70–99)
HCT VFR BLD CALC: 46.1 % — SIGNIFICANT CHANGE UP (ref 39–50)
HGB BLD-MCNC: 16.2 G/DL — SIGNIFICANT CHANGE UP (ref 13–17)
IMM GRANULOCYTES NFR BLD AUTO: 0.3 % — SIGNIFICANT CHANGE UP (ref 0–1.5)
LYMPHOCYTES # BLD AUTO: 0.84 K/UL — LOW (ref 1–3.3)
LYMPHOCYTES # BLD AUTO: 7.4 % — LOW (ref 13–44)
MCHC RBC-ENTMCNC: 32.4 PG — SIGNIFICANT CHANGE UP (ref 27–34)
MCHC RBC-ENTMCNC: 35.1 GM/DL — SIGNIFICANT CHANGE UP (ref 32–36)
MCV RBC AUTO: 92.2 FL — SIGNIFICANT CHANGE UP (ref 80–100)
MONOCYTES # BLD AUTO: 0.75 K/UL — SIGNIFICANT CHANGE UP (ref 0–0.9)
MONOCYTES NFR BLD AUTO: 6.6 % — SIGNIFICANT CHANGE UP (ref 2–14)
NEUTROPHILS # BLD AUTO: 9.67 K/UL — HIGH (ref 1.8–7.4)
NEUTROPHILS NFR BLD AUTO: 85 % — HIGH (ref 43–77)
NRBC # BLD: 0 /100 WBCS — SIGNIFICANT CHANGE UP (ref 0–0)
PLATELET # BLD AUTO: 172 K/UL — SIGNIFICANT CHANGE UP (ref 150–400)
POTASSIUM SERPL-MCNC: 3.9 MMOL/L — SIGNIFICANT CHANGE UP (ref 3.5–5.3)
POTASSIUM SERPL-SCNC: 3.9 MMOL/L — SIGNIFICANT CHANGE UP (ref 3.5–5.3)
RBC # BLD: 5 M/UL — SIGNIFICANT CHANGE UP (ref 4.2–5.8)
RBC # FLD: 13 % — SIGNIFICANT CHANGE UP (ref 10.3–14.5)
SODIUM SERPL-SCNC: 138 MMOL/L — SIGNIFICANT CHANGE UP (ref 135–145)
WBC # BLD: 11.36 K/UL — HIGH (ref 3.8–10.5)
WBC # FLD AUTO: 11.36 K/UL — HIGH (ref 3.8–10.5)

## 2021-12-11 PROCEDURE — 85025 COMPLETE CBC W/AUTO DIFF WBC: CPT

## 2021-12-11 PROCEDURE — 70491 CT SOFT TISSUE NECK W/DYE: CPT | Mod: 26,MA

## 2021-12-11 PROCEDURE — 80048 BASIC METABOLIC PNL TOTAL CA: CPT

## 2021-12-11 PROCEDURE — 70450 CT HEAD/BRAIN W/O DYE: CPT | Mod: 26,MA

## 2021-12-11 PROCEDURE — 99284 EMERGENCY DEPT VISIT MOD MDM: CPT | Mod: 25

## 2021-12-11 PROCEDURE — 70450 CT HEAD/BRAIN W/O DYE: CPT | Mod: MA

## 2021-12-11 PROCEDURE — 99284 EMERGENCY DEPT VISIT MOD MDM: CPT

## 2021-12-11 PROCEDURE — 36415 COLL VENOUS BLD VENIPUNCTURE: CPT

## 2021-12-11 PROCEDURE — 96374 THER/PROPH/DIAG INJ IV PUSH: CPT | Mod: XU

## 2021-12-11 PROCEDURE — 70491 CT SOFT TISSUE NECK W/DYE: CPT | Mod: MA

## 2021-12-11 RX ORDER — OXYCODONE AND ACETAMINOPHEN 5; 325 MG/1; MG/1
1 TABLET ORAL ONCE
Refills: 0 | Status: DISCONTINUED | OUTPATIENT
Start: 2021-12-11 | End: 2021-12-11

## 2021-12-11 RX ORDER — DICLOFENAC SODIUM 75 MG/1
1 TABLET, DELAYED RELEASE ORAL
Qty: 30 | Refills: 0
Start: 2021-12-11

## 2021-12-11 RX ORDER — KETOROLAC TROMETHAMINE 30 MG/ML
15 SYRINGE (ML) INJECTION ONCE
Refills: 0 | Status: DISCONTINUED | OUTPATIENT
Start: 2021-12-11 | End: 2021-12-11

## 2021-12-11 RX ORDER — SODIUM CHLORIDE 9 MG/ML
1000 INJECTION INTRAMUSCULAR; INTRAVENOUS; SUBCUTANEOUS ONCE
Refills: 0 | Status: COMPLETED | OUTPATIENT
Start: 2021-12-11 | End: 2021-12-11

## 2021-12-11 RX ADMIN — OXYCODONE AND ACETAMINOPHEN 1 TABLET(S): 5; 325 TABLET ORAL at 14:01

## 2021-12-11 RX ADMIN — Medication 15 MILLIGRAM(S): at 16:18

## 2021-12-11 RX ADMIN — SODIUM CHLORIDE 1000 MILLILITER(S): 9 INJECTION INTRAMUSCULAR; INTRAVENOUS; SUBCUTANEOUS at 14:01

## 2021-12-11 NOTE — ED ADULT NURSE NOTE - CHPI ED NUR SYMPTOMS NEG
no body aches/no chest pain/no chills/no cough/no diaphoresis/no edema/no fever/no hemoptysis/no shortness of breath/no wheezing

## 2021-12-11 NOTE — ED PROVIDER NOTE - PROGRESS NOTE DETAILS
Labs w mildly elevated wbc, o/w wnl.  CT head neg.  CT neck w parotitis, no apparent stone.  Will dc w abx, sialogogues, ent fu.

## 2021-12-11 NOTE — ED PROVIDER NOTE - ENMT, MLM
extensive past psych history, unsure of home meds, hx of suicide attempt.  -psych following appreciate recs-->will resume PO lexapro 20 mg, abilify 10 mg  -monitor qtc, daily ekg  - 1 to 1 Airway patent, Nasal mucosa clear. Mouth with normal mucosa. Throat has no vesicles, no oropharyngeal exudates and uvula is midline.

## 2021-12-11 NOTE — ED PROVIDER NOTE - CLINICAL SUMMARY MEDICAL DECISION MAKING FREE TEXT BOX
Pt c/o head/neck pain, swelling R neck near jaw similar to sx w prior hsv.  No rash on exam but ? early shingles sx.  No dental abnl.  + swelling post to jaw - ? lad vs submandibular/parotid gland issue.  Plan labs, pain meds, ct head/neck; reassess.

## 2021-12-11 NOTE — ED PROVIDER NOTE - SKIN, MLM
Skin normal color for race, warm, dry and intact. No evidence of rash. Mild tenderness to touch but no apperaint rash Skin normal color for race, warm, dry and intact. Mild tenderness to touch but no apparent rash.

## 2021-12-11 NOTE — ED ADULT NURSE NOTE - NSICDXPASTSURGICALHX_GEN_ALL_CORE_FT
PAST SURGICAL HISTORY:  Ankle fracture, right 5 yrs ago tx @[facility] with plates and sharron    S/P tonsillectomy

## 2021-12-11 NOTE — ED ADULT NURSE NOTE - OBJECTIVE STATEMENT
Pt presents to ED with c/o right sided neck pain with swelling, headache and chills since yesterday. Pt states has h/o shingles to right neck in the past. Denies CP, SOB, N/V/D. Pt speak with full sentences with even and nonlabored breathing. Tenderness to R neck. No rashes noted.

## 2021-12-11 NOTE — ED PROVIDER NOTE - OBJECTIVE STATEMENT
65 y/o M with a PMHx of hypothyroidism, HTN, and prior shingles infection. Pt presents to the ED with a cc of pain on face and side of head down to neck as well as swelling behind right jaw. Pt states this is similar to prior shingles episode. Pt denies having any rash, fever, headache, change in vision, eye pain, eye lesions, change in speech. Pt does complain of having generalized weakness but no numbness. He states taking an Asprin x2 this morning but had no relief to symptoms. 65 y/o M with a PMHx of hypothyroidism, HTN, and prior shingles infection. Pt presents to the ED with a cc of pain on face and side of head down to neck as well as swelling behind right jaw. Pt states this is similar to prior shingles episode. Pt denies having any rash, fever, change in vision, eye pain, eye lesions, change in speech. Pt does complain of having ha on R side of head, generalized weakness but no numbness. He states taking an Asprin x2 this morning but had no relief to symptoms.  No uri sx, dental pain.

## 2021-12-11 NOTE — ED PROVIDER NOTE - NSICDXPASTSURGICALHX_GEN_ALL_CORE_FT
PAST SURGICAL HISTORY:  Ankle fracture, right 5 yrs ago tx @[facility] with plates and sharron    S/P tonsillectomy

## 2021-12-11 NOTE — ED PROVIDER NOTE - MUSCULOSKELETAL, MLM
Spine appears normal, range of motion is not limited, no muscle or joint tenderness Spine appears normal, range of motion is not limited, Tender right scalp and temporal paraitale area as well as back of his neck has tender swelling to right submandibular area.

## 2021-12-11 NOTE — ED PROVIDER NOTE - NSFOLLOWUPINSTRUCTIONS_ED_ALL_ED_FT
Parotitis    Take augmentin twice a day for 1 week.    Take diclofenac every 8 hours with food as needed for pain.  Add tylenol for additional pain relief as needed - use as directed.     Use sour candies or other things to help you produce a lot of saliva.      Follow up with an ENT for further care and your pmd.    You may call our referrals coordinator at [phone] Monday to Friday 11am-7pm for assistance with making an appointment  ---    Parotitis       Parotitis is inflammation of one or both of your parotid glands. These glands produce saliva. They are found on each side of your face, below and in front of your earlobes. The saliva that they produce comes out of tiny openings (ducts) inside your cheeks.    Parotitis may cause sudden swelling and pain (acute parotitis). It can also cause repeated episodes of swelling and pain or continued swelling that may or may not be painful (chronic parotitis).      What are the causes?  This condition may be caused by:  •Infections from bacteria.      •Infections from viruses, such as mumps or HIV.      •Blockage (obstruction) of saliva flow through the parotid glands. This can be from a stone, scar tissue, or a tumor.      •Diseases that cause your body's defense system (immune system) to attack healthy cells in your salivary glands. These are called autoimmune diseases.        What increases the risk?  You are more likely to develop this condition if:  •You are 50 years old or older.      •You do not drink enough fluids (are dehydrated).      •You drink too much alcohol.    •You have:  •A dry mouth.      •Poor dental hygiene.      •Diabetes.      •Gout.      •A long-term illness.        •You have had radiation treatments to the head and neck.      •You take certain medicines.        What are the signs or symptoms?  Symptoms of this condition depend on the cause. Symptoms may include:  •Swelling under and in front of the ear. This may get worse after eating.      •Redness of the skin over the parotid gland.      •Pain and tenderness over the parotid gland. This may get worse after eating.      •Fever or chills.      •Pus coming from the ducts inside the mouth.      •Dry mouth.      •A bad taste in the mouth.        How is this diagnosed?  This condition may be diagnosed based on:  •Your medical history.      •A physical exam.    •Tests to find the cause of the parotitis. These may include:  •Doing blood tests to check for an autoimmune disease or infections from a virus.      •Taking a fluid sample from the parotid gland and testing it for infection.      •Injecting the ducts of the parotid gland with a dye and then taking X-rays (sialogram).      •Having other imaging tests of the gland, such as X-rays, ultrasound, MRI, or CT scan.      •Checking the opening of the gland for a stone or obstruction.      •Placing a needle into the gland to remove tissue for a biopsy (fine needle aspiration).          How is this treated?  Treatment for this condition depends on the cause. Treatment may include:  •Antibiotic medicine for a bacterial infection.      •Drinking more fluids.      •Removing a stone or obstruction.      •Treating an underlying disease that is causing parotitis.      •Surgery to drain an infection, remove a growth, or remove the whole gland (parotidectomy).      Treatment may not be needed if parotid swelling goes away with home care.      Follow these instructions at home:      Medicines      •Take over-the-counter and prescription medicines only as told by your health care provider.      •If you were prescribed an antibiotic medicine, take it as told by your health care provider. Do not stop taking the antibiotic even if you start to feel better.      Managing pain and swelling   •If directed, apply heat to the affected area as often as told by your health care provider. Use the heat source that your health care provider recommends, such as a moist heat pack or a heating pad. To apply the heat:  •Place a towel between your skin and the heat source.      •Leave the heat on for 20–30 minutes.      •Remove the heat if your skin turns bright red. This is especially important if you are unable to feel pain, heat, or cold. You may have a greater risk of getting burned.        •Gargle with a salt-water mixture 3–4 times a day or as needed. To make a salt-water mixture, completely dissolve ½–1 tsp (3–6 g) of salt in 1 cup (237 mL) of warm water.      •Gently massage the parotid glands as told by your health care provider.        General instructions      •Drink enough fluid to keep your urine pale yellow.      •Keep your mouth clean and moist.      •Try sucking on sour candy. This may help to make your mouth less dry by stimulating the flow of saliva.    •Maintain good oral health.  •Brush your teeth at least two times a day.      •Floss your teeth every day.      •See your dentist regularly.        • Do not use any products that contain nicotine or tobacco, such as cigarettes, e-cigarettes, and chewing tobacco. If you need help quitting, ask your health care provider.      • Do not drink alcohol.      •Keep all follow-up visits as told by your health care provider. This is important.        Contact a health care provider if:    •You have a fever or chills.      •You have new symptoms.      •Your symptoms get worse.      •Your symptoms do not improve with treatment.        Get help right away if:    •You have difficulty breathing or swallowing because of the swollen gland.        Summary    •Parotitis is inflammation of one or both of your parotid glands.      •Symptoms include pain and swelling under and in front of the ear. They may also include a fever and a bad taste in your mouth.      •This condition may be treated with antibiotics, increasing fluids, or surgery.      •In some cases, parotitis may go away on its own without treatment.      •You should drink plenty of fluids, maintain good oral hygiene, and avoid tobacco products

## 2021-12-11 NOTE — ED PROVIDER NOTE - PATIENT PORTAL LINK FT
You can access the FollowMyHealth Patient Portal offered by [facility] by registering at the following website: [email or web address]. By joining [employer]’s FollowMyHealth portal, you will also be able to view your health information using other applications (apps) compatible with our system.

## 2021-12-14 ENCOUNTER — APPOINTMENT (OUTPATIENT)
Dept: OTOLARYNGOLOGY | Facility: CLINIC | Age: 66
End: 2021-12-14
Payer: MEDICARE

## 2021-12-14 DIAGNOSIS — K11.20 SIALOADENITIS, UNSPECIFIED: ICD-10-CM

## 2021-12-14 DIAGNOSIS — R60.0 LOCALIZED EDEMA: ICD-10-CM

## 2021-12-14 PROCEDURE — 99203 OFFICE O/P NEW LOW 30 MIN: CPT

## 2021-12-14 NOTE — DATA REVIEWED
[de-identified] : EXAM: CT NECK SOFT TISSUE IC\par \par PROCEDURE DATE: 12/11/2021\par \par \par \par INTERPRETATION: CT EXAMINATION OF THE NECK\par \par CLINICAL INDICATION: Right-sided neck and head pain, swelling right neck. History of shingles.\par \par TECHNIQUE: Multidetector reformatted CT images of the neck were obtained following the intravenous administration of 95 mL Isovue-370.\par \par COMPARISON: None.\par \par FINDINGS:\par Aerodigestive structures: No evidence of mass, mucosal irregularity or abnormal enhancement.\par \par Thyroid gland: Normal.\par \par Parotid and submandibular glands: Normal left parotid gland and bilateral submandibular glands.\par \par There is subtle asymmetric prominence of the right parotid gland with inflammatory stranding in the adjacent subcutaneous soft tissues and associated skin thickening, findings may represent early parotis in the proper clinical setting. There is no evidence of sialolith or ductal dilatation. There is no evidence of fluid collection to suggest soft tissue abscess.\par \par Lymph nodes: There are multiple borderline enlarged lymph nodes in the right-sided cervical barbara stations, favored to be reactive in nature.\par \par Vascular structures: There is atherosclerotic plaque at the bilateral carotid bulbs and scattered atherosclerotic calcifications along the visualized portion of the aortic arch..\par \par Osseous structures: No no evidence of acute fracture, dislocation or destructive lesion.\par \par There are chronic fractures of the right fourth and fifth ribs.\par There are multilevel degenerative changes of the cervical spine.\par \par Paranasal sinuses: Clear.\par \par Mastoid air cells: Clear.\par \par Partially visualized intracranial structures: Normal.\par \par Orbits: The globes, retrobulbar fat, extraocular muscles, and optic nerve sheath complexes are intact and normal in morphology.\par \par Partially visualized lung apices: Normal.\par \par \par IMPRESSION:\par \par Subtle asymmetric prominence of the right parotid gland with adjacent soft tissue inflammatory changes, findings may represent early parotitis in the proper clinical setting.\par \par Multiple borderline enlarged lymph nodes in the right-sided cervical barbara stations.\par \par Correlation with laboratory values is advised.

## 2021-12-14 NOTE — ASSESSMENT
[FreeTextEntry1] : 66-year-old gentleman who presents with right-sided facial pain. Based on history, physical exam and previous imaging this appears to be consistent with right-sided parotid sialadenitis.  The patient has done well with conservative treatment and symptoms have now been resolved for 3 days. Exam today is normal. I reviewed my sialadenitis protocol to avoid this from happening in the future. The patient to follow up as needed.\par \par - Sialoadenitis protocol

## 2021-12-14 NOTE — HISTORY OF PRESENT ILLNESS
[de-identified] : 67 yo male who presents from right sided facial swelling.  These symptoms started this past Friday night and by saturday morning he had swelling, along with pain, jaw, behind ear and up the scalp.  No issues making saliva.  No changes in hearing at that time.  No drainage or otorrhea.  \par \par In the ED was sialadenitis.  He was treated with ampicillin.  No steroids.  He was given an NSAID for discomfort.  He was instructed to drink water and try lemon candies.  He started to feel better after 4 days.  Swelling and tenderness had diminished.  the patient was advised to follow up with ENT. No ENT issues otherwise.

## 2022-06-13 ENCOUNTER — NON-APPOINTMENT (OUTPATIENT)
Age: 67
End: 2022-06-13

## 2022-06-13 VITALS — WEIGHT: 175 LBS | HEIGHT: 72 IN | BODY MASS INDEX: 23.7 KG/M2

## 2022-06-13 NOTE — DATA REVIEWED
[Lung Cancer Screening] : Patient underwent lung cancer screening [2] : 2 [S] : S [1] : 1 [TextBox_12] : 01/2017 [TextBox_42] : 03/2021

## 2022-06-13 NOTE — HISTORY OF PRESENT ILLNESS
[_____ pack-years] : [unfilled] pack-years [TextBox_13] : Referred by Dr. Onofre, looking for new PCP\par \par Mr. PATY VIVAS is a 66 year old man with a history of HTN, tinnitus\par \par Initial LDCT 17 Lung Rads 2S\par did not have a cariology evaluation or ultrasound of aorta since report last year\par Annual LDCT 18 Lung Rads 2\par Annual LDCT 2019 LR 2\par Annual LDCT 2020 LR 1\par Annual LDCT 3/2021 LR 1\par Had a scan that showed some calcification of one of his coronary arteries\par \par Over the phone today we reviewed eligibility for, as well as, discussion of Low-Dose CT lung cancer screening program as part of his annual screening. Reviewed and confirmed that the patient meets screening eligibility criteria:\par -Age: 89 year \par Smoking status:\par -It is unclear if patient is a former or current smoker as he will not acknowledge smoking over the phone.\par Prior records indicate the following smoking history below:\par -Number of pack(s) per day: 1\par -Number of year smoked: 51\par -Number of pack years smokin\par \par Mr. VIVAS denies any signs or symptoms of lung cancer including new cough, change in cough, hemoptysis and unintentional weight loss. \par \par Mr. VIVAS denies any personal history of lung cancer. No lung cancer in a 1st degree relative. Denies any history of lung disease. History of possible occupational exposure to asbestos when worked in [email or web address]. \par  [TextBox_8] : 51 [TextBox_6] : 1

## 2022-06-13 NOTE — PLAN
[Smoking Cessation] : smoking cessation [FreeTextEntry1] : Plan:\par -Low Dose CT chest for lung cancer screening\par -Follow up with patient and his referring provider after his LDCT results have been reviewed by the multi-disciplinary clinical team\par -Encouraged smoking cessation if he is currently smoking\par \par \par Engaged in shared decision making with Mr. APTY VIVAS . Answered all questions. He verbalized understanding and agreement. He knows to call back with any questions or concerns

## 2022-06-24 ENCOUNTER — APPOINTMENT (OUTPATIENT)
Dept: CT IMAGING | Facility: HOSPITAL | Age: 67
End: 2022-06-24

## 2022-06-24 ENCOUNTER — OUTPATIENT (OUTPATIENT)
Dept: OUTPATIENT SERVICES | Facility: HOSPITAL | Age: 67
LOS: 1 days | End: 2022-06-24
Payer: MEDICARE

## 2022-06-24 DIAGNOSIS — Z90.89 ACQUIRED ABSENCE OF OTHER ORGANS: Chronic | ICD-10-CM

## 2022-06-24 DIAGNOSIS — S82.891A OTHER FRACTURE OF RIGHT LOWER LEG, INITIAL ENCOUNTER FOR CLOSED FRACTURE: Chronic | ICD-10-CM

## 2022-06-24 PROCEDURE — 71271 CT THORAX LUNG CANCER SCR C-: CPT

## 2022-06-24 PROCEDURE — 71271 CT THORAX LUNG CANCER SCR C-: CPT | Mod: 26

## 2022-06-27 ENCOUNTER — NON-APPOINTMENT (OUTPATIENT)
Age: 67
End: 2022-06-27

## 2022-09-08 NOTE — PATIENT PROFILE ADULT - BRADEN SCORE
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
20

## 2023-01-27 NOTE — PATIENT PROFILE ADULT - NSPROMEDSPUMP_GEN_A_NUR
Depressor Anguli Oris Units: 1 Show Glabellar Units: Yes Right Pupillary Line Units: 0 Dilution (U/0.1 Cc): 4 Show Right And Left Pupillary Line Units: No Reconstitution Date (Optional): 1/27/2023 Expiration Date (Month Year): 6/30/2023 Additional Comments: Dysport f/u no charge Consent: Written consent obtained. Risks include but not limited to lid/brow ptosis, bruising, swelling, diplopia, temporary effect, incomplete chemical denervation. Post-Care Instructions: Patient instructed to not lie down for 4 hours and limit physical activity for 24 hours. Detail Level: Detailed none

## 2023-05-24 ENCOUNTER — APPOINTMENT (OUTPATIENT)
Dept: ORTHOPEDIC SURGERY | Facility: CLINIC | Age: 68
End: 2023-05-24

## 2023-05-24 ENCOUNTER — APPOINTMENT (OUTPATIENT)
Dept: ORTHOPEDIC SURGERY | Facility: CLINIC | Age: 68
End: 2023-05-24
Payer: MEDICARE

## 2023-05-24 VITALS
HEART RATE: 83 BPM | OXYGEN SATURATION: 97 % | BODY MASS INDEX: 23.03 KG/M2 | DIASTOLIC BLOOD PRESSURE: 84 MMHG | SYSTOLIC BLOOD PRESSURE: 128 MMHG | WEIGHT: 170 LBS | HEIGHT: 72 IN

## 2023-05-24 PROCEDURE — 72083 X-RAY EXAM ENTIRE SPI 4/5 VW: CPT

## 2023-05-24 PROCEDURE — 99204 OFFICE O/P NEW MOD 45 MIN: CPT

## 2023-05-31 ENCOUNTER — APPOINTMENT (OUTPATIENT)
Dept: ORTHOPEDIC SURGERY | Facility: CLINIC | Age: 68
End: 2023-05-31

## 2023-06-13 ENCOUNTER — APPOINTMENT (OUTPATIENT)
Dept: ORTHOPEDIC SURGERY | Facility: CLINIC | Age: 68
End: 2023-06-13
Payer: MEDICARE

## 2023-06-13 PROCEDURE — 99214 OFFICE O/P EST MOD 30 MIN: CPT

## 2023-06-16 NOTE — HISTORY OF PRESENT ILLNESS
[de-identified] : Initial visit 05/24/2023: Mr. Palma is here for spinal consultation. He reports that he has had ongoing chronic back pain However, starting on Saturday of this past weekend, he developed severe pain from his back going down his left lower extremity to his toes. His left lower extremity has been spasms since then. His left foot has been numb. He was seen by a physician who put him on Percocet. However that does not significantly relieve his pain. He is unable to ambulate significantly due to pain in his leg that starts as soon as he stands up to walk. He is completely disabled by his pain syndrome.

## 2023-06-16 NOTE — END OF VISIT
[FreeTextEntry3] : All medical record entries made by the Scribe were at my, Dr. Gage Pineda, direction and personally dictated by me on 06/13/2023. I have reviewed the chart and agree that the record accurately reflects my personal performance of the history, physical exam, assessment and plan. I have also personally directed, reviewed, and agreed with the chart.

## 2023-06-16 NOTE — ADDENDUM
[FreeTextEntry1] : Documented by Alejandra Sinclair acting as a scribe for Dr. Gage Pineda on 05/31/2023.

## 2023-06-16 NOTE — DISCUSSION/SUMMARY
[de-identified] : Diagnosis: Severe left lower extremity radiculopathy with associated weakness and numbness due to left L5/S1 large disc herniation.\par \par I discussed my findings with the patient. We discussed surgical and nonsurgical options. Given his weakness, surgical option was offered. The patient would like to trial nonsurgical care and wishes to avoid surgery at this time. He was given a prescription for a steroid taper, and he will follow up with me in 1 week for a clinical check. We discussed if his clinical syndrome worsens, he should come to the emergency room immediately.

## 2023-06-16 NOTE — PHYSICAL EXAM
[de-identified] : Physical Exam:\par \par General: patient is well developed, well nourished, in no acute\par distress, alert and oriented x 3.\par \par Mood and affect: normal\par \par Respiratory: no respiratory distress noted\par \par Skin: no scars over spine, skin intact, no erythema, increased warmth\par \par Alignment: The spine is well compensated in the coronal and sagittal plane.\par \par Gait: Antalgic left, Unable to toe walk on left\par \par Palpation: no tenderness to palpation spine or paraspinal region\par \par Range of motion: Lumbar spine ROM is restricted.\par \par Neurologic Exam:\par Motor: 3 out of 5 left gastroc. Otherwise, Manual Muscle testing in the lower extremities is 5 out of 5 in all muscle groups. There is no evidence of muscular\par atrophy in the lower extremities. Sensory: Sensation to light touch is grossly intact in the lower extremities\par \par Reflexes: DTR are present and symmetric throughout, no clonus, plantar responses are flexor\par \par Hip Exam: No pain with internal or external rotation of hips bilaterally\par \par Special tests: Straight leg raise test positive on the left. Unable to do single heel raise on the left. Cross straight leg test negative. ANGEL test negative\par \par Vascular: Examination of the peripheral vascular system demonstrates no evidence of congestion or edema. no evidence of\par lymphedema bilateral lower extremities, pulses are present and symmetric in both lower extremities. [de-identified] : MRI Lumbar Spine noncontrast 05/23/2023: L5/S1 large L5/S1 disc herniation left paracentral with inferior extrusion with significant compression of the left S1 nerve root.\par \par XR Full Spine AP/Lateral with Lumbar flexion/extension 05/24/2023 [my read]: Mild disc height loss L5/S1 with retrolisthesis seen on extension. Overall alignment is well maintained.

## 2023-06-16 NOTE — PHYSICAL EXAM
[de-identified] : Physical Exam:\par \par General: patient is well developed, well nourished, in no acute\par distress, alert and oriented x 3.\par \par Mood and affect: normal\par \par Respiratory: no respiratory distress noted\par \par Skin: no scars over spine, skin intact, no erythema, increased warmth\par \par Alignment: The spine is well compensated in the coronal and sagittal plane.\par \par Gait: Antalgic left, Unable to toe walk on left\par \par Palpation: no tenderness to palpation spine or paraspinal region\par \par Range of motion: Lumbar spine ROM is restricted.\par \par Neurologic Exam:\par Motor: 3 out of 5 left gastroc. Otherwise, Manual Muscle testing in the lower extremities is 5 out of 5 in all muscle groups. There is no evidence of muscular\par atrophy in the lower extremities. Sensory: Sensation to light touch is grossly intact in the lower extremities\par \par Reflexes: DTR are present and symmetric throughout, no clonus, plantar responses are flexor\par \par Hip Exam: No pain with internal or external rotation of hips bilaterally\par \par Special tests: Straight leg raise test positive on the left. Unable to do single heel raise on the left. Cross straight leg test negative. ANGEL test negative\par \par Vascular: Examination of the peripheral vascular system demonstrates no evidence of congestion or edema. no evidence of\par lymphedema bilateral lower extremities, pulses are present and symmetric in both lower extremities. [de-identified] : MRI Lumbar Spine noncontrast 05/23/2023: L5/S1 large L5/S1 disc herniation left paracentral with inferior extrusion with significant compression of the left S1 nerve root.\par \par XR Full Spine AP/Lateral with Lumbar flexion/extension 05/24/2023 [my read]: Mild disc height loss L5/S1 with retrolisthesis seen on extension. Overall alignment is well maintained.

## 2023-06-16 NOTE — DISCUSSION/SUMMARY
[de-identified] : Diagnosis: Severe left lower extremity radiculopathy with associated weakness and numbness due to left L5/S1 large disc herniation.\par \par I discussed my findings with the patient. We discussed surgical and nonsurgical options. Given his weakness, surgical option was again offered and recommended. The patient would like to again continue nonsurgical care and understands that delay in surgery, given his weakness, may lead to a deleterious outcome. The patient will consider his options and will contact our office if he wishes to pursue surgery. If not, he should see me back in approximately 3 to 4 weeks to see how the trajectory of his symptoms are going.

## 2023-06-16 NOTE — ADDENDUM
[FreeTextEntry1] : Documented by Alejandra Sinclair acting as a scribe for Dr. Gage Pineda on 06/14/2023.

## 2023-06-16 NOTE — END OF VISIT
[FreeTextEntry3] : All medical record entries made by the Scribe were at my, Dr. Gage Pineda, direction and personally dictated by me on 05/24/2023. I have reviewed the chart and agree that the record accurately reflects my personal performance of the history, physical exam, assessment and plan. I have also personally directed, reviewed, and agreed with the chart.

## 2023-06-27 ENCOUNTER — INPATIENT (INPATIENT)
Facility: HOSPITAL | Age: 68
LOS: 2 days | Discharge: HOME | DRG: 520 | End: 2023-06-30
Attending: STUDENT IN AN ORGANIZED HEALTH CARE EDUCATION/TRAINING PROGRAM | Admitting: STUDENT IN AN ORGANIZED HEALTH CARE EDUCATION/TRAINING PROGRAM
Payer: MEDICARE

## 2023-06-27 VITALS
DIASTOLIC BLOOD PRESSURE: 88 MMHG | SYSTOLIC BLOOD PRESSURE: 147 MMHG | HEART RATE: 89 BPM | TEMPERATURE: 98 F | RESPIRATION RATE: 60 BRPM | OXYGEN SATURATION: 98 % | WEIGHT: 164.91 LBS | HEIGHT: 72 IN

## 2023-06-27 DIAGNOSIS — Z90.89 ACQUIRED ABSENCE OF OTHER ORGANS: Chronic | ICD-10-CM

## 2023-06-27 DIAGNOSIS — S82.891A OTHER FRACTURE OF RIGHT LOWER LEG, INITIAL ENCOUNTER FOR CLOSED FRACTURE: Chronic | ICD-10-CM

## 2023-06-27 DIAGNOSIS — M54.16 RADICULOPATHY, LUMBAR REGION: ICD-10-CM

## 2023-06-27 DIAGNOSIS — M51.16 INTERVERTEBRAL DISC DISORDERS WITH RADICULOPATHY, LUMBAR REGION: ICD-10-CM

## 2023-06-27 DIAGNOSIS — E03.9 HYPOTHYROIDISM, UNSPECIFIED: ICD-10-CM

## 2023-06-27 DIAGNOSIS — N20.0 CALCULUS OF KIDNEY: ICD-10-CM

## 2023-06-27 DIAGNOSIS — I10 ESSENTIAL (PRIMARY) HYPERTENSION: ICD-10-CM

## 2023-06-27 LAB
ANION GAP SERPL CALC-SCNC: 10 MMOL/L — SIGNIFICANT CHANGE UP (ref 5–17)
APTT BLD: 26.1 SEC — LOW (ref 27.5–35.5)
BASOPHILS # BLD AUTO: 0.03 K/UL — SIGNIFICANT CHANGE UP (ref 0–0.2)
BASOPHILS NFR BLD AUTO: 0.5 % — SIGNIFICANT CHANGE UP (ref 0–2)
BUN SERPL-MCNC: 9 MG/DL — SIGNIFICANT CHANGE UP (ref 7–23)
CALCIUM SERPL-MCNC: 8.9 MG/DL — SIGNIFICANT CHANGE UP (ref 8.4–10.5)
CHLORIDE SERPL-SCNC: 104 MMOL/L — SIGNIFICANT CHANGE UP (ref 96–108)
CO2 SERPL-SCNC: 25 MMOL/L — SIGNIFICANT CHANGE UP (ref 22–31)
CREAT SERPL-MCNC: 0.88 MG/DL — SIGNIFICANT CHANGE UP (ref 0.5–1.3)
EGFR: 95 ML/MIN/1.73M2 — SIGNIFICANT CHANGE UP
EOSINOPHIL # BLD AUTO: 0.21 K/UL — SIGNIFICANT CHANGE UP (ref 0–0.5)
EOSINOPHIL NFR BLD AUTO: 3.3 % — SIGNIFICANT CHANGE UP (ref 0–6)
GLUCOSE SERPL-MCNC: 128 MG/DL — HIGH (ref 70–99)
HCT VFR BLD CALC: 46.7 % — SIGNIFICANT CHANGE UP (ref 39–50)
HGB BLD-MCNC: 15.9 G/DL — SIGNIFICANT CHANGE UP (ref 13–17)
IMM GRANULOCYTES NFR BLD AUTO: 0.2 % — SIGNIFICANT CHANGE UP (ref 0–0.9)
INR BLD: 0.97 — SIGNIFICANT CHANGE UP (ref 0.88–1.16)
LYMPHOCYTES # BLD AUTO: 1.04 K/UL — SIGNIFICANT CHANGE UP (ref 1–3.3)
LYMPHOCYTES # BLD AUTO: 16.2 % — SIGNIFICANT CHANGE UP (ref 13–44)
MCHC RBC-ENTMCNC: 32.3 PG — SIGNIFICANT CHANGE UP (ref 27–34)
MCHC RBC-ENTMCNC: 34 GM/DL — SIGNIFICANT CHANGE UP (ref 32–36)
MCV RBC AUTO: 94.7 FL — SIGNIFICANT CHANGE UP (ref 80–100)
MONOCYTES # BLD AUTO: 0.71 K/UL — SIGNIFICANT CHANGE UP (ref 0–0.9)
MONOCYTES NFR BLD AUTO: 11 % — SIGNIFICANT CHANGE UP (ref 2–14)
NEUTROPHILS # BLD AUTO: 4.43 K/UL — SIGNIFICANT CHANGE UP (ref 1.8–7.4)
NEUTROPHILS NFR BLD AUTO: 68.8 % — SIGNIFICANT CHANGE UP (ref 43–77)
NRBC # BLD: 0 /100 WBCS — SIGNIFICANT CHANGE UP (ref 0–0)
PLATELET # BLD AUTO: 197 K/UL — SIGNIFICANT CHANGE UP (ref 150–400)
POTASSIUM SERPL-MCNC: 3.9 MMOL/L — SIGNIFICANT CHANGE UP (ref 3.5–5.3)
POTASSIUM SERPL-SCNC: 3.9 MMOL/L — SIGNIFICANT CHANGE UP (ref 3.5–5.3)
PROTHROM AB SERPL-ACNC: 11.5 SEC — SIGNIFICANT CHANGE UP (ref 10.5–13.4)
RBC # BLD: 4.93 M/UL — SIGNIFICANT CHANGE UP (ref 4.2–5.8)
RBC # FLD: 14.3 % — SIGNIFICANT CHANGE UP (ref 10.3–14.5)
SODIUM SERPL-SCNC: 139 MMOL/L — SIGNIFICANT CHANGE UP (ref 135–145)
WBC # BLD: 6.43 K/UL — SIGNIFICANT CHANGE UP (ref 3.8–10.5)
WBC # FLD AUTO: 6.43 K/UL — SIGNIFICANT CHANGE UP (ref 3.8–10.5)

## 2023-06-27 PROCEDURE — 99285 EMERGENCY DEPT VISIT HI MDM: CPT

## 2023-06-27 PROCEDURE — 99221 1ST HOSP IP/OBS SF/LOW 40: CPT

## 2023-06-27 RX ORDER — OXYCODONE HYDROCHLORIDE 5 MG/1
10 TABLET ORAL EVERY 4 HOURS
Refills: 0 | Status: DISCONTINUED | OUTPATIENT
Start: 2023-06-27 | End: 2023-06-27

## 2023-06-27 RX ORDER — MORPHINE SULFATE 50 MG/1
4 CAPSULE, EXTENDED RELEASE ORAL ONCE
Refills: 0 | Status: DISCONTINUED | OUTPATIENT
Start: 2023-06-27 | End: 2023-06-27

## 2023-06-27 RX ORDER — AMLODIPINE BESYLATE 2.5 MG/1
10 TABLET ORAL DAILY
Refills: 0 | Status: DISCONTINUED | OUTPATIENT
Start: 2023-06-27 | End: 2023-06-30

## 2023-06-27 RX ORDER — ZOLPIDEM TARTRATE 10 MG/1
5 TABLET ORAL AT BEDTIME
Refills: 0 | Status: DISCONTINUED | OUTPATIENT
Start: 2023-06-27 | End: 2023-06-30

## 2023-06-27 RX ORDER — HYDROMORPHONE HYDROCHLORIDE 2 MG/ML
0.5 INJECTION INTRAMUSCULAR; INTRAVENOUS; SUBCUTANEOUS EVERY 4 HOURS
Refills: 0 | Status: DISCONTINUED | OUTPATIENT
Start: 2023-06-27 | End: 2023-06-30

## 2023-06-27 RX ORDER — DEXAMETHASONE 0.5 MG/5ML
10 ELIXIR ORAL ONCE
Refills: 0 | Status: COMPLETED | OUTPATIENT
Start: 2023-06-27 | End: 2023-06-27

## 2023-06-27 RX ORDER — OXYCODONE HYDROCHLORIDE 5 MG/1
5 TABLET ORAL EVERY 4 HOURS
Refills: 0 | Status: DISCONTINUED | OUTPATIENT
Start: 2023-06-27 | End: 2023-06-27

## 2023-06-27 RX ORDER — HYDROMORPHONE HYDROCHLORIDE 2 MG/ML
0.5 INJECTION INTRAMUSCULAR; INTRAVENOUS; SUBCUTANEOUS EVERY 4 HOURS
Refills: 0 | Status: DISCONTINUED | OUTPATIENT
Start: 2023-06-27 | End: 2023-06-27

## 2023-06-27 RX ORDER — ACETAMINOPHEN 500 MG
650 TABLET ORAL EVERY 6 HOURS
Refills: 0 | Status: DISCONTINUED | OUTPATIENT
Start: 2023-06-27 | End: 2023-06-30

## 2023-06-27 RX ORDER — OXYCODONE HYDROCHLORIDE 5 MG/1
10 TABLET ORAL EVERY 4 HOURS
Refills: 0 | Status: DISCONTINUED | OUTPATIENT
Start: 2023-06-27 | End: 2023-06-30

## 2023-06-27 RX ORDER — LEVOTHYROXINE SODIUM 125 MCG
88 TABLET ORAL DAILY
Refills: 0 | Status: DISCONTINUED | OUTPATIENT
Start: 2023-06-27 | End: 2023-06-30

## 2023-06-27 RX ORDER — TAMSULOSIN HYDROCHLORIDE 0.4 MG/1
0.4 CAPSULE ORAL AT BEDTIME
Refills: 0 | Status: DISCONTINUED | OUTPATIENT
Start: 2023-06-27 | End: 2023-06-30

## 2023-06-27 RX ORDER — SENNA PLUS 8.6 MG/1
2 TABLET ORAL AT BEDTIME
Refills: 0 | Status: DISCONTINUED | OUTPATIENT
Start: 2023-06-27 | End: 2023-06-30

## 2023-06-27 RX ORDER — OXYCODONE HYDROCHLORIDE 5 MG/1
5 TABLET ORAL EVERY 4 HOURS
Refills: 0 | Status: DISCONTINUED | OUTPATIENT
Start: 2023-06-27 | End: 2023-06-30

## 2023-06-27 RX ADMIN — ZOLPIDEM TARTRATE 5 MILLIGRAM(S): 10 TABLET ORAL at 22:22

## 2023-06-27 RX ADMIN — MORPHINE SULFATE 4 MILLIGRAM(S): 50 CAPSULE, EXTENDED RELEASE ORAL at 15:00

## 2023-06-27 RX ADMIN — SENNA PLUS 2 TABLET(S): 8.6 TABLET ORAL at 22:22

## 2023-06-27 RX ADMIN — MORPHINE SULFATE 4 MILLIGRAM(S): 50 CAPSULE, EXTENDED RELEASE ORAL at 14:31

## 2023-06-27 RX ADMIN — OXYCODONE HYDROCHLORIDE 10 MILLIGRAM(S): 5 TABLET ORAL at 23:06

## 2023-06-27 RX ADMIN — Medication 102 MILLIGRAM(S): at 14:32

## 2023-06-27 RX ADMIN — OXYCODONE HYDROCHLORIDE 10 MILLIGRAM(S): 5 TABLET ORAL at 18:43

## 2023-06-27 RX ADMIN — OXYCODONE HYDROCHLORIDE 10 MILLIGRAM(S): 5 TABLET ORAL at 19:43

## 2023-06-27 RX ADMIN — Medication 10 MILLIGRAM(S): at 16:17

## 2023-06-27 NOTE — H&P ADULT - PROBLEM SELECTOR PROBLEM 4
[TextBox_4] : Virginie is here for follow up today. She has a history of bilateral hydronephrosis and bilateral VUR which resolved by 18 months of age. No history of a true UTI, although today she reports UTI-like symptoms since last week. She was initially evaluated by the PCP on 4/1/21 for dysuria, urgency, abdominal discomfort, and low back pain, but no fevers. She was started on a course of Bactrim, which was discontinued as the urine culture was negative. She had a similar occurrence in October last year. She was seen at [facility] of fever and dysuria. She was treated with a course of Cefdinir which resolution of symptoms. However, the urine culture at that time was also negative. At baseline, she voids 4-5x/day. The bladder feels empty after voiding. She does not withhold her urine for long periods of time. No history of constipation.  Kidney stones

## 2023-06-27 NOTE — H&P ADULT - ATTENDING COMMENTS
67 year old male presents with severe back pain radiating to his left lower extremity.  He has numbness in his left leg.  He feels weaker.  He has started to use a cane.  He is otherwise neurologically intact.   He has a lumbar MRI showing a left L5-S1 disc herniation.  We discussed treatment options including surgery. Surgery is a left L5-S1 microdiscectomy. We discussed the risks and benefits of surgery. The risks include anesthesia, blood loss, need for blood transfusion, clots, stroke, myocardial infarct, chronic pain, loss of function, need for reoperation, reherniation, durotomy, infection and damage to neurovascular structures. The patient understands the risks. He asked several great questions all of which were answered. He consents to proceed with surgery.  Pre-Op.  Scheduled for OR on Thursday June 29.

## 2023-06-27 NOTE — ED ADULT NURSE NOTE - OBJECTIVE STATEMENT
68 y/o M with pmhx L5 disc rupture presents to the ED c/o intractable back pain and admission for back surgery. States his home oxycodone and steroids do not help sx so he came here for surgery. Back pain is a 10/10 in severity. States morphine and dilaudid work for pain. On exam, A&Ox4, NAD, on RA. VSS. Sensation in tact. 5/5 BUE strength. PIV placed. Labs sent. 4mg Morphine and 10mg decadron IVP administered.

## 2023-06-27 NOTE — H&P ADULT - HISTORY OF PRESENT ILLNESS
67M with left L5-S1 large disc herniation on MRI from 5/23 known to Dr. Pineda c/o worsening left lower back pain and weakness of the left leg. Pt reports he initially had an injury 30+years ago when he was skiing that resulted in L3-L5 disc herniation and did not need operative intervention at that time and has had no issue since.  He reports at the end of May 2023 he was getting out of bed and felt the initial pain. He said he went for an MRI on 5/23/23 and saw Dr. Pineda on 5/24/23 to discuss the results and the need for surgical intervention. At this time he did not want surgery and was managed conservatively with oral steroids and percocet as needed for pain. He followed up with Edwin Galarza on 6/13 at the clinic for further discussion of surgical intervention and says he needed more time to think about the decision. He was prescribed a Medro dose pack. He says that the steroid had helped the pain but today is having unbearable pain and weakness of his left leg. He is now amendable to surgery.  Reports he ambulates with a cane since the pain/weakness has started. Reports numbness of his left foot and ankle, with decrease sensation of his lower leg below the knee. as well as tingling of the lower left leg.    Denies urinary or fecal retention or incontinence, denies trauma/fall,

## 2023-06-27 NOTE — ED ADULT NURSE NOTE - CAS DISCH BELONGINGS RETURNED
[address] [place] [address]    Name:  Leonarda Acevedo  MR#:   [id number]  :  1960  ACCOUNT #:  [de-identified]  ADMIT DATE:  2019  DISCHARGE DATE:  2019    DIAGNOSIS:  Vocal cord paralysis and stridor. HOSPITAL COURSE:  The patient is a 28-year-old woman, who is about 6 weeks' status post a total thyroidectomy that was complicated by vocal cord dysfunction. She had multiple bilateral calcified nodules and a hard and rubbery thyroid. She spent about 2 weeks in the hospital at that point and was sent home. She was tolerating a regular diet, able to sit and speak, able to have good 100% room air saturations. Apparently, some time on 2019, she went to her pulmonologist who sent her immediately to the emergency room for stridor, which she had had for approximately 6 weeks. This was seen and evaluated by myself as well as ENT, who did a laryngoscopy that showed poor bilateral vocal cord functioning. During her admission, the patient was essentially unchanged from the last 6 weeks. She was able to saturate the entire hospitalization as well as eat and talk on the phone. She is being sent home with no additional medications and followup in my office within a week.         Jeremiah Palmer MD [id number]  D:  2019 16:47  T:  2019 16:54  JOB #:  [id number] Not applicable

## 2023-06-27 NOTE — H&P ADULT - NSHPPHYSICALEXAM_GEN_ALL_CORE
General: No acute distress, resting comfortably  Neuro: Alert, oriented x 3  Psych: Normal mood & affect  Skin: Warm, dry, no rash, no lesions, no abrasions no ecchymosis   MSK: atraumatic with exception of below  LLE / Lumbosacral     -Inspection/palpation: no tenderness to palpation over the lumbosacral spine and L leg, no swelling, no deformity    -Compartments: soft, nontender     -ROM: limited AROM secondary to pain of the lumbosacral spine, limited PROM secondary to pain of the lumbosacral spine    -Motor: TA/EHL 3/5 left lower extremity, GS/FHL1/5 left lower extremity    -Sensation: numbness of entire foot and ankle, decrease sensation of lower leg below knee, knee and above sensation intact to light touch    -Pulses: 2+ DP pulses, extremity warm and well perfused, capillary refill brisk    RLE    - Motor: TA/GS/EHL/FHL: 5/5 right lower extremity    - Sensation: intact to light touch

## 2023-06-27 NOTE — ED PROVIDER NOTE - PHYSICAL EXAMINATION
VITAL SIGNS: I have reviewed nursing notes and confirm.  CONSTITUTIONAL: Well appearing, in no acute distress.   SKIN:  warm and dry, no acute rash.   HEAD:  normocephalic, atraumatic.  EYES: EOM intact; conjunctiva and sclera clear.  ENT: No nasal discharge; airway clear.   NECK: Supple; non tender.  BACK: no midline or paraspinal ttp  CARD: S1, S2 normal; no murmurs, gallops, or rubs. Regular rate and rhythm.   RESP:  Clear to auscultation b/l, no wheezes, rales or rhonchi.  ABD: Normal bowel sounds; soft; non-distended; non-tender; no guarding/ rebound.  EXT: Normal ROM. No clubbing, cyanosis or edema. 2+ pulses to b/l ue/le.  NEURO: Alert, oriented, CNs intact, gait not tested (unable to walk), strength 2/5 in LLE otherwise 5/5 in other extremities, decreased sensation in LLE  PSYCH: Cooperative, mood and affect appropriate.

## 2023-06-27 NOTE — ED ADULT NURSE NOTE - NSICDXPASTSURGICALHX_GEN_ALL_CORE_FT
PAST SURGICAL HISTORY:  Ankle fracture, right 5 yrs ago tx @[facility] with plates and sharron    S/P tonsillectomy

## 2023-06-27 NOTE — ED PROVIDER NOTE - CLINICAL SUMMARY MEDICAL DECISION MAKING FREE TEXT BOX
68 yo male with a hx of HTN, hypothyroidism, BPH, L lumbar radiculopathy 2/2 ruptured L5/S1 disc herniation p/w worsening weakness and pain in LLE. 2/5 strength in LLE but no other new symptoms- low suspicion for spinal cord compression. Preop labs, ortho spine consult, pain control, steroids +/- MRI pending spine surg evaluation.

## 2023-06-27 NOTE — ED PROVIDER NOTE - NSICDXPASTSURGICALHX_GEN_ALL_CORE_FT
PAST SURGICAL HISTORY:  Ankle fracture, right 5 yrs ago tx @[facility] with plates and sharron    S/P tonsillectomy

## 2023-06-27 NOTE — ED PROVIDER NOTE - OBJECTIVE STATEMENT
68 yo male with a hx of HTN, hypothyroidism, BPH, L lumbar radiculopathy 2/2 ruptured L5/S1 disc herniation p/w worsening weakness and pain in LLE. The patient saw Dr. Pineda in the clinic 11 days ago- was prescribed a Medro dose pack. Pt reports his symptoms improved with the steroids then worsened a few days ago. Denies urinary or fecal retention/incontinence. No weakness/numbness in other extremities. No changes in lower back pain. No new trauma or falls. No fevers, chills, URI symptoms. No skin rashes. No leg swelling. Says he is unable to ambulate due to the weakness and pain.

## 2023-06-27 NOTE — PATIENT PROFILE ADULT - FALL HARM RISK - HARM RISK INTERVENTIONS

## 2023-06-27 NOTE — H&P ADULT - PROBLEM SELECTOR PLAN 1
Stable  Medical clearance needed   LABS: preop labs needed  pain management: as needed  DVT ppx: scds  WBS: WBAT  DIET: regular, NPO after midnight on 6/29  Bowel regimen: as needed  DISPO: admit to orthopedic for OR on 6/29/23 with Dr. Valderrama for L L5-S1 microdiscectomy

## 2023-06-27 NOTE — ED ADULT TRIAGE NOTE - CHIEF COMPLAINT QUOTE
Pt BIBEMS for increasing back pain over the last 2 weeks. Pt reports hx of ruptured L5. Endorsing L leg numbness since May. States numbness resolved with steroids but returned when he finished the dose. No incontinence.

## 2023-06-28 ENCOUNTER — TRANSCRIPTION ENCOUNTER (OUTPATIENT)
Age: 68
End: 2023-06-28

## 2023-06-28 LAB
APPEARANCE UR: CLEAR — SIGNIFICANT CHANGE UP
BILIRUB UR-MCNC: NEGATIVE — SIGNIFICANT CHANGE UP
COLOR SPEC: YELLOW — SIGNIFICANT CHANGE UP
DIFF PNL FLD: NEGATIVE — SIGNIFICANT CHANGE UP
GLUCOSE UR QL: NEGATIVE — SIGNIFICANT CHANGE UP
KETONES UR-MCNC: NEGATIVE — SIGNIFICANT CHANGE UP
LEUKOCYTE ESTERASE UR-ACNC: NEGATIVE — SIGNIFICANT CHANGE UP
NITRITE UR-MCNC: NEGATIVE — SIGNIFICANT CHANGE UP
PH UR: 6 — SIGNIFICANT CHANGE UP (ref 5–8)
PROT UR-MCNC: NEGATIVE MG/DL — SIGNIFICANT CHANGE UP
SP GR SPEC: 1.01 — SIGNIFICANT CHANGE UP (ref 1–1.03)
UROBILINOGEN FLD QL: 0.2 E.U./DL — SIGNIFICANT CHANGE UP

## 2023-06-28 PROCEDURE — 99221 1ST HOSP IP/OBS SF/LOW 40: CPT

## 2023-06-28 PROCEDURE — 71045 X-RAY EXAM CHEST 1 VIEW: CPT | Mod: 26

## 2023-06-28 RX ORDER — POVIDONE-IODINE 5 %
1 AEROSOL (ML) TOPICAL ONCE
Refills: 0 | Status: COMPLETED | OUTPATIENT
Start: 2023-06-29 | End: 2023-06-29

## 2023-06-28 RX ORDER — MULTIVIT WITH MIN/MFOLATE/K2 340-15/3 G
296 POWDER (GRAM) ORAL ONCE
Refills: 0 | Status: COMPLETED | OUTPATIENT
Start: 2023-06-28 | End: 2023-06-28

## 2023-06-28 RX ORDER — CHLORHEXIDINE GLUCONATE 213 G/1000ML
1 SOLUTION TOPICAL ONCE
Refills: 0 | Status: COMPLETED | OUTPATIENT
Start: 2023-06-29 | End: 2023-06-29

## 2023-06-28 RX ORDER — SODIUM CHLORIDE 9 MG/ML
1000 INJECTION, SOLUTION INTRAVENOUS
Refills: 0 | Status: DISCONTINUED | OUTPATIENT
Start: 2023-06-29 | End: 2023-06-30

## 2023-06-28 RX ADMIN — HYDROMORPHONE HYDROCHLORIDE 0.5 MILLIGRAM(S): 2 INJECTION INTRAMUSCULAR; INTRAVENOUS; SUBCUTANEOUS at 12:06

## 2023-06-28 RX ADMIN — OXYCODONE HYDROCHLORIDE 10 MILLIGRAM(S): 5 TABLET ORAL at 09:28

## 2023-06-28 RX ADMIN — TAMSULOSIN HYDROCHLORIDE 0.4 MILLIGRAM(S): 0.4 CAPSULE ORAL at 21:36

## 2023-06-28 RX ADMIN — AMLODIPINE BESYLATE 10 MILLIGRAM(S): 2.5 TABLET ORAL at 05:29

## 2023-06-28 RX ADMIN — OXYCODONE HYDROCHLORIDE 10 MILLIGRAM(S): 5 TABLET ORAL at 08:28

## 2023-06-28 RX ADMIN — OXYCODONE HYDROCHLORIDE 10 MILLIGRAM(S): 5 TABLET ORAL at 19:43

## 2023-06-28 RX ADMIN — Medication 88 MICROGRAM(S): at 05:28

## 2023-06-28 RX ADMIN — Medication 296 MILLILITER(S): at 19:20

## 2023-06-28 RX ADMIN — OXYCODONE HYDROCHLORIDE 10 MILLIGRAM(S): 5 TABLET ORAL at 15:45

## 2023-06-28 RX ADMIN — ZOLPIDEM TARTRATE 5 MILLIGRAM(S): 10 TABLET ORAL at 21:36

## 2023-06-28 RX ADMIN — OXYCODONE HYDROCHLORIDE 10 MILLIGRAM(S): 5 TABLET ORAL at 04:21

## 2023-06-28 RX ADMIN — OXYCODONE HYDROCHLORIDE 10 MILLIGRAM(S): 5 TABLET ORAL at 20:43

## 2023-06-28 RX ADMIN — OXYCODONE HYDROCHLORIDE 10 MILLIGRAM(S): 5 TABLET ORAL at 00:06

## 2023-06-28 RX ADMIN — HYDROMORPHONE HYDROCHLORIDE 0.5 MILLIGRAM(S): 2 INJECTION INTRAMUSCULAR; INTRAVENOUS; SUBCUTANEOUS at 12:21

## 2023-06-28 RX ADMIN — OXYCODONE HYDROCHLORIDE 10 MILLIGRAM(S): 5 TABLET ORAL at 16:45

## 2023-06-28 RX ADMIN — OXYCODONE HYDROCHLORIDE 10 MILLIGRAM(S): 5 TABLET ORAL at 03:21

## 2023-06-28 NOTE — DIETITIAN INITIAL EVALUATION ADULT - ADD RECOMMEND
1. Continue diet as ordered.   >>Encourage & monitor PO intake. Henning dietary preferences as able.   2. Monitor GI tolerance, weight trends, labs, & skin integrity.  3. Defer bowel and pain regimens to team.   4. RD to remain available for diet education/intervention prn.

## 2023-06-28 NOTE — CONSULT NOTE ADULT - ASSESSMENT
PCP: Dr. Maximiliano Maynard  67M w L5/S1 disk herniation on MRI from 05/2023, seen in office by Dr. Pineda now presenting w worsening LLE pain, paresthesias, and weakness, admitted for L5--S1 microdiskectomy w Dr. Valderrama    #Pre-op evaluation  EKG  METS: >6 METS  RCRI: Class I risk  TOLEDO: < 0.1% risk of MI or cardiac arrest intraop or up to 30d post-op    #L5/S1 disk herniation - w LLE paresthesias, and foot drop. Pt denying bowel or bladder incontinence   - mgmt per ortho. On oxycodone 5/10 q4h PRN for mod/severe pain  #HTN - BP at target. c/w home amlodipine 10  #Hypothyroidism - c/w synthroid 88mcg  #BPH - flomax 0.4mg daily    Plan  Overall, patient has good functional status. Low risk for intermediate risk procedure. Optimized for OR    DISPO: Pending OR, PT post-op  Outpatient: Patient requested referral for transfer to [facility] PCP

## 2023-06-28 NOTE — DIETITIAN INITIAL EVALUATION ADULT - OTHER INFO
67M with left L5-S1 large disc herniation on MRI from 5/23 known to Dr. Pineda c/o worsening left lower back pain and weakness of the left leg. Pt reports he initially had an injury 30+years ago when he was skiing that resulted in L3-L5 disc herniation and did not need operative intervention at that time and has had no issue since. He reports at the end of May 2023 he was getting out of bed and felt the initial pain. He said he went for an MRI on 5/23/23 and saw Dr. Pineda on 5/24/23 to discuss the results and the need for surgical intervention. At this time he did not want surgery and was managed conservatively with oral steroids and percocet as needed for pain He is now amendable to surgery.    On assessment, pt resting in bed. Pt on regular diet. Reports excellent oral intake, completing >75% all meals. Denies n/v/d/c, last recorded BM 6/27. Denies abd discomfort/distension. No pressure injuries or edema noted, Raul score 19. Confirms NKFA, no Moravian/ethnic/cultural food preferences noted. Denies difficulty chewing/swallowing. Pt reports typically good intake PTA, notes variable intake the past few weeks due to back pain. Reports UBW 180lb 3 wks ago, admission wt 165lb reveals 15lb/8.3% wt loss in 3 wks - clinically significant. Pt reports combination of intentional and unintentional wt loss, reports this is his goal wt and he does not want to regain. Pt with no visible signs of wasting, pt flexed biceps to demonstrate muscle mass. Per ASPEN criteria, pt does not meet criteria for malnutrition at this time. Discussed importance of ongoing adequate intake, pt receptive. Pt made aware of oral nutrition supplements for additional kcal/protein following surgery if intake falls inadequate again. See nutrition recs.

## 2023-06-28 NOTE — DIETITIAN INITIAL EVALUATION ADULT - EDUCATION DIETARY MODIFICATIONS
Discussed importance of adequate intake especially following surgery. Encourage protein dense meals for post-op wound healing. Pt aware RD remains available for questions/concerns prn./(2) meets goals/outcomes/verbalization

## 2023-06-28 NOTE — DIETITIAN INITIAL EVALUATION ADULT - OTHER CALCULATIONS
Estimated needs based on ABW 165lb/74.8kg as pt's wt is 93% IBW. Needs adjusted for anticipated post-op wound healing, rapid weight loss, and age.   IBW: 178lb/80.9kg

## 2023-06-28 NOTE — DIETITIAN INITIAL EVALUATION ADULT - NS FNS DIET ORDER
Diet, NPO:   NPO for Procedure/Test     NPO Start Date: 29-Jun-2023,   NPO Start Time: 08:00  Except Medications (06-28-23 @ 10:40)  Diet, Regular (06-27-23 @ 18:25)

## 2023-06-28 NOTE — CONSULT NOTE ADULT - SUBJECTIVE AND OBJECTIVE BOX
HPI "67M with left L5-S1 large disc herniation on MRI from  known to Dr. Pineda c/o worsening left lower back pain and weakness of the left leg. Pt reports he initially had an injury 30+years ago when he was skiing that resulted in L3-L5 disc herniation and did not need operative intervention at that time and has had no issue since.  He reports at the end of May 2023 he was getting out of bed and felt the initial pain. He said he went for an MRI on 23 and saw Dr. Pineda on 23 to discuss the results and the need for surgical intervention. At this time he did not want surgery and was managed conservatively with oral steroids and percocet as needed for pain. He followed up with Edwin Galarza on  at the clinic for further discussion of surgical intervention and says he needed more time to think about the decision. He was prescribed a Medro dose pack. He says that the steroid had helped the pain but today is having unbearable pain and weakness of his left leg. He is now amendable to surgery.  Reports he ambulates with a cane since the pain/weakness has started. Reports numbness of his left foot and ankle, with decrease sensation of his lower leg below the knee. as well as tingling of the lower left leg.    Denies urinary or fecal retention or incontinence, denies trauma/fall,  (2023 17:42)"    PCP: Dr. Maximiliano Maynard  67M w L5/S1 disk herniation on MRI from 2023, seen in office by Dr. Pineda now presenting w worsening LLE pain, paresthesias, and weakness, admitted for L5--S1 microdiskectomy w Dr. Valderrama    Pt reports disk herniation back in May. Reports attempting conservative measures but back pain progressed alongside paresthesias in L leg and weakness in his left foot. He denies any fever, chills, sweats, bowel/bladder incontinence. From an activity standpoint, pt was recently walking 5+ miles per day without any LH/Dizziness, chest pain, dyspnea. several years before breaking his patella, he was running around the [employer] [place] multiple times a week wo issues. He denies h/o CAD, MI, CHF, DM, CKD, CVA, DVT/PE.     ROS: 12 point ROS reviewed and otherwise negative as per HPI  FH: No DVT/PE  SH: Non smoker. No EtOH. Works as a .       PAST MEDICAL & SURGICAL HISTORY:  Hypertension      Hypothyroid      Kidney stones      S/P tonsillectomy      Ankle fracture, right  5 yrs ago tx @[facility] with plates and rodes        Home Meds: Home Medications:  amLODIPine: 1 tab(s) orally once a day (2021 18:36)  polyethylene glycol 3350 oral powder for reconstitution: 17 gram(s) orally once a day (2021 18:36)  Synthroid: 1 tab(s) orally once a day (2021 18:36)  zolpidem 5 mg oral tablet: 1 tab(s) orally once a day (at bedtime), As needed, Insomnia (2021 18:36)    Allergies: Allergies    No Known Allergies    Intolerances      Soc:   Advanced Directives: Presumed Full Code     CURRENT MEDICATIONS:   --------------------------------------------------------------------------------------  Neurologic Medications  acetaminophen     Tablet .. 650 milliGRAM(s) Oral every 6 hours PRN Temp greater or equal to 38C (100.4F), Mild Pain (1 - 3)  HYDROmorphone  Injectable 0.5 milliGRAM(s) IV Push every 4 hours PRN breakthorugh pain  oxyCODONE    IR 10 milliGRAM(s) Oral every 4 hours PRN Severe Pain (7 - 10)  oxyCODONE    IR 5 milliGRAM(s) Oral every 4 hours PRN Moderate Pain (4 - 6)  zolpidem 5 milliGRAM(s) Oral at bedtime PRN Insomnia    Respiratory Medications    Cardiovascular Medications  amLODIPine   Tablet 10 milliGRAM(s) Oral daily    Gastrointestinal Medications  senna 2 Tablet(s) Oral at bedtime    Genitourinary Medications  tamsulosin 0.4 milliGRAM(s) Oral at bedtime    Hematologic/Oncologic Medications    Antimicrobial/Immunologic Medications    Endocrine/Metabolic Medications  levothyroxine 88 MICROGram(s) Oral daily    Topical/Other Medications    --------------------------------------------------------------------------------------    VITAL SIGNS, INS/OUTS (last 24 hours):  --------------------------------------------------------------------------------------  ICU Vital Signs Last 24 Hrs  T(C): 36.7 (2023 08:18), Max: 36.8 (2023 14:33)  T(F): 98 (:18), Max: 98.3 (2023 14:33)  HR: 76 (2023 08:18) (76 - 104)  BP: 140/89 (2023 08:18) (128/86 - 155/71)  BP(mean): 106 (2023 08:18) (106 - 106)  ABP: --  ABP(mean): --  RR: 17 (2023 08:18) (16 - 18)  SpO2: 95% (:18) (95% - 96%)    O2 Parameters below as of 2023 08:18  Patient On (Oxygen Delivery Method): room air          I&O's Summary    2023 07:01  -  2023 07:00  --------------------------------------------------------  IN: 0 mL / OUT: 900 mL / NET: -900 mL      --------------------------------------------------------------------------------------    EXAM:  GEN: Male in NAD on RA  HEENT: NC/AT, MMM  CV: RRR, nml S1S2, no murmurs  PULM: nml effort, CTAB  ABD: Soft, non-distended, NABS, non-tender  NEURO  A/O x3, moving all extremities, decreased sensation in LLE from knee distally. 3+/5 in plantarflex/ext on LEFT side. 5/5 on R side.    PSYCH: Appropriate      LABS  --------------------------------------------------------------------------------------  Labs:  CAPILLARY BLOOD GLUCOSE                              15.9   6.43  )-----------( 197      ( 2023 14:23 )             46.7         06-    139  |  104  |  9   ----------------------------<  128<H>  3.9   |  25  |  0.88          LFTs:         Coags:     11.5   ----< 0.97    ( 2023 14:23 )     26.1                Urinalysis Basic - ( 2023 10:22 )    Color: Yellow / Appearance: Clear / S.010 / pH: x  Gluc: x / Ketone: NEGATIVE  / Bili: Negative / Urobili: 0.2 E.U./dL   Blood: x / Protein: NEGATIVE mg/dL / Nitrite: NEGATIVE   Leuk Esterase: NEGATIVE / RBC: x / WBC x   Sq Epi: x / Non Sq Epi: x / Bacteria: x        Urinalysis with Rflx Culture (collected 2023 10:22)        --------------------------------------------------------------------------------------    OTHER LABS    IMAGING RESULTS  ****************

## 2023-06-28 NOTE — DIETITIAN INITIAL EVALUATION ADULT - NSICDXPASTSURGICALHX_GEN_ALL_CORE_FT
PAST SURGICAL HISTORY:  Ankle fracture, right 5 yrs ago tx @[facility] with plates and sharron    S/P tonsillectomy

## 2023-06-28 NOTE — DIETITIAN INITIAL EVALUATION ADULT - PERTINENT MEDS FT
MEDICATIONS  (STANDING):  amLODIPine   Tablet 10 milliGRAM(s) Oral daily  levothyroxine 88 MICROGram(s) Oral daily  senna 2 Tablet(s) Oral at bedtime  tamsulosin 0.4 milliGRAM(s) Oral at bedtime    MEDICATIONS  (PRN):  acetaminophen     Tablet .. 650 milliGRAM(s) Oral every 6 hours PRN Temp greater or equal to 38C (100.4F), Mild Pain (1 - 3)  HYDROmorphone  Injectable 0.5 milliGRAM(s) IV Push every 4 hours PRN breakthorugh pain  oxyCODONE    IR 10 milliGRAM(s) Oral every 4 hours PRN Severe Pain (7 - 10)  oxyCODONE    IR 5 milliGRAM(s) Oral every 4 hours PRN Moderate Pain (4 - 6)  zolpidem 5 milliGRAM(s) Oral at bedtime PRN Insomnia

## 2023-06-29 ENCOUNTER — TRANSCRIPTION ENCOUNTER (OUTPATIENT)
Age: 68
End: 2023-06-29

## 2023-06-29 ENCOUNTER — APPOINTMENT (OUTPATIENT)
Dept: ORTHOPEDIC SURGERY | Facility: HOSPITAL | Age: 68
End: 2023-06-29

## 2023-06-29 PROCEDURE — 63030 LAMOT DCMPRN NRV RT 1 LMBR: CPT | Mod: LT

## 2023-06-29 PROCEDURE — 99232 SBSQ HOSP IP/OBS MODERATE 35: CPT

## 2023-06-29 PROCEDURE — 88304 TISSUE EXAM BY PATHOLOGIST: CPT | Mod: 26

## 2023-06-29 DEVICE — SURGIFLO HEMOSTATIC MATRIX KIT: Type: IMPLANTABLE DEVICE | Status: FUNCTIONAL

## 2023-06-29 DEVICE — SURGIFOAM PAD 8CM X 12.5CM X 10MM (100): Type: IMPLANTABLE DEVICE | Status: FUNCTIONAL

## 2023-06-29 RX ORDER — BENZOCAINE AND MENTHOL 5; 1 G/100ML; G/100ML
1 LIQUID ORAL
Refills: 0 | Status: DISCONTINUED | OUTPATIENT
Start: 2023-06-29 | End: 2023-06-30

## 2023-06-29 RX ORDER — ONDANSETRON 8 MG/1
4 TABLET, FILM COATED ORAL EVERY 6 HOURS
Refills: 0 | Status: DISCONTINUED | OUTPATIENT
Start: 2023-06-29 | End: 2023-06-30

## 2023-06-29 RX ORDER — APREPITANT 80 MG/1
40 CAPSULE ORAL ONCE
Refills: 0 | Status: COMPLETED | OUTPATIENT
Start: 2023-06-29 | End: 2023-06-29

## 2023-06-29 RX ORDER — CEFAZOLIN SODIUM 1 G
2000 VIAL (EA) INJECTION EVERY 8 HOURS
Refills: 0 | Status: COMPLETED | OUTPATIENT
Start: 2023-06-29 | End: 2023-06-30

## 2023-06-29 RX ORDER — SODIUM CHLORIDE 9 MG/ML
1000 INJECTION, SOLUTION INTRAVENOUS
Refills: 0 | Status: DISCONTINUED | OUTPATIENT
Start: 2023-06-29 | End: 2023-06-30

## 2023-06-29 RX ORDER — HYDROMORPHONE HYDROCHLORIDE 2 MG/ML
0.5 INJECTION INTRAMUSCULAR; INTRAVENOUS; SUBCUTANEOUS
Refills: 0 | Status: DISCONTINUED | OUTPATIENT
Start: 2023-06-29 | End: 2023-06-30

## 2023-06-29 RX ORDER — DEXAMETHASONE 0.5 MG/5ML
6 ELIXIR ORAL EVERY 6 HOURS
Refills: 0 | Status: DISCONTINUED | OUTPATIENT
Start: 2023-06-29 | End: 2023-06-30

## 2023-06-29 RX ORDER — HYDROMORPHONE HYDROCHLORIDE 2 MG/ML
0.5 INJECTION INTRAMUSCULAR; INTRAVENOUS; SUBCUTANEOUS ONCE
Refills: 0 | Status: DISCONTINUED | OUTPATIENT
Start: 2023-06-29 | End: 2023-06-30

## 2023-06-29 RX ORDER — ACETAMINOPHEN 500 MG
1000 TABLET ORAL ONCE
Refills: 0 | Status: COMPLETED | OUTPATIENT
Start: 2023-06-29 | End: 2023-06-29

## 2023-06-29 RX ORDER — METHOCARBAMOL 500 MG/1
500 TABLET, FILM COATED ORAL EVERY 8 HOURS
Refills: 0 | Status: DISCONTINUED | OUTPATIENT
Start: 2023-06-29 | End: 2023-06-30

## 2023-06-29 RX ADMIN — OXYCODONE HYDROCHLORIDE 10 MILLIGRAM(S): 5 TABLET ORAL at 09:47

## 2023-06-29 RX ADMIN — TAMSULOSIN HYDROCHLORIDE 0.4 MILLIGRAM(S): 0.4 CAPSULE ORAL at 21:39

## 2023-06-29 RX ADMIN — HYDROMORPHONE HYDROCHLORIDE 0.5 MILLIGRAM(S): 2 INJECTION INTRAMUSCULAR; INTRAVENOUS; SUBCUTANEOUS at 22:27

## 2023-06-29 RX ADMIN — OXYCODONE HYDROCHLORIDE 10 MILLIGRAM(S): 5 TABLET ORAL at 05:36

## 2023-06-29 RX ADMIN — HYDROMORPHONE HYDROCHLORIDE 0.5 MILLIGRAM(S): 2 INJECTION INTRAMUSCULAR; INTRAVENOUS; SUBCUTANEOUS at 20:13

## 2023-06-29 RX ADMIN — OXYCODONE HYDROCHLORIDE 10 MILLIGRAM(S): 5 TABLET ORAL at 12:47

## 2023-06-29 RX ADMIN — HYDROMORPHONE HYDROCHLORIDE 0.5 MILLIGRAM(S): 2 INJECTION INTRAMUSCULAR; INTRAVENOUS; SUBCUTANEOUS at 19:58

## 2023-06-29 RX ADMIN — Medication 1000 MILLIGRAM(S): at 14:52

## 2023-06-29 RX ADMIN — METHOCARBAMOL 500 MILLIGRAM(S): 500 TABLET, FILM COATED ORAL at 21:39

## 2023-06-29 RX ADMIN — BENZOCAINE AND MENTHOL 1 LOZENGE: 5; 1 LIQUID ORAL at 23:38

## 2023-06-29 RX ADMIN — HYDROMORPHONE HYDROCHLORIDE 0.5 MILLIGRAM(S): 2 INJECTION INTRAMUSCULAR; INTRAVENOUS; SUBCUTANEOUS at 19:39

## 2023-06-29 RX ADMIN — APREPITANT 40 MILLIGRAM(S): 80 CAPSULE ORAL at 14:51

## 2023-06-29 RX ADMIN — HYDROMORPHONE HYDROCHLORIDE 0.5 MILLIGRAM(S): 2 INJECTION INTRAMUSCULAR; INTRAVENOUS; SUBCUTANEOUS at 19:24

## 2023-06-29 RX ADMIN — OXYCODONE HYDROCHLORIDE 10 MILLIGRAM(S): 5 TABLET ORAL at 13:47

## 2023-06-29 RX ADMIN — OXYCODONE HYDROCHLORIDE 10 MILLIGRAM(S): 5 TABLET ORAL at 20:54

## 2023-06-29 RX ADMIN — OXYCODONE HYDROCHLORIDE 10 MILLIGRAM(S): 5 TABLET ORAL at 20:24

## 2023-06-29 RX ADMIN — OXYCODONE HYDROCHLORIDE 10 MILLIGRAM(S): 5 TABLET ORAL at 08:47

## 2023-06-29 RX ADMIN — SODIUM CHLORIDE 150 MILLILITER(S): 9 INJECTION, SOLUTION INTRAVENOUS at 19:58

## 2023-06-29 RX ADMIN — OXYCODONE HYDROCHLORIDE 10 MILLIGRAM(S): 5 TABLET ORAL at 00:09

## 2023-06-29 RX ADMIN — SODIUM CHLORIDE 100 MILLILITER(S): 9 INJECTION, SOLUTION INTRAVENOUS at 12:24

## 2023-06-29 RX ADMIN — Medication 100 MILLIGRAM(S): at 23:19

## 2023-06-29 RX ADMIN — HYDROMORPHONE HYDROCHLORIDE 0.5 MILLIGRAM(S): 2 INJECTION INTRAMUSCULAR; INTRAVENOUS; SUBCUTANEOUS at 23:27

## 2023-06-29 RX ADMIN — AMLODIPINE BESYLATE 10 MILLIGRAM(S): 2.5 TABLET ORAL at 05:31

## 2023-06-29 RX ADMIN — Medication 50 MILLIGRAM(S): at 21:39

## 2023-06-29 RX ADMIN — OXYCODONE HYDROCHLORIDE 10 MILLIGRAM(S): 5 TABLET ORAL at 04:36

## 2023-06-29 RX ADMIN — Medication 88 MICROGRAM(S): at 05:31

## 2023-06-29 RX ADMIN — Medication 1 APPLICATION(S): at 14:51

## 2023-06-29 RX ADMIN — CHLORHEXIDINE GLUCONATE 1 APPLICATION(S): 213 SOLUTION TOPICAL at 13:30

## 2023-06-29 RX ADMIN — OXYCODONE HYDROCHLORIDE 10 MILLIGRAM(S): 5 TABLET ORAL at 01:09

## 2023-06-29 RX ADMIN — ZOLPIDEM TARTRATE 5 MILLIGRAM(S): 10 TABLET ORAL at 23:19

## 2023-06-29 NOTE — BRIEF OPERATIVE NOTE - ASSISTANT(S)
Quality 474: Zoster Vaccination Status: Shingrix Vaccination Administered or Previously Received Detail Level: Detailed Quality 130: Documentation Of Current Medications In The Medical Record: Current Medications Documented Quality 131: Pain Assessment And Follow-Up: Pain assessment using a standardized tool is documented as negative, no follow-up plan required harry constantino

## 2023-06-29 NOTE — DISCHARGE NOTE PROVIDER - NSDCCPCAREPLAN_GEN_ALL_CORE_FT
PRINCIPAL DISCHARGE DIAGNOSIS  Diagnosis: Lumbar disc herniation  Assessment and Plan of Treatment:       SECONDARY DISCHARGE DIAGNOSES  Diagnosis: Leg weakness  Assessment and Plan of Treatment:

## 2023-06-29 NOTE — PRE-ANESTHESIA EVALUATION ADULT - TEMPERATURE IN CELSIUS (DEGREES C)
36.4 Composite Graft Text: The defect edges were debeveled with a #15 scalpel blade.  Given the location of the defect, shape of the defect, the proximity to free margins and the fact the defect was full thickness a composite graft was deemed most appropriate.  The defect was outline and then transferred to the donor site.  A full thickness graft was then excised from the donor site. The graft was then placed in the primary defect, oriented appropriately and then sutured into place.  The secondary defect was then repaired using a primary closure.

## 2023-06-29 NOTE — DISCHARGE NOTE PROVIDER - NSDCMRMEDTOKEN_GEN_ALL_CORE_FT
amLODIPine: 1 tab(s) orally once a day  amoxicillin-clavulanate 875 mg-125 mg oral tablet: 1 tab(s) orally 2 times a day   aspirin 325 mg oral delayed release tablet: 1 tab(s) orally 2 times a day for 30 days postop  celecoxib 200 mg oral capsule: 1 cap(s) orally 2 times a day  diclofenac potassium 50 mg oral tablet: 1 tab(s) orally every 8 hours, As Needed -for moderate pain Take with food  gabapentin 300 mg oral capsule: 1 cap(s) orally 3 times a day  Home Physical Therapy : RN evaluation 1-2 visits   Home PT: 3x weely x2 weeks     s/p right patella ORIF 1/21/2020  HYDROmorphone 4 mg oral tablet: 1 tab(s) orally every 4 hours, As Needed - for severe pain MDD:6   pantoprazole 40 mg oral delayed release tablet: 1 tab(s) orally once a day (before a meal)  Percocet 5 mg-325 mg oral tablet: 1 tab(s) orally every 4-6 hours, As Needed for pain MDD:12  polyethylene glycol 3350 oral powder for reconstitution: 17 gram(s) orally once a day  Synthroid: 1 tab(s) orally once a day  zolpidem 5 mg oral tablet: 1 tab(s) orally once a day (at bedtime), As needed, Insomnia   amLODIPine: 1 tab(s) orally once a day  gabapentin 300 mg oral capsule: 1 cap(s) orally 3 times a day  levothyroxine 88 mcg (0.088 mg) oral tablet: 1 tab(s) orally once a day  methocarbamol 500 mg oral tablet: 1 tab(s) orally every 8 hours  oxyCODONE 5 mg oral tablet: 1 tab(s) orally every 6 hours as needed for Moderate Pain (4 - 6) MDD: 4  senna leaf extract oral tablet: 2 tab(s) orally once a day (at bedtime)  tamsulosin 0.4 mg oral capsule: 1 cap(s) orally once a day (at bedtime)  zolpidem 5 mg oral tablet: 1 tab(s) orally once a day (at bedtime), As needed, Insomnia   amLODIPine: 1 tab(s) orally once a day  gabapentin 300 mg oral capsule: 1 cap(s) orally 3 times a day  levothyroxine 88 mcg (0.088 mg) oral tablet: 1 tab(s) orally once a day  methocarbamol 500 mg oral tablet: 1 tab(s) orally every 8 hours  oxyCODONE 5 mg oral tablet: 1 tab(s) orally every 4 hours as needed for Moderate Pain (4 - 6) MDD: 4  senna leaf extract oral tablet: 2 tab(s) orally once a day (at bedtime)  tamsulosin 0.4 mg oral capsule: 1 cap(s) orally once a day (at bedtime)  zolpidem 5 mg oral tablet: 1 tab(s) orally once a day (at bedtime), As needed, Insomnia

## 2023-06-29 NOTE — PRE-OP CHECKLIST - LOOSE TEETH
NSGY Attg:    see above    patient seen and examined     agree with exam as above    imaging reviewed    agree with plan as above no

## 2023-06-29 NOTE — DISCHARGE NOTE PROVIDER - NSDCFUADDINST_GEN_ALL_CORE_FT
ACTIVITY:  - No extreme bending, extending, turning, twisting, or straining. No strenuous activity, heavy lifting, driving or returning to work until cleared by MD.    DRESSING: ***  - Keep your incision clean and dry. Do not pick at your incision. Do not apply creams, ointments or oils to your incision until cleared by your surgeon. Do not soak your incision in sitting water (ie tubs, pools, lakes, etc.) until cleared by your surgeon. You may let clean, running water fall over your incision.    MEDICATION/ANTICOAGULATION:  - You have been prescribed medications for pain:    - Tylenol (Acetaminophen) for mild to moderate pain. Do not exceed 3,000mg daily.    - For more severe pain, you may continue to take the Tylenol with the addition of narcotic pain medication. Take this medication as prescribed. Do not take more than prescribed. Note that this medication may cause drowsiness or dizziness. Do not operate machinery. This medication may cause constipation.  - If you have been prescribed a muscle relaxer, take this medication as needed for muscle spasm. Follow instructions on bottle.  - Try to have regular bowel movements. Take stool softener or laxative if necessary. You may wish to take Miralax daily until you have regular bowel movements.   - Do not take antiinflammatories (Aleve, Advil, Naproxen, Ibuprofen, etc.) until cleared by your surgeon. Tylenol is not an anti-inflammatory and okay to take (see above).  - If you have a pain management physician, please follow-up with them postoperatively.   - If you experience any negative side effects of your medications, please call your surgeon's office to discuss.    Follow-up:  - Call to schedule an appt with Dr. Valderrama for follow up. If you have staples or sutures they will be removed in office.  - Please follow-up with your primary care physician or any other specialist you see postoperatively, if needed.     - Contact your doctor if you experience: fever greater than 101.5, chills, chest pain, difficulty breathing, redness or excessive drainage around the incision, other concerns.

## 2023-06-29 NOTE — PRE-OP CHECKLIST - NSBLOODTRANS_GEN_A_CORE_SIUH
Past Medical History


Past Medical History:  No Pertinent History


Past Surgical History:  Appendectomy, Other


Additional Past Surgical Histo:  RIGHT KNEE REPAIR


Alcohol Use:  Occasionally


Drug Use:  None





Adult General


Chief Complaint


Chief Complaint:  LOWER BACK PAIN OR INJURY





HPI


HPI





Patient is a 22  year old male who presents with low back pain.  Patient states 

he has been having low back pain over the last 2 weeks. He does to strenuous 

manual labor but does not remember a specific incident where he sustained 

injury. Pain has been intermittent and is worse with movement. Pain causes him 

sometimes difficulty with ambulation. He does not have any lower extremity 

weakness, numbness, or tingling. He does state that the pain radiates down the 

right mid thigh. He denies prior history of chronic low back pain but states he 

has had strains of the low back in the past. None have lasted as long as this 

one. He denies hematuria or any urinary symptoms. No fever or chills. No motor 

weakness.





Review of Systems


Review of Systems





Constitutional: Denies fever or chills 


Eyes: Denies change 


HENT: Denies


Respiratory: Denies 


Cardiovascular: No additional information


GI: Denies abdominal pain, nausea


: Denies dysuria 


Musculoskeletal: Denies joint pain


Integument: Denies rash or skin lesions 


Neurologic: Denies headache, focal weakness 





All other systems were reviewed and found to be within normal limits, except as 

documented in this note.





Current Medications


Current Medications





Current Medications








 Medications


  (Trade)  Dose


 Ordered  [facility]  Start Time


 Stop Time Status Last Admin


Dose Admin


 


 Diazepam


  (Valium)  5 mg  1X  ONCE  10/24/18 01:00


 10/24/18 01:01 DC 10/24/18 00:54


5 MG


 


 Morphine Sulfate


  (Morphine


 Sulfate)  10 mg  1X  ONCE  10/24/18 01:00


 10/24/18 01:01 DC 10/24/18 00:54


10 MG


 


 Prednisone


  (Prednisone)  60 mg  1X  ONCE  10/24/18 01:00


 10/24/18 01:01 DC 10/24/18 00:54


60 MG











Allergies


Allergies





Allergies








Coded Allergies Type Severity Reaction Last Updated Verified


 


  No Known Drug Allergies    8/13/18 No











Physical Exam


Physical Exam





Constitutional: Well developed, well nourished, no acute distress, non-toxic 

appearance


HENT: Normocephalic, atraumatic, bilateral external ears normal, oropharynx 

moist 


Neck: Normal range of motion 


Cardiovascular:Heart rate regular rhythm, no murmur


Lungs & Thorax:  Bilateral breath sounds clear  


Skin: Warm, dry, no erythema 


Back: Tender to palpate about the paraspinal muscles and lumbar area. No 

midline tenderness.  


Neurologic: Alert and oriented X 3, normal motor function, normal sensory 

function, no focal deficits noted. 5 over 5 motor strength bilateral lower 

extremities. Deep tendon reflexes are 2 over 4 at the Achilles and patellar 

levels. Patient has normal steady gait.


Psychologic: Affect normal





Current Patient Data


Vital Signs





 Vital Signs








  Date Time  Temp Pulse Resp B/P (MAP) Pulse Ox O2 Delivery O2 Flow Rate FiO2


 


10/24/18 02:01  102 18 134/83 (100) 97 Room Air  


 


10/24/18 00:32 98.3       





 98.3       











EKG


EKG


[]





Radiology/Procedures


Radiology/Procedures


[]





Course & Med Decision Making


Course & Med Decision Making


Pertinent Labs and Imaging studies reviewed. (See chart for details)





Patient is seen and examined for back pain that is musculoskeletal.  No red 

flag findings on HPI or PE.  Will give medications for symptom relief.





04:45:  Patient currently feeling improved. He was given a dose of morphine and 

Valium in the ER. Plan is for discharge home. He is placed on a prednisone 

taper along with Percocet and Valium as needed at home. Narcotic precautions 

are discussed and all of his questions are answered prior to discharge home. He 

is not driving home this evening. He is accompanied by a female friend.





Toshia Disclaimer


Dragon Disclaimer


This electronic medical record was generated, in whole or in part, using a 

voice recognition dictation system.





Departure


Departure


Referrals:  


NO PCP (PCP)


Scripts


Diazepam (VALIUM) 5 Mg Tablet


5 MG PO TID for muscle spasm, #21 TAB


   Prov: JUAN MAY DO         10/24/18 


Oxycodone/Apap 5-325 (PERCOCET 5-325 MG TABLET) 1 Each Tablet


1-2 EACH PO PRN TID PRN for severe pain, #20 TAB


   pain


   Prov: JUAN MAY DO         10/24/18 


Prednisone (PREDNISONE) 20 Mg Tablet


1 TAB PO UD, #21 TAB


   Take 3 tablets by mouth daily for 3 days. Then take 2


   tablets by mouth daily for 3 days. Then take one tablet by


   mouth daily for 3 days. Then take one half tablet daily


   until gone


   Prov: JUAN MAY DO         10/24/18











JUAN MAY DO Oct 24, 2018 01:06
no...

## 2023-06-29 NOTE — DISCHARGE NOTE PROVIDER - CARE PROVIDER_API CALL
Nelson Valderrama Justus  Orthopaedic Surgery  [address] [place] [address], [address]  [place] [address]  Phone: [phone]  Fax: [phone]  Follow Up Time: 2 weeks

## 2023-06-29 NOTE — DISCHARGE NOTE PROVIDER - HOSPITAL COURSE
Admit to Orthopedics  MRI L5-S1 HNP from 5/23 reviewed   OR for L5-S1 microdiscectomy   Periop Antibx, steroids  Preop optimization- Medicine team   DVT ppx- SCDs  PT , WBAT, OOB   Pain control

## 2023-06-30 ENCOUNTER — TRANSCRIPTION ENCOUNTER (OUTPATIENT)
Age: 68
End: 2023-06-30

## 2023-06-30 VITALS
HEART RATE: 98 BPM | TEMPERATURE: 98 F | SYSTOLIC BLOOD PRESSURE: 152 MMHG | DIASTOLIC BLOOD PRESSURE: 84 MMHG | RESPIRATION RATE: 17 BRPM | OXYGEN SATURATION: 96 %

## 2023-06-30 LAB
ANION GAP SERPL CALC-SCNC: 8 MMOL/L — SIGNIFICANT CHANGE UP (ref 5–17)
BASOPHILS # BLD AUTO: 0.01 K/UL — SIGNIFICANT CHANGE UP (ref 0–0.2)
BASOPHILS NFR BLD AUTO: 0.1 % — SIGNIFICANT CHANGE UP (ref 0–2)
BUN SERPL-MCNC: 15 MG/DL — SIGNIFICANT CHANGE UP (ref 7–23)
CALCIUM SERPL-MCNC: 8.8 MG/DL — SIGNIFICANT CHANGE UP (ref 8.4–10.5)
CHLORIDE SERPL-SCNC: 104 MMOL/L — SIGNIFICANT CHANGE UP (ref 96–108)
CO2 SERPL-SCNC: 22 MMOL/L — SIGNIFICANT CHANGE UP (ref 22–31)
CREAT SERPL-MCNC: 0.73 MG/DL — SIGNIFICANT CHANGE UP (ref 0.5–1.3)
EGFR: 100 ML/MIN/1.73M2 — SIGNIFICANT CHANGE UP
EOSINOPHIL # BLD AUTO: 0 K/UL — SIGNIFICANT CHANGE UP (ref 0–0.5)
EOSINOPHIL NFR BLD AUTO: 0 % — SIGNIFICANT CHANGE UP (ref 0–6)
GLUCOSE SERPL-MCNC: 164 MG/DL — HIGH (ref 70–99)
HCT VFR BLD CALC: 46.7 % — SIGNIFICANT CHANGE UP (ref 39–50)
HGB BLD-MCNC: 15.8 G/DL — SIGNIFICANT CHANGE UP (ref 13–17)
IMM GRANULOCYTES NFR BLD AUTO: 0.7 % — SIGNIFICANT CHANGE UP (ref 0–0.9)
LYMPHOCYTES # BLD AUTO: 0.55 K/UL — LOW (ref 1–3.3)
LYMPHOCYTES # BLD AUTO: 4.3 % — LOW (ref 13–44)
MCHC RBC-ENTMCNC: 32.6 PG — SIGNIFICANT CHANGE UP (ref 27–34)
MCHC RBC-ENTMCNC: 33.8 GM/DL — SIGNIFICANT CHANGE UP (ref 32–36)
MCV RBC AUTO: 96.3 FL — SIGNIFICANT CHANGE UP (ref 80–100)
MONOCYTES # BLD AUTO: 0.24 K/UL — SIGNIFICANT CHANGE UP (ref 0–0.9)
MONOCYTES NFR BLD AUTO: 1.9 % — LOW (ref 2–14)
NEUTROPHILS # BLD AUTO: 11.92 K/UL — HIGH (ref 1.8–7.4)
NEUTROPHILS NFR BLD AUTO: 93 % — HIGH (ref 43–77)
NRBC # BLD: 0 /100 WBCS — SIGNIFICANT CHANGE UP (ref 0–0)
PLATELET # BLD AUTO: 208 K/UL — SIGNIFICANT CHANGE UP (ref 150–400)
POTASSIUM SERPL-MCNC: 3.7 MMOL/L — SIGNIFICANT CHANGE UP (ref 3.5–5.3)
POTASSIUM SERPL-SCNC: 3.7 MMOL/L — SIGNIFICANT CHANGE UP (ref 3.5–5.3)
RBC # BLD: 4.85 M/UL — SIGNIFICANT CHANGE UP (ref 4.2–5.8)
RBC # FLD: 13.9 % — SIGNIFICANT CHANGE UP (ref 10.3–14.5)
SODIUM SERPL-SCNC: 134 MMOL/L — LOW (ref 135–145)
WBC # BLD: 12.81 K/UL — HIGH (ref 3.8–10.5)
WBC # FLD AUTO: 12.81 K/UL — HIGH (ref 3.8–10.5)

## 2023-06-30 PROCEDURE — 97161 PT EVAL LOW COMPLEX 20 MIN: CPT

## 2023-06-30 PROCEDURE — 86850 RBC ANTIBODY SCREEN: CPT

## 2023-06-30 PROCEDURE — 76000 FLUOROSCOPY <1 HR PHYS/QHP: CPT

## 2023-06-30 PROCEDURE — 85610 PROTHROMBIN TIME: CPT

## 2023-06-30 PROCEDURE — 81003 URINALYSIS AUTO W/O SCOPE: CPT

## 2023-06-30 PROCEDURE — 86900 BLOOD TYPING SEROLOGIC ABO: CPT

## 2023-06-30 PROCEDURE — 85025 COMPLETE CBC W/AUTO DIFF WBC: CPT

## 2023-06-30 PROCEDURE — 71045 X-RAY EXAM CHEST 1 VIEW: CPT

## 2023-06-30 PROCEDURE — 88304 TISSUE EXAM BY PATHOLOGIST: CPT

## 2023-06-30 PROCEDURE — 36415 COLL VENOUS BLD VENIPUNCTURE: CPT

## 2023-06-30 PROCEDURE — 86901 BLOOD TYPING SEROLOGIC RH(D): CPT

## 2023-06-30 PROCEDURE — 85730 THROMBOPLASTIN TIME PARTIAL: CPT

## 2023-06-30 PROCEDURE — C1889: CPT

## 2023-06-30 PROCEDURE — 96375 TX/PRO/DX INJ NEW DRUG ADDON: CPT

## 2023-06-30 PROCEDURE — 99232 SBSQ HOSP IP/OBS MODERATE 35: CPT

## 2023-06-30 PROCEDURE — 99285 EMERGENCY DEPT VISIT HI MDM: CPT | Mod: 25

## 2023-06-30 PROCEDURE — 96374 THER/PROPH/DIAG INJ IV PUSH: CPT

## 2023-06-30 PROCEDURE — 80048 BASIC METABOLIC PNL TOTAL CA: CPT

## 2023-06-30 RX ORDER — POLYETHYLENE GLYCOL 3350 17 G/17G
17 POWDER, FOR SOLUTION ORAL DAILY
Refills: 0 | Status: DISCONTINUED | OUTPATIENT
Start: 2023-06-30 | End: 2023-06-30

## 2023-06-30 RX ORDER — LEVOTHYROXINE SODIUM 125 MCG
1 TABLET ORAL
Qty: 0 | Refills: 0 | DISCHARGE
Start: 2023-06-30

## 2023-06-30 RX ORDER — METHOCARBAMOL 500 MG/1
1 TABLET, FILM COATED ORAL
Qty: 21 | Refills: 0
Start: 2023-06-30 | End: 2023-07-06

## 2023-06-30 RX ORDER — TAMSULOSIN HYDROCHLORIDE 0.4 MG/1
1 CAPSULE ORAL
Qty: 0 | Refills: 0 | DISCHARGE
Start: 2023-06-30

## 2023-06-30 RX ORDER — MULTIVIT WITH MIN/MFOLATE/K2 340-15/3 G
296 POWDER (GRAM) ORAL ONCE
Refills: 0 | Status: COMPLETED | OUTPATIENT
Start: 2023-06-30 | End: 2023-06-30

## 2023-06-30 RX ORDER — SENNA PLUS 8.6 MG/1
2 TABLET ORAL
Qty: 0 | Refills: 0 | DISCHARGE
Start: 2023-06-30

## 2023-06-30 RX ORDER — LEVOTHYROXINE SODIUM 125 MCG
1 TABLET ORAL
Qty: 0 | Refills: 0 | DISCHARGE

## 2023-06-30 RX ORDER — OXYCODONE HYDROCHLORIDE 5 MG/1
1 TABLET ORAL
Qty: 28 | Refills: 0
Start: 2023-06-30 | End: 2023-07-06

## 2023-06-30 RX ORDER — OXYCODONE HYDROCHLORIDE 5 MG/1
1 TABLET ORAL
Qty: 42 | Refills: 0
Start: 2023-06-30 | End: 2023-07-06

## 2023-06-30 RX ORDER — TAMSULOSIN HYDROCHLORIDE 0.4 MG/1
1 CAPSULE ORAL
Qty: 30 | Refills: 0
Start: 2023-06-30 | End: 2023-07-29

## 2023-06-30 RX ADMIN — AMLODIPINE BESYLATE 10 MILLIGRAM(S): 2.5 TABLET ORAL at 05:56

## 2023-06-30 RX ADMIN — Medication 6 MILLIGRAM(S): at 00:34

## 2023-06-30 RX ADMIN — OXYCODONE HYDROCHLORIDE 10 MILLIGRAM(S): 5 TABLET ORAL at 05:56

## 2023-06-30 RX ADMIN — OXYCODONE HYDROCHLORIDE 10 MILLIGRAM(S): 5 TABLET ORAL at 15:56

## 2023-06-30 RX ADMIN — OXYCODONE HYDROCHLORIDE 10 MILLIGRAM(S): 5 TABLET ORAL at 01:33

## 2023-06-30 RX ADMIN — OXYCODONE HYDROCHLORIDE 10 MILLIGRAM(S): 5 TABLET ORAL at 00:33

## 2023-06-30 RX ADMIN — HYDROMORPHONE HYDROCHLORIDE 0.5 MILLIGRAM(S): 2 INJECTION INTRAMUSCULAR; INTRAVENOUS; SUBCUTANEOUS at 03:16

## 2023-06-30 RX ADMIN — METHOCARBAMOL 500 MILLIGRAM(S): 500 TABLET, FILM COATED ORAL at 14:32

## 2023-06-30 RX ADMIN — OXYCODONE HYDROCHLORIDE 10 MILLIGRAM(S): 5 TABLET ORAL at 15:34

## 2023-06-30 RX ADMIN — Medication 296 MILLILITER(S): at 06:49

## 2023-06-30 RX ADMIN — OXYCODONE HYDROCHLORIDE 10 MILLIGRAM(S): 5 TABLET ORAL at 10:53

## 2023-06-30 RX ADMIN — METHOCARBAMOL 500 MILLIGRAM(S): 500 TABLET, FILM COATED ORAL at 05:56

## 2023-06-30 RX ADMIN — Medication 50 MILLIGRAM(S): at 05:56

## 2023-06-30 RX ADMIN — Medication 88 MICROGRAM(S): at 05:56

## 2023-06-30 RX ADMIN — Medication 100 MILLIGRAM(S): at 07:20

## 2023-06-30 RX ADMIN — Medication 50 MILLIGRAM(S): at 14:39

## 2023-06-30 RX ADMIN — Medication 6 MILLIGRAM(S): at 12:57

## 2023-06-30 RX ADMIN — OXYCODONE HYDROCHLORIDE 10 MILLIGRAM(S): 5 TABLET ORAL at 11:40

## 2023-06-30 RX ADMIN — OXYCODONE HYDROCHLORIDE 10 MILLIGRAM(S): 5 TABLET ORAL at 06:56

## 2023-06-30 RX ADMIN — Medication 6 MILLIGRAM(S): at 05:57

## 2023-06-30 RX ADMIN — HYDROMORPHONE HYDROCHLORIDE 0.5 MILLIGRAM(S): 2 INJECTION INTRAMUSCULAR; INTRAVENOUS; SUBCUTANEOUS at 04:16

## 2023-06-30 NOTE — PROGRESS NOTE ADULT - ASSESSMENT
PCP: Dr. Maximiliano Maynard  67M w L5/S1 disk herniation on MRI from 05/2023, seen in office by Dr. Pineda now presenting w worsening LLE pain, paresthesias, and weakness, s/p L5--S1 microdiskectomy w Dr. Valderrama 6/29    #Post-op state - pain controlled. On SCDs. pt ambulatory. On bowel regimen and incentive spirometer.   #L5/S1 disk herniation - w LLE paresthesias, and foot drop. Pt denying bowel or bladder incontinence   - mgmt per ortho. On oxycodone 5/10 q4h PRN for mod/severe pain   - Decadron 6mg IV q6 x6 doses per ortho    #Leukocytosis - likely reactive d/t surgery and decadron. Afebrile and non-toxic. Monitor clinically.  #HTN - BP at target. c/w home amlodipine 10  #Hypothyroidism - c/w synthroid 88mcg  #BPH - flomax 0.4mg daily    Plan  Overall doing well. PT today.     DISPO: Pending PT post-op - anticipate home  Outpatient: Patient requested referral for transfer to [facility] PCP  
PCP: Dr. Maximiliano Maynard  67M w L5/S1 disk herniation on MRI from 05/2023, seen in office by Dr. Pineda now presenting w worsening LLE pain, paresthesias, and weakness, admitted for L5--S1 microdiskectomy w Dr. Valderrama    #Pre-op evaluation  EKG: NSR no ST/T wave changes  METS: >6 METS  RCRI: Class I risk  T[place]ED[place]: < 0.1% risk of MI or cardiac arrest intraop or up to 30d post-op    #L5/S1 disk herniation - w LLE paresthesias, and foot drop. Pt denying bowel or bladder incontinence   - mgmt per ortho. On oxycodone 5/10 q4h PRN for mod/severe pain  #HTN - BP at target. c/w home amlodipine 10  #Hypothyroidism - c/w synthroid 88mcg  #BPH - flomax 0.4mg daily    Plan  Overall, patient has good functional status. Low risk for intermediate risk procedure. Optimized for OR  **Pt  requesting to have lozenge and honey post-op during dinner to help prevent a sore throat.     DISPO: Pending OR, PT post-op  Outpatient: Patient requested referral for transfer to [facility] PCP

## 2023-06-30 NOTE — PHYSICAL THERAPY INITIAL EVALUATION ADULT - GAIT DEVIATIONS NOTED, PT EVAL
Pt w/ appropriate kayode/step length, steady gait, no LOB/knee buckling noted; good negotiation through hallway obstacles without gait disturbances noted. Good aerobic endurance, no SOB observed.

## 2023-06-30 NOTE — PROGRESS NOTE ADULT - SUBJECTIVE AND OBJECTIVE BOX
Ortho Note    Subjective:  Pt comfortable without complaints, pain controlled with current pain medication regimen.  Denies CP, SOB, N/V, numbness/tingling   Patient reports improvement of strength to his LLE.  Reviewed plan of care with patient at bedside    Vital Signs Last 24 Hrs  T(C): 36.8 (06-30-23 @ 08:36), Max: 36.8 (06-30-23 @ 08:36)  T(F): 98.2 (06-30-23 @ 08:36), Max: 98.2 (06-30-23 @ 08:36)  HR: 98 (06-30-23 @ 08:36) (88 - 98)  BP: 128/78 (06-30-23 @ 08:36) (128/78 - 130/73)  BP(mean): --  RR: 17 (06-30-23 @ 08:36) (16 - 17)  SpO2: 94% (06-30-23 @ 08:36) (94% - 97%)  AVSS    Objective:    Physical Exam:  General: Pt Alert and oriented, NAD  Lumbar DSG C/D/I  Pulses: +2 pedal pulses, wwp toes  Sensation: silt intact bilateral lower extremities, LLE improving   Motor: EHL/FHL/TA/GS         Plan of Care:  A/P: 67yMale POD#1 s/p L5-S1 microdiscectomy 6-29  - afebrile  - Pain Control- methocarbamol 500mg PO Q8h, lyrica 50mg PO TID, tylenol 650mg PO Q6h, Dilaudid 0.5mg Q4h prn breakthrough pain, Oxycodone 5-10mg PO Q4h prn moderate to severe pain  - DVT ppx: scds  - PT, WBS: WBAT  - ambulate with PT as tolerated  - bowel regimen, Is use, PPI  - Dispo- home pending pt clearance      Ortho Pager [phone]
Preop for L5-S1 left microdiscectomy today with Dr. Valderrama   SUBJECTIVE: Patient seen and examined.  Pt upset that breakfast tray was not delivered on time and now he must turn to NPO status. Notes his left leg symptoms (pain, weakness) that he arrived to hospital with are still present but improving after steroids and rest.  Pt requested early anesthesia eval yesterday in anticipation of OR which was completed. 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 Denies chest pain/SOB/dizziness/n/v/HA   Pain well controlled.       OBJECTIVE:   Vital Signs Last 24 Hrs  T(C): 36.5 (2023 08:47), Max: 37 (2023 21:15)  T(F): 97.7 (2023 08:47), Max: 98.6 (2023 21:15)  HR: 82 (2023 08:47) (81 - 96)  BP: 159/90 (2023 08:47) (144/82 - 159/90)  BP(mean): --  RR: 17 (2023 08:47) (16 - 17)  SpO2: 96% (2023 08:47) (96% - 98%)  Parameters below as of 2023 08:47  Patient On (Oxygen Delivery Method): room air    PE:   Comfortable, NAD, alert and oriented, lying in bed, disheveled          Sensation: intact to light touch to patient's baseline- decreased on left lower extremity compared to right, which is patients baseline and improving since ER per pt          Motor exam:  ehl/ta 2/5 LLE, gs/fhl 4/5 LLE, ehl/ta/gs/fhl 5/5 RLE          Skin warm, well-perfused; capillary refill brisk BLE. Negative kenia bilaterally.              LABS:             15.9   6.43  )-----------( 197      ( 2023 14:23 )             46.7       139  |  104  |  9   ----------------------------<  128<H>  3.9   |  25  |  0.88    Ca    8.9      2023 14:23  PT/INR - ( 2023 14:23 )   PT: 11.5 sec;   INR: 0.97     PTT - ( 2023 14:23 )  PTT:26.1 sec  Urinalysis Basic - ( 2023 10:22 )  Color: Yellow / Appearance: Clear / S.010 / pH: x  Gluc: x / Ketone: NEGATIVE  / Bili: Negative / Urobili: 0.2 E.U./dL   Blood: x / Protein: NEGATIVE mg/dL / Nitrite: NEGATIVE   Leuk Esterase: NEGATIVE / RBC: x / WBC x   Sq Epi: x / Non Sq Epi: x / Bacteria: x    ASSESSMENT AND PLAN:  Preop for L5-S1 microdiscectomy today   1. NPO for surgery. Medically optimized by Medicine team. Pt understands important of NPO status as OR might get moved up.   - Pt informed his anesthesiologist will speak to him prior to case to discuss new concerns, as they are in surgery currently.   - Nursing team made aware of pt request for honey and lozenges for throat when he returns to floor post operatively   2. Analgesic pain control  3. DVT prophylaxis: SCDs   4. Weight Bearing Status:  WBAT   5. Disposition: pending OR 
Ortho Note    Pt comfortable without complaints, pain controlled  Denies CP, SOB, N/V, numbness/tingling     Vital Signs Last 24 Hrs  T(C): 36.5 (06-29-23 @ 08:47), Max: 36.5 (06-29-23 @ 08:47)  T(F): 97.7 (06-29-23 @ 08:47), Max: 97.7 (06-29-23 @ 08:47)  HR: 82 (06-29-23 @ 08:47) (81 - 82)  BP: 159/90 (06-29-23 @ 08:47) (144/82 - 159/90)  BP(mean): --  RR: 17 (06-29-23 @ 08:47) (16 - 17)  SpO2: 96% (06-29-23 @ 08:47) (96% - 97%)  I&O's Summary    28 Jun 2023 07:01  -  29 Jun 2023 07:00  --------------------------------------------------------  IN: 0 mL / OUT: 600 mL / NET: -600 mL    AFVSS  PE  General: Pt Alert and oriented, NAD  Pulses: + 2DP palpable b/l, cap refill brisk  Sensation: decreased sensations in left foot, ankle, and lower leg below the knee, sensations intact to light touch above the knee  Motor: LLE TA/EHL 4/5 GS/FHL 3/5 RLE TA/GS/EHl/FHL 5/5                          15.9   6.43  )-----------( 197      ( 27 Jun 2023 14:23 )             46.7     06-27    139  |  104  |  9   ----------------------------<  128<H>  3.9   |  25  |  0.88    Ca    8.9      27 Jun 2023 14:23        A/P: 68yo Male for OR on Thursday (6/29/23) with Dr. Valderrama for L L5-S1 microdiscectomy     - NPO  - Stable  - Pain Control as needed  - DVT ppx: scds  - PT, WBS: wbat  - Medical clearance from Dr. Snow    Ortho Pager [phone]  
Ortho Note    Pt comfortable without complaints, pain controlled. Hasnt had bm in 4 days, had flatus yesterday. requesting magnesium citrate  Denies CP, SOB, N/V, new numbness/tingling     Vital Signs Last 24 Hrs  T(C): 36.7 (06-30-23 @ 06:10), Max: 36.7 (06-30-23 @ 00:41)  T(F): 98.1 (06-30-23 @ 06:10), Max: 98.1 (06-30-23 @ 06:10)  HR: 88 (06-30-23 @ 06:10) (70 - 88)  BP: 130/73 (06-30-23 @ 06:10) (130/73 - 132/80)  BP(mean): --  RR: 16 (06-30-23 @ 06:10) (16 - 17)  SpO2: 97% (06-30-23 @ 06:10) (95% - 97%)  I&O's Summary    28 Jun 2023 07:01  -  29 Jun 2023 07:00  --------------------------------------------------------  IN: 0 mL / OUT: 600 mL / NET: -600 mL    29 Jun 2023 07:01  -  30 Jun 2023 06:27  --------------------------------------------------------  IN: 400 mL / OUT: 1950 mL / NET: -1550 mL        PE  General: Pt Alert and oriented, NAD  Pulses: + 2DP palpable b/l, cap refill brisk  Sensation: decreased sensations in left foot, ankle, and lower leg including proximal thigh compared to R  Motor: LLE TA/EHL 3/5, GS/FHL 4/5, hip flexor 5/5, hip extension 4/5 L side RLE TA/GS/EHl/FHL 5/5          A/P: 67yMale s/p l5/s1 microdiscectomy 6/29  - Stable  - Pain Control  -Abx ancef  - DVT ppx: scds  - PT, WBS: wbat  - Dispo: pend PT clearance    Ortho Pager [phone]
Ortho Note    Pt seen and examined   Pt comfortable without complaints, pain controlled. He says that he is able to move his left leg more with his pain controlled.  Pt requesting to speak with someone from the anesthesia team regarding his procedure tomorrow.   Denies CP, SOB, N/V, new numbness/tingling.     Vital Signs Last 24 Hrs  T(C): 36.7 (06-28-23 @ 08:18), Max: 36.7 (06-28-23 @ 08:18)  T(F): 98 (06-28-23 @ 08:18), Max: 98 (06-28-23 @ 08:18)  HR: 76 (06-28-23 @ 08:18) (76 - 87)  BP: 140/89 (06-28-23 @ 08:18) (140/89 - 143/87)  BP(mean): 106 (06-28-23 @ 08:18) (106 - 106)  RR: 17 (06-28-23 @ 08:18) (16 - 17)  SpO2: 95% (06-28-23 @ 08:18) (95% - 95%)  I&O's Summary    27 Jun 2023 07:01  -  28 Jun 2023 07:00  --------------------------------------------------------  IN: 0 mL / OUT: 900 mL / NET: -900 mL    PE  General: Pt Alert and oriented, NAD  Pulses: + 2DP palpable b/l, cap refill brisk  Sensation: decreased sensations in left foot, ankle, and lower leg below the knee, sensations intact to light touch above the knee  Motor: LLE TA/EHL 4/5 GS/FHL 3/5 RLE TA/GS/EHl/FHL 5/5               15.9   6.43  )-----------( 197      ( 27 Jun 2023 14:23 )             46.7     06-27    139  |  104  |  9   ----------------------------<  128<H>  3.9   |  25  |  0.88    Ca    8.9      27 Jun 2023 14:23    A/P: 66yo Male for OR on Thursday (6/29/23) with Dr. Valderrama for L L5-S1 microdiscectomy   - Stable  - Pain Control as needed  - DVT ppx: scds  - PT, WBS: wbat  - Diet: regular, NPO after 8am on 6/29  - Medical clearance from Dr. Snow: EKG ordered, Preop Labs completed    Ortho Pager [phone]
Orthopaedic Surgery POC Note    Procedure: L5-S1 microdisc  Surgeon:  Lavelle    Pt complaining of incisional pain, states taht strength and sensation in LE's are improving  Denies CP, SOB, N/V, numbness/tingling     Vital Signs Last 24 Hrs  T(C): 36.5 (06-29-23 @ 20:16), Max: 36.7 (06-29-23 @ 18:51)  T(F): 97.7 (06-29-23 @ 20:16), Max: 98.1 (06-29-23 @ 18:51)  HR: 78 (06-29-23 @ 20:16) (76 - 88)  BP: 129/76 (06-29-23 @ 20:16) (110/65 - 129/76)  BP(mean): 96 (06-29-23 @ 20:16) (77 - 96)  RR: 18 (06-29-23 @ 20:16) (10 - 20)  SpO2: 97% (06-29-23 @ 20:16) (91% - 98%)  I&O's Summary    28 Jun 2023 07:01 - 29 Jun 2023 07:00  --------------------------------------------------------  IN: 0 mL / OUT: 600 mL / NET: -600 mL    29 Jun 2023 07:01 - 29 Jun 2023 22:23  --------------------------------------------------------  IN: 400 mL / OUT: 1350 mL / NET: -950 mL        Physical Exam:  General: Pt Alert and oriented, NAD  DSG C/D/I  Vascular: No edema, calf tenderness, wwp, peripheral pulses +2 bilaterally  Sensation: SILT in L2-S1 dermatomes bilaterally  Motor:   RLE:  Q 5/5, HS 5/5, TA 5/5, GS 5/5, EHL 5/5, FHL 5/5  LLE:  Q 5/5, HS 5/5, TA 5/5, GS 5/5, EHL 5/5, FHL 5/5              A/P: 67yMale POD#0 s/p L5-S1 microdisc  - Stable  - Pain Control  - continue to monitor drain output  - ABX: periop ancef  - DVT ppx: SCDs  - PT, WBS: WBAT  - Dispo: home POD1 following PT    Saturnino Marshall, PGY-2  Ortho Pager [phone]
INTERVAL HPI/OVERNIGHT EVENTS: natanael o/n    SUBJECTIVE: Patient seen and examined at bedside.   Overall doing well. Feels LLE pain and weakness is slightly improved. Wishes to take a shower prior to surgery. Denies any fever, chills, sweats, chest pain, dyspnea. Denies any issues w bowel or urinary incontinence    OBJECTIVE:    VITAL SIGNS:  ICU Vital Signs Last 24 Hrs  T(C): 36.5 (2023 08:47), Max: 37 (2023 21:15)  T(F): 97.7 (2023 08:47), Max: 98.6 (2023 21:15)  HR: 82 (2023 08:47) (81 - 96)  BP: 159/90 (2023 08:47) (144/82 - 159/90)  BP(mean): --  ABP: --  ABP(mean): --  RR: 17 (2023 08:47) (16 - 17)  SpO2: 96% (2023 08:47) (96% - 98%)    O2 Parameters below as of 2023 08:47  Patient On (Oxygen Delivery Method): room air               @ 07: @ 07:00  --------------------------------------------------------  IN: 0 mL / OUT: 600 mL / NET: -600 mL     @ 07: @ 13:25  --------------------------------------------------------  IN: 0 mL / OUT: 1050 mL / NET: -1050 mL      CAPILLARY BLOOD GLUCOSE          PHYSICAL EXAM:  GEN: Male in NAD on RA  HEENT: NC/AT, MMM  CV: RRR, nml S1S2, no murmurs  PULM: nml effort, CTAB  ABD: Soft, non-distended, NABS, non-tender  NEURO  A/O x3, moving all extremities, decreased sensation in LLE from knee distally. 3+/5 in plantarflex/ext on LEFT side. 5/5 on R side.    PSYCH: Appropriate    MEDICATIONS:  MEDICATIONS  (STANDING):  acetaminophen     Tablet .. 1000 milliGRAM(s) Oral once  amLODIPine   Tablet 10 milliGRAM(s) Oral daily  aprepitant 40 milliGRAM(s) Oral once  chlorhexidine 2% Cloths 1 Application(s) Topical once  lactated ringers. 1000 milliLiter(s) (100 mL/Hr) IV Continuous <Continuous>  levothyroxine 88 MICROGram(s) Oral daily  povidone iodine 5% Nasal Swab 1 Application(s) Both Nostrils once  senna 2 Tablet(s) Oral at bedtime  tamsulosin 0.4 milliGRAM(s) Oral at bedtime    MEDICATIONS  (PRN):  acetaminophen     Tablet .. 650 milliGRAM(s) Oral every 6 hours PRN Temp greater or equal to 38C (100.4F), Mild Pain (1 - 3)  HYDROmorphone  Injectable 0.5 milliGRAM(s) IV Push every 4 hours PRN breakthorugh pain  oxyCODONE    IR 10 milliGRAM(s) Oral every 4 hours PRN Severe Pain (7 - 10)  oxyCODONE    IR 5 milliGRAM(s) Oral every 4 hours PRN Moderate Pain (4 - 6)  zolpidem 5 milliGRAM(s) Oral at bedtime PRN Insomnia      ALLERGIES:  Allergies    No Known Allergies    Intolerances        LABS:                        15.9   6.43  )-----------( 197      ( 2023 14:23 )             46.7         139  |  104  |  9   ----------------------------<  128<H>  3.9   |  25  |  0.88    Ca    8.9      2023 14:23      PT/INR - ( 2023 14:23 )   PT: 11.5 sec;   INR: 0.97          PTT - ( 2023 14:23 )  PTT:26.1 sec  Urinalysis Basic - ( 2023 10:22 )    Color: Yellow / Appearance: Clear / S.010 / pH: x  Gluc: x / Ketone: NEGATIVE  / Bili: Negative / Urobili: 0.2 E.U./dL   Blood: x / Protein: NEGATIVE mg/dL / Nitrite: NEGATIVE   Leuk Esterase: NEGATIVE / RBC: x / WBC x   Sq Epi: x / Non Sq Epi: x / Bacteria: x        RADIOLOGY & ADDITIONAL TESTS: Reviewed.
INTERVAL HPI/OVERNIGHT EVENTS: natanael o/n    SUBJECTIVE: Patient seen and examined at bedside.   Pt feels well overall. Pain is localized around low back incision. States pain in LLE has improved vs prior and strength is also improving. Eating wo N/V/Abd pain. +Flatus wo BM. Voiding wo issues. No chest pain, dyspnea, Lightheadedness, dizziness.    OBJECTIVE:    VITAL SIGNS:  ICU Vital Signs Last 24 Hrs  T(C): 36.8 (30 Jun 2023 08:36), Max: 36.8 (29 Jun 2023 21:27)  T(F): 98.2 (30 Jun 2023 08:36), Max: 98.3 (29 Jun 2023 21:27)  HR: 98 (30 Jun 2023 08:36) (70 - 98)  BP: 128/78 (30 Jun 2023 08:36) (110/65 - 147/86)  BP(mean): 96 (29 Jun 2023 20:16) (77 - 96)  ABP: --  ABP(mean): --  RR: 17 (30 Jun 2023 08:36) (10 - 20)  SpO2: 94% (30 Jun 2023 08:36) (91% - 98%)    O2 Parameters below as of 30 Jun 2023 08:36  Patient On (Oxygen Delivery Method): room air              06-29 @ 07:01  -  06-30 @ 07:00  --------------------------------------------------------  IN: 400 mL / OUT: 1950 mL / NET: -1550 mL      CAPILLARY BLOOD GLUCOSE          PHYSICAL EXAM:  GEN: Male in NAD on RA  HEENT: NC/AT, MMM  CV: RRR, nml S1S2, no murmurs  PULM: nml effort, CTAB  ABD: Soft, non-distended, NABS, non-tender  NEURO  A/O x3, moving all extremities. 4/5 in plantarflex/ext on LEFT side. 5/5 on R side.    PSYCH: Appropriate      MEDICATIONS:  MEDICATIONS  (STANDING):  amLODIPine   Tablet 10 milliGRAM(s) Oral daily  dexAMETHasone  Injectable 6 milliGRAM(s) IV Push every 6 hours  lactated ringers. 1000 milliLiter(s) (150 mL/Hr) IV Continuous <Continuous>  levothyroxine 88 MICROGram(s) Oral daily  methocarbamol 500 milliGRAM(s) Oral every 8 hours  ondansetron   Disintegrating Tablet 4 milliGRAM(s) Oral every 6 hours  polyethylene glycol 3350 17 Gram(s) Oral daily  pregabalin 50 milliGRAM(s) Oral three times a day  senna 2 Tablet(s) Oral at bedtime  tamsulosin 0.4 milliGRAM(s) Oral at bedtime    MEDICATIONS  (PRN):  acetaminophen     Tablet .. 650 milliGRAM(s) Oral every 6 hours PRN Temp greater or equal to 38C (100.4F), Mild Pain (1 - 3)  benzocaine/menthol Lozenge 1 Lozenge Oral four times a day PRN Sore Throat  HYDROmorphone  Injectable 0.5 milliGRAM(s) IV Push every 4 hours PRN breakthorugh pain  oxyCODONE    IR 10 milliGRAM(s) Oral every 4 hours PRN Severe Pain (7 - 10)  oxyCODONE    IR 5 milliGRAM(s) Oral every 4 hours PRN Moderate Pain (4 - 6)  zolpidem 5 milliGRAM(s) Oral at bedtime PRN Insomnia      ALLERGIES:  Allergies    No Known Allergies    Intolerances        LABS:                        15.8   12.81 )-----------( 208      ( 30 Jun 2023 07:30 )             46.7     06-30    134<L>  |  104  |  15  ----------------------------<  164<H>  3.7   |  22  |  0.73    Ca    8.8      30 Jun 2023 07:30        Urinalysis Basic - ( 30 Jun 2023 07:30 )    Color: x / Appearance: x / SG: x / pH: x  Gluc: 164 mg/dL / Ketone: x  / Bili: x / Urobili: x   Blood: x / Protein: x / Nitrite: x   Leuk Esterase: x / RBC: x / WBC x   Sq Epi: x / Non Sq Epi: x / Bacteria: x        RADIOLOGY & ADDITIONAL TESTS: Reviewed.

## 2023-06-30 NOTE — PHYSICAL THERAPY INITIAL EVALUATION ADULT - FOLLOWS COMMANDS/ANSWERS QUESTIONS, REHAB EVAL
Requested medication(s) are due for refill today: Yes  Patient has already received a courtesy refill: No  Other reason request has been forwarded to provider:Requirements not met
100% of the time

## 2023-06-30 NOTE — PROGRESS NOTE ADULT - REASON FOR ADMISSION
left sided lower back pain

## 2023-06-30 NOTE — PHYSICAL THERAPY INITIAL EVALUATION ADULT - ADDITIONAL COMMENTS
Pt currently resides in elevator apt, no KEL. Primarily amb indep w/o AD. Denied falls within past 6 months. Indep w/ showering and dressing tasks prior to [facility] surgery.

## 2023-06-30 NOTE — DISCHARGE NOTE NURSING/CASE MANAGEMENT/SOCIAL WORK - PATIENT PORTAL LINK FT
Subjective   Patient ID: Breonna is a 23 year old female who is accompanied by:no one, patient is here by herself.      23 year old here for [facility]. No medical issues this year  -COVID doing OK with room mates and now expanding social [place].  -Growth is good  -School- Last year of college now. Leaning toward OT. Psych./ Education major Swimming with brother as . Was able to swim during Covid. Academics went well. Enjoying. her swimming. Transitioned well. Balancing OK. Friendships are good. Will take a year off for one year. [employer] to help with transition. Will get some help to reframe the end of swimming.  -Stool- OK  -Meals- wide range of foods, can cook, fridge for room, can cook some foods. Eggs. Water, Milk. No sugar sweetened beverages. Energy good.  Takes MVI once a day- Ate more snacks during Covid.  -sleep- 8-9 hours a night.  Good sleep.  -Swim- in the water all year round. Senior led practices. Season beginning October. Made [employer]  --Vision OK  -Hearing OK  -Dismissed from brace program for her back that was from breathing from on side while swimming. She is completely straight now. Does weight training.   -Brush teeth OK.  -Social- home with family. Swim once a day 5-6 days a week.  Menstrual cycle- had been absent in the past. Saw Miranda Vargas and had normal exam. Recommend she go back to see her. Weight is good. Exercise   -Just lost her cat- Fiona  -No juules, MJ, etoh.   -Sleep is good overall. No snoring.    Additional concerns today: None     Review of Systems   Constitutional: Negative.    HENT: Negative.    Eyes: Negative.    Respiratory: Negative.    Cardiovascular: Negative.    Gastrointestinal: Negative.    Endocrine: Negative.    Genitourinary: Negative.    Musculoskeletal: Negative.    Skin: Negative.    Allergic/Immunologic: Negative.    Neurological: Negative.    Hematological: Negative.    Psychiatric/Behavioral: Negative.    All other systems reviewed and  are negative.      Breonna has no past medical history on file.  Breonna has Chest wall asymmetry; Delayed menarche; and Encounter for routine child health examination without abnormal findings on their problem list.  Breonna has no past surgical history on file.  Her family history is not on file.  Breonna reports that she has never smoked. She has never used smokeless tobacco. She reports that she does not drink alcohol.  Breonna currently has no medications in their medication list.  Breonna   No current outpatient medications on file.     No current facility-administered medications for this visit.     Breonna has No Known Allergies.    Objective   Vitals: /69   Pulse 77   Temp 98.6 °F (37 °C) (Temporal)   Resp 18   Ht 5' 5\" (1.651 m)   Wt 64.5 kg (142 lb 1.6 oz)   BMI 23.65 kg/m²   BSA 1.71 m²   Growth parameters are noted and are appropriate for age.    Physical Exam  Constitutional:       Appearance: She is well-developed.   HENT:      Head: Normocephalic.      Right Ear: External ear normal.      Left Ear: External ear normal.   Eyes:      Conjunctiva/sclera: Conjunctivae normal.      Pupils: Pupils are equal, round, and reactive to light.   Cardiovascular:      Rate and Rhythm: Normal rate and regular rhythm.      Heart sounds: Normal heart sounds.   Pulmonary:      Effort: Pulmonary effort is normal.      Breath sounds: Normal breath sounds.   Abdominal:      General: Bowel sounds are normal.      Palpations: Abdomen is soft.   Musculoskeletal:         General: Normal range of motion.      Cervical back: Neck supple.   Skin:     General: Skin is warm.   Neurological:      Mental Status: She is alert and oriented to person, place, and time.      Deep Tendon Reflexes: Reflexes are normal and symmetric.   Psychiatric:         Behavior: Behavior normal.         Thought Content: Thought content normal.         Judgment: Judgment normal.           Assessment   Problem List Items Addressed This Visit        Health  Encounters    Encounter for routine child health examination without abnormal findings - Primary    Relevant Orders    COMPREHENSIVE METABOLIC PANEL    CBC WITH DIFFERENTIAL    VITAMIN D -25 HYDROXY    LIPID PANEL WITH REFLEX        Immunizations: per orders.  History of previous adverse reactions to immunizations? no  Immunizations given today: Yes - Immunizations given today and vaccine counseling including benefits, risks, and adverse reactions were provided by myself during the visit.  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 Her asymmetry from breathing during swimming has resolved with bracing and new pattern from breathing. Will do labs today. Refer to internist. See gyn again given amenorrhea ( prior labs and US normal; good nutrition; Swimmer)  Follow-up yearly for a well visit, or sooner as needed.   You can access the FollowMyHealth Patient Portal offered by [facility] by registering at the following website: [email or web address]. By joining [employer]’s FollowMyHealth portal, you will also be able to view your health information using other applications (apps) compatible with our system.

## 2023-07-06 ENCOUNTER — APPOINTMENT (OUTPATIENT)
Dept: ORTHOPEDIC SURGERY | Facility: CLINIC | Age: 68
End: 2023-07-06
Payer: MEDICARE

## 2023-07-06 VITALS
SYSTOLIC BLOOD PRESSURE: 143 MMHG | HEIGHT: 72 IN | TEMPERATURE: 98 F | BODY MASS INDEX: 22.08 KG/M2 | DIASTOLIC BLOOD PRESSURE: 81 MMHG | WEIGHT: 163 LBS | HEART RATE: 102 BPM | OXYGEN SATURATION: 98 %

## 2023-07-06 PROCEDURE — 99024 POSTOP FOLLOW-UP VISIT: CPT

## 2023-07-09 NOTE — HISTORY OF PRESENT ILLNESS
[Chills] : no chills [Fever] : no fever [Nausea] : no nausea [Vomiting] : no vomiting [de-identified] : s/p Left L5-S1 microdiscectomy (6/29/23) [de-identified] : 67 year old male s/p Left L5-S1 microdiscectomy (6/29/23). Radicular pain resolved. Numbness and strength improving.  Minimal surgical site pain.   [de-identified] : MSK:\par Lumbar spine:\par incision c/d/i\par \par NEURO EXAM:\par Sensation \par Left L2  -  2/2            \par Left L3  -  2/2\par Left L4  -  2/2\par Left L5  -  2/2\par Left S1  -  1/2\par \par Right L2  -  2/2            \par Right L3  -  2/2\par Right L4  -  2/2\par Right L5  -  2/2\par Right S1  -  2/2\par \par Motor: \par Left L2 (hip flexion)                            5/5                \par Left L3 (knee extension)                   5/5                \par Left L4 (ankle dorsiflexion)                 5/5                \par Left L5 (long toe extensor)                5/5                \par Left S1 (ankle plantar flexion)           5/5\par \par Right L2 (hip flexion)                            5/5                \par Right L3 (knee extension)                   5/5                \par Right L4 (ankle dorsiflexion)                 5/5                \par Right L5 (long toe extensor)                5/5               \par Right S1 (ankle plantar flexion)           5/5 [de-identified] : Radicular pain resolved.  Numbness and strength improving.  Otherwise neurologically intact.  Continue to keep dressing intact.  Continue no lifting > 10 lbs, twisting or bending.  FOllowup in 2 weeks. We discussed red flag symptoms that would require emergent evaluation. He knows to call with any questions or concerns or if his symptoms acutely worsen. [de-identified] : 67 year old male s/p Left L5-S1 microdiscectomy (6/29/23).

## 2023-07-10 DIAGNOSIS — Z79.899 OTHER LONG TERM (CURRENT) DRUG THERAPY: ICD-10-CM

## 2023-07-10 DIAGNOSIS — M21.372 FOOT DROP, LEFT FOOT: ICD-10-CM

## 2023-07-10 DIAGNOSIS — T38.0X5A ADVERSE EFFECT OF GLUCOCORTICOIDS AND SYNTHETIC ANALOGUES, INITIAL ENCOUNTER: ICD-10-CM

## 2023-07-10 DIAGNOSIS — E03.9 HYPOTHYROIDISM, UNSPECIFIED: ICD-10-CM

## 2023-07-10 DIAGNOSIS — N40.0 BENIGN PROSTATIC HYPERPLASIA WITHOUT LOWER URINARY TRACT SYMPTOMS: ICD-10-CM

## 2023-07-10 DIAGNOSIS — D72.829 ELEVATED WHITE BLOOD CELL COUNT, UNSPECIFIED: ICD-10-CM
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
## 2023-07-10 DIAGNOSIS — I10 ESSENTIAL (PRIMARY) HYPERTENSION: ICD-10-CM

## 2023-07-10 DIAGNOSIS — Z79.890 HORMONE REPLACEMENT THERAPY: ICD-10-CM

## 2023-07-10 DIAGNOSIS — M51.26 OTHER INTERVERTEBRAL DISC DISPLACEMENT, LUMBAR REGION: ICD-10-CM

## 2023-07-10 DIAGNOSIS — M51.17 INTERVERTEBRAL DISC DISORDERS WITH RADICULOPATHY, LUMBOSACRAL REGION: ICD-10-CM
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
## 2023-07-11 LAB — SURGICAL PATHOLOGY STUDY: SIGNIFICANT CHANGE UP

## 2023-07-20 ENCOUNTER — APPOINTMENT (OUTPATIENT)
Dept: ORTHOPEDIC SURGERY | Facility: CLINIC | Age: 68
End: 2023-07-20

## 2023-07-21 NOTE — HISTORY OF PRESENT ILLNESS
[de-identified] : 67M approximately 3 weeks out from L L5-S1 microdiscectomy presenting for wound check. He states he has noticed swelling at the site of incision but denies any drainage, fever, chills, or other systemic symptoms. States he feels some symptoms that were present preoperatively and some residual weakness of LLE but states they are significantly better compared to preop.  [de-identified] : Incision: well-healing without erythema, warmth, or drainage, however some fluctuance present underneath incision. Area not tender to palpation.\par \par RLE: hip flexor, knee extension, TA/EHL/FHL/GS 5/5. Able to perform heel rise without difficulty. SILT throughout.\par LLE: hip flexor, knee extension 5/5. TA 4+/5, EHL 4/5, FHL 4/5, GS 4/5. Unable to perform single heel rise. SILT, though diminished in L5, S1 distributions [de-identified] : - Continue covering incision\par - Encouraged rest; no strenuous activity, heavy lifting, etc\par - No indication for repeat MRI at this time\par - F/u in 2 weeks or sooner for any new or worsening symptoms  [de-identified] : 67M approx 3 weeks postop L5-S1 microdisc, overall doing well. Incision appears to be healing with apparent subcutaneous hematoma vs seroma, no evidence of infection. Symptoms overall improved compared to preop. No acute interventions or further imaging indicated at this time

## 2023-07-25 ENCOUNTER — NON-APPOINTMENT (OUTPATIENT)
Age: 68
End: 2023-07-25

## 2023-07-25 ENCOUNTER — APPOINTMENT (OUTPATIENT)
Dept: INTERNAL MEDICINE | Facility: CLINIC | Age: 68
End: 2023-07-25
Payer: MEDICARE

## 2023-07-25 VITALS
SYSTOLIC BLOOD PRESSURE: 136 MMHG | TEMPERATURE: 97.2 F | HEIGHT: 60 IN | DIASTOLIC BLOOD PRESSURE: 74 MMHG | HEART RATE: 97 BPM | WEIGHT: 160 LBS | BODY MASS INDEX: 31.41 KG/M2 | OXYGEN SATURATION: 98 %

## 2023-07-25 DIAGNOSIS — R73.03 PREDIABETES.: ICD-10-CM

## 2023-07-25 DIAGNOSIS — N20.0 CALCULUS OF KIDNEY: ICD-10-CM

## 2023-07-25 DIAGNOSIS — Z00.00 ENCOUNTER FOR GENERAL ADULT MEDICAL EXAMINATION W/OUT ABNORMAL FINDINGS: ICD-10-CM

## 2023-07-25 DIAGNOSIS — Z82.49 FAMILY HISTORY OF ISCHEMIC HEART DISEASE AND OTHER DISEASES OF THE CIRCULATORY SYSTEM: ICD-10-CM

## 2023-07-25 PROCEDURE — G0296 VISIT TO DETERM LDCT ELIG: CPT

## 2023-07-25 PROCEDURE — 93000 ELECTROCARDIOGRAM COMPLETE: CPT

## 2023-07-25 PROCEDURE — G0438: CPT

## 2023-07-25 RX ORDER — DEXAMETHASONE 4 MG/1
4 TABLET ORAL
Qty: 22 | Refills: 0 | Status: DISCONTINUED | COMMUNITY
Start: 2023-05-24 | End: 2023-07-25

## 2023-07-25 RX ORDER — HYDROMORPHONE HYDROCHLORIDE 2 MG/1
2 TABLET ORAL
Qty: 30 | Refills: 0 | Status: DISCONTINUED | COMMUNITY
Start: 2020-10-09 | End: 2023-07-25

## 2023-07-25 RX ORDER — DIAZEPAM 2 MG/1
2 TABLET ORAL
Qty: 5 | Refills: 0 | Status: DISCONTINUED | COMMUNITY
Start: 2017-08-16 | End: 2023-07-25

## 2023-07-25 RX ORDER — METFORMIN HYDROCHLORIDE 625 MG/1
TABLET ORAL
Refills: 0 | Status: ACTIVE | COMMUNITY

## 2023-07-25 RX ORDER — METHOCARBAMOL 750 MG/1
750 TABLET, FILM COATED ORAL EVERY 8 HOURS
Qty: 40 | Refills: 0 | Status: DISCONTINUED | COMMUNITY
Start: 2023-06-13 | End: 2023-07-25

## 2023-07-25 NOTE — ASSESSMENT
[FreeTextEntry1] : #HCM\par - discussed healthy diet and exercise\par - discussed age appropriate cancer screenings and vaccines \par - ECG done today, he will go to the labs for blood work\par - declines all vaccines\par - GI referral for colonoscopy

## 2023-07-25 NOTE — HEALTH RISK ASSESSMENT
[Patient unable to screen] : Patient unable to screen [No] : In the past 12 months have you used drugs other than those required for medical reasons? No [0] : 2) Feeling down, depressed, or hopeless: Not at all (0) [PHQ-2 Negative - No further assessment needed] : PHQ-2 Negative - No further assessment needed [Fully functional (bathing, dressing, toileting, transferring, walking, feeding)] : Fully functional (bathing, dressing, toileting, transferring, walking, feeding) [Fully functional (using the telephone, shopping, preparing meals, housekeeping, doing laundry, using] : Fully functional and needs no help or supervision to perform IADLs (using the telephone, shopping, preparing meals, housekeeping, doing laundry, using transportation, managing medications and managing finances) [de-identified] : exercising, but limited by prior patella fracture and sciatica  [de-identified] : healthy diet  [[id number]] : 0 [Reports changes in hearing] : Reports no changes in hearing [Reports changes in vision] : Reports no changes in vision [Reports changes in dental health] : Reports no changes in dental health [de-identified] : patient declines to have answered documented

## 2023-07-25 NOTE — PHYSICAL EXAM
[No Edema] : there was no peripheral edema [Normal] : affect was normal and insight and judgment were intact [de-identified] : incision well healing. no erythema. +SQ fluid

## 2023-07-25 NOTE — HISTORY OF PRESENT ILLNESS
[de-identified] : Mr. PATY VIVAS is a 67 year old male with history of BPH, HTN, prediabetes and hypothyroidism presenting today to establish care. He was recently admitted for L5-S1 microdiscectomy. He has been feeling better since having surgery, but is not back to normal yet.

## 2023-07-29 ENCOUNTER — EMERGENCY (EMERGENCY)
Facility: HOSPITAL | Age: 68
LOS: 1 days | Discharge: HOME | End: 2023-07-29
Attending: EMERGENCY MEDICINE | Admitting: EMERGENCY MEDICINE
Payer: MEDICARE

## 2023-07-29 VITALS
DIASTOLIC BLOOD PRESSURE: 82 MMHG | SYSTOLIC BLOOD PRESSURE: 158 MMHG | WEIGHT: 160.06 LBS | TEMPERATURE: 99 F | HEIGHT: 72 IN | OXYGEN SATURATION: 96 % | HEART RATE: 99 BPM | RESPIRATION RATE: 18 BRPM

## 2023-07-29 VITALS
RESPIRATION RATE: 17 BRPM | DIASTOLIC BLOOD PRESSURE: 81 MMHG | OXYGEN SATURATION: 96 % | HEART RATE: 85 BPM | SYSTOLIC BLOOD PRESSURE: 149 MMHG | TEMPERATURE: 99 F

## 2023-07-29 DIAGNOSIS — Z90.89 ACQUIRED ABSENCE OF OTHER ORGANS: Chronic | ICD-10-CM

## 2023-07-29 DIAGNOSIS — D72.829 ELEVATED WHITE BLOOD CELL COUNT, UNSPECIFIED: ICD-10-CM

## 2023-07-29 DIAGNOSIS — M54.50 LOW BACK PAIN, UNSPECIFIED: ICD-10-CM

## 2023-07-29 DIAGNOSIS — Z87.442 PERSONAL HISTORY OF URINARY CALCULI: ICD-10-CM

## 2023-07-29 DIAGNOSIS — E03.9 HYPOTHYROIDISM, UNSPECIFIED: ICD-10-CM

## 2023-07-29 DIAGNOSIS — Z87.39 PERSONAL HISTORY OF OTHER DISEASES OF THE MUSCULOSKELETAL SYSTEM AND CONNECTIVE TISSUE: ICD-10-CM

## 2023-07-29 DIAGNOSIS — R22.2 LOCALIZED SWELLING, MASS AND LUMP, TRUNK: ICD-10-CM

## 2023-07-29 DIAGNOSIS — S82.891A OTHER FRACTURE OF RIGHT LOWER LEG, INITIAL ENCOUNTER FOR CLOSED FRACTURE: Chronic | ICD-10-CM

## 2023-07-29 DIAGNOSIS — I10 ESSENTIAL (PRIMARY) HYPERTENSION: ICD-10-CM

## 2023-07-29 LAB
ANION GAP SERPL CALC-SCNC: 13 MMOL/L — SIGNIFICANT CHANGE UP (ref 5–17)
BASOPHILS # BLD AUTO: 0.03 K/UL — SIGNIFICANT CHANGE UP (ref 0–0.2)
BASOPHILS NFR BLD AUTO: 0.2 % — SIGNIFICANT CHANGE UP (ref 0–2)
BUN SERPL-MCNC: 8 MG/DL — SIGNIFICANT CHANGE UP (ref 7–23)
CALCIUM SERPL-MCNC: 8.6 MG/DL — SIGNIFICANT CHANGE UP (ref 8.4–10.5)
CHLORIDE SERPL-SCNC: 105 MMOL/L — SIGNIFICANT CHANGE UP (ref 96–108)
CO2 SERPL-SCNC: 20 MMOL/L — LOW (ref 22–31)
CREAT SERPL-MCNC: 0.82 MG/DL — SIGNIFICANT CHANGE UP (ref 0.5–1.3)
EGFR: 97 ML/MIN/1.73M2 — SIGNIFICANT CHANGE UP
EOSINOPHIL # BLD AUTO: 0.07 K/UL — SIGNIFICANT CHANGE UP (ref 0–0.5)
EOSINOPHIL NFR BLD AUTO: 0.6 % — SIGNIFICANT CHANGE UP (ref 0–6)
GLUCOSE SERPL-MCNC: 115 MG/DL — HIGH (ref 70–99)
HCT VFR BLD CALC: 47.8 % — SIGNIFICANT CHANGE UP (ref 39–50)
HGB BLD-MCNC: 16 G/DL — SIGNIFICANT CHANGE UP (ref 13–17)
IMM GRANULOCYTES NFR BLD AUTO: 0.5 % — SIGNIFICANT CHANGE UP (ref 0–0.9)
LYMPHOCYTES # BLD AUTO: 0.99 K/UL — LOW (ref 1–3.3)
LYMPHOCYTES # BLD AUTO: 8.2 % — LOW (ref 13–44)
MCHC RBC-ENTMCNC: 32.2 PG — SIGNIFICANT CHANGE UP (ref 27–34)
MCHC RBC-ENTMCNC: 33.5 GM/DL — SIGNIFICANT CHANGE UP (ref 32–36)
MCV RBC AUTO: 96.2 FL — SIGNIFICANT CHANGE UP (ref 80–100)
MONOCYTES # BLD AUTO: 1.12 K/UL — HIGH (ref 0–0.9)
MONOCYTES NFR BLD AUTO: 9.2 % — SIGNIFICANT CHANGE UP (ref 2–14)
NEUTROPHILS # BLD AUTO: 9.86 K/UL — HIGH (ref 1.8–7.4)
NEUTROPHILS NFR BLD AUTO: 81.3 % — HIGH (ref 43–77)
NRBC # BLD: 0 /100 WBCS — SIGNIFICANT CHANGE UP (ref 0–0)
PLATELET # BLD AUTO: 175 K/UL — SIGNIFICANT CHANGE UP (ref 150–400)
POTASSIUM SERPL-MCNC: 3.8 MMOL/L — SIGNIFICANT CHANGE UP (ref 3.5–5.3)
POTASSIUM SERPL-SCNC: 3.8 MMOL/L — SIGNIFICANT CHANGE UP (ref 3.5–5.3)
RBC # BLD: 4.97 M/UL — SIGNIFICANT CHANGE UP (ref 4.2–5.8)
RBC # FLD: 13.9 % — SIGNIFICANT CHANGE UP (ref 10.3–14.5)
SODIUM SERPL-SCNC: 138 MMOL/L — SIGNIFICANT CHANGE UP (ref 135–145)
WBC # BLD: 12.13 K/UL — HIGH (ref 3.8–10.5)
WBC # FLD AUTO: 12.13 K/UL — HIGH (ref 3.8–10.5)

## 2023-07-29 PROCEDURE — 72100 X-RAY EXAM L-S SPINE 2/3 VWS: CPT | Mod: 26

## 2023-07-29 PROCEDURE — 36415 COLL VENOUS BLD VENIPUNCTURE: CPT

## 2023-07-29 PROCEDURE — 80048 BASIC METABOLIC PNL TOTAL CA: CPT

## 2023-07-29 PROCEDURE — 99284 EMERGENCY DEPT VISIT MOD MDM: CPT | Mod: 25

## 2023-07-29 PROCEDURE — 72100 X-RAY EXAM L-S SPINE 2/3 VWS: CPT

## 2023-07-29 PROCEDURE — 99285 EMERGENCY DEPT VISIT HI MDM: CPT

## 2023-07-29 PROCEDURE — 96374 THER/PROPH/DIAG INJ IV PUSH: CPT

## 2023-07-29 PROCEDURE — 85025 COMPLETE CBC W/AUTO DIFF WBC: CPT

## 2023-07-29 RX ORDER — OXYCODONE AND ACETAMINOPHEN 5; 325 MG/1; MG/1
1 TABLET ORAL
Qty: 12 | Refills: 0
Start: 2023-07-29 | End: 2023-07-31

## 2023-07-29 RX ORDER — CEPHALEXIN 500 MG
1 CAPSULE ORAL
Qty: 28 | Refills: 0
Start: 2023-07-29 | End: 2023-08-04

## 2023-07-29 RX ORDER — KETOROLAC TROMETHAMINE 30 MG/ML
15 SYRINGE (ML) INJECTION ONCE
Refills: 0 | Status: DISCONTINUED | OUTPATIENT
Start: 2023-07-29 | End: 2023-07-29

## 2023-07-29 RX ADMIN — Medication 15 MILLIGRAM(S): at 10:58

## 2023-07-29 RX ADMIN — Medication 15 MILLIGRAM(S): at 10:28

## 2023-07-29 NOTE — ED ADULT NURSE NOTE - CHIEF COMPLAINT QUOTE
Wound check, lower mid back. swollen and painful x 1 day. States finished course of antibiotics post surgery

## 2023-07-29 NOTE — ED PROVIDER NOTE - CARE PROVIDER_API CALL
Nelson Valderrama Justus  Orthopaedic Surgery  [address] [place] [address], [address]  [place] [address]  Phone: [phone]  Fax: [phone]  Follow Up Time:

## 2023-07-29 NOTE — ED ADULT TRIAGE NOTE - CHIEF COMPLAINT QUOTE
Wound check, lower mid back. swollen and painful x 1 day. States finished course of antibiotics post surgery Lower mid back pain, swollen and painful x 1 day.

## 2023-07-29 NOTE — ED PROVIDER NOTE - PATIENT PORTAL LINK FT
You can access the FollowMyHealth Patient Portal offered by [facility] by registering at the following website: [email or web address]. By joining [email or web address]’s FollowMyHealth portal, you will also be able to view your health information using other applications (apps) compatible with our system.

## 2023-07-29 NOTE — ED ADULT NURSE NOTE - NSFALLUNIVINTERV_ED_ALL_ED
Bed/Stretcher in lowest position, wheels locked, appropriate side rails in place/Call bell, personal items and telephone in reach/Instruct patient to call for assistance before getting out of bed/chair/stretcher/Non-slip footwear applied when patient is off stretcher/[place] to call system/Physically safe environment - no spills, clutter or unnecessary equipment/Purposeful proactive rounding/Room/bathroom lighting operational, light cord in reach

## 2023-07-29 NOTE — ED PROVIDER NOTE - OBJECTIVE STATEMENT
66 yo m with pmh of htn, hypothyroidism, s/p L5S1 micro dissectomy on 6/29 c/o low back pain and swelling near the incision site since last night. Denies fever, chills. Denies new trauma. Pt saw his surgeon last week and everything was healing well.

## 2023-07-29 NOTE — ED ADULT NURSE NOTE - OBJECTIVE STATEMENT
68 y/o M with pmhx L-spine ruptured disc s/p surgical repair 1 month ago presents to the ED c/o incisional swelling and pain since yesterday morning. States pain kept him up last night and is now causing LLE sciatica. Denies taking pain medication today. States he was unable to get his surgeon on the phone due to the weekend so he just came straight here. Denies fever/chills, palpitations, nausea, diaphoresis, weakness, and any other complaints at this time. On exam, A&Ox4, NAD, on RA. L-spine incision healing with scab, open to air, surrounding erythema and edema localized, TTP.

## 2023-07-29 NOTE — ED PROVIDER NOTE - ATTENDING APP SHARED VISIT CONTRIBUTION OF CARE
68 yo m with pmh of htn, hypothyroidism, s/p L5S1 micro dissectomy on 6/29 c/o low back pain and swelling near the incision site since last night. Denies fever, chills. Denies new trauma. Pt saw his surgeon last week and everything was healing well.     Afebrile. low back- 3x5 area of swelling and  tenderness over incision site, no erythema, warmth or drainage. XR neg. Labs with elevated wbcs but similar to previously. seen by ortho, recommending medrol dosepack    Agree with above note as documented by PA.  I was available as the supervising attending during patient's ER evaluation.

## 2023-07-29 NOTE — ED PROVIDER NOTE - NSFOLLOWUPINSTRUCTIONS_ED_ALL_ED_FT
Follow up with Dr. Valderrama   Take antibiotics as prescribed   Return to ED for worsening pain, fever, chills, swelling, redness, drainage or any other  concerning symptoms     Can take robaxin (methocarbamol) as prescribed as needed for pain/spasm.    Return to ER immediately for fevers, persistent vomit, uncontrolled pain, incontinence, focal weakness/numbness, worsening dizziness.   [email or web address]    Back Pain    Back pain is very common in adults. The cause of back pain is rarely dangerous and the pain often gets better over time. The cause of your back pain may not be known and may include strain of muscles or ligaments, degeneration of the spinal disks, or arthritis. Occasionally the pain may radiate down your leg(s). Over-the-counter medicines to reduce pain and inflammation are often the most helpful. Stretching and remaining active frequently helps the healing process.     SEEK IMMEDIATE MEDICAL CARE IF YOU HAVE ANY OF THE FOLLOWING SYMPTOMS: bowel or bladder control problems, unusual weakness or numbness in your arms or legs, nausea or vomiting, abdominal pain, fever, dizziness/lightheadedness. Follow up with Dr. Valderrama   Take steroids as prescribed   Return to ED for worsening pain, fever, chills, swelling, redness, drainage or any other  concerning symptoms       Return to ER immediately for fevers, persistent vomit, uncontrolled pain, incontinence, focal weakness/numbness, worsening dizziness.   [email or web address]    Back Pain    Back pain is very common in adults. The cause of back pain is rarely dangerous and the pain often gets better over time. The cause of your back pain may not be known and may include strain of muscles or ligaments, degeneration of the spinal disks, or arthritis. Occasionally the pain may radiate down your leg(s). Over-the-counter medicines to reduce pain and inflammation are often the most helpful. Stretching and remaining active frequently helps the healing process.     SEEK IMMEDIATE MEDICAL CARE IF YOU HAVE ANY OF THE FOLLOWING SYMPTOMS: bowel or bladder control problems, unusual weakness or numbness in your arms or legs, nausea or vomiting, abdominal pain, fever, dizziness/lightheadedness.

## 2023-07-29 NOTE — CONSULT NOTE ADULT - SUBJECTIVE AND OBJECTIVE BOX
Orthopaedic Surgery Consult Note    For Surgeon: Dr. Valderrama    HPI:  67yMale  Patient is a 67y old  Male who presents with a chief complaint of   HPI:      Allergies    No Known Allergies    Intolerances      PAST MEDICAL & SURGICAL HISTORY:  Hypertension      Hypothyroid      Kidney stones      S/P tonsillectomy      Ankle fracture, right  5 yrs ago tx @[facility] with plates and rodes        MEDICATIONS  (STANDING):  oxycodone    5 mG/acetaminophen 325 mG 2 Tablet(s) Oral Once    MEDICATIONS  (PRN):      Vital Signs Last 24 Hrs  T(C): 37 (29 Jul 2023 09:57), Max: 37 (29 Jul 2023 09:57)  T(F): 98.6 (29 Jul 2023 09:57), Max: 98.6 (29 Jul 2023 09:57)  HR: 99 (29 Jul 2023 09:57) (99 - 99)  BP: 158/82 (29 Jul 2023 09:57) (158/82 - 158/82)  BP(mean): --  RR: 18 (29 Jul 2023 09:57) (18 - 18)  SpO2: 96% (29 Jul 2023 09:57) (96% - 96%)    Parameters below as of 29 Jul 2023 09:57  Patient On (Oxygen Delivery Method): room air        Physical Exam:              Imaging:     A/P: 67yMale    -Discussed with Dr. Calles Pager [phone]   Orthopaedic Surgery Consult Note    For Surgeon: Dr. Valderrama    HPI:  Patient is a 67y old  Male who presents with a chief complaint of post  op incisional pain and swelling.   Reports that has been going on since after surgery about a month ago but has recently become worse over the past 3 days.     Patient otherwise denies fever, chills, signs of infection.               PAST MEDICAL & SURGICAL HISTORY:  Hypertension      Hypothyroid      Kidney stones      S/P tonsillectomy      Ankle fracture, right  5 yrs ago tx [facility] with plates and rodes        MEDICATIONS  (STANDING):  oxycodone    5 mG/acetaminophen 325 mG 2 Tablet(s) Oral Once    MEDICATIONS  (PRN):      Vital Signs Last 24 Hrs  T(C): 37 (29 Jul 2023 09:57), Max: 37 (29 Jul 2023 09:57)  T(F): 98.6 (29 Jul 2023 09:57), Max: 98.6 (29 Jul 2023 09:57)  HR: 99 (29 Jul 2023 09:57) (99 - 99)  BP: 158/82 (29 Jul 2023 09:57) (158/82 - 158/82)  BP(mean): --  RR: 18 (29 Jul 2023 09:57) (18 - 18)  SpO2: 96% (29 Jul 2023 09:57) (96% - 96%)    Parameters below as of 29 Jul 2023 09:57  Patient On (Oxygen Delivery Method): room air        Physical Exam:              Imaging:     A/P: 67yMale    -Discussed with Dr. Calles Pager [phone]   Orthopaedic Surgery Consult Note    For Surgeon: Dr. Valderrama    HPI:  Patient is a 67y old  Male who presents with a chief complaint of post  op incisional pain and swelling.   Reports that has been going on since after surgery about a month ago but has recently become worse over the past 3 days.     Patient otherwise denies fever, chills, or signs of infection. No new neuro deficits. No recent trauma. Community ambulator at baseline. Able to ambulate since injury just with pain      PMHx - HTN, Hypothyroid, kidney stones,         Vital Signs Last 24 Hrs  T(C): 37 (29 Jul 2023 09:57), Max: 37 (29 Jul 2023 09:57)  T(F): 98.6 (29 Jul 2023 09:57), Max: 98.6 (29 Jul 2023 09:57)  HR: 99 (29 Jul 2023 09:57) (99 - 99)  BP: 158/82 (29 Jul 2023 09:57) (158/82 - 158/82)  BP(mean): --  RR: 18 (29 Jul 2023 09:57) (18 - 18)  SpO2: 96% (29 Jul 2023 09:57) (96% - 96%)    Parameters below as of 29 Jul 2023 09:57  Patient On (Oxygen Delivery Method): room air        Physical Exam:  General: Incision c/d/i. Indurated. No drainage, or erythema  Motor:   LLE TA/EHL 4/5, GS/FHL 4/5, hip flexor 4/5, hip extension 4/5 L side  RLE TA/GS/EHl/FHL 5/5  Sensation: decreased sensations in left foot, ankle, and lower leg including proximal thigh compared to R  Pulses: + 2DP palpable b/l, cap refill brisk        Imaging: Xray with no acute fracture or osseous abnormalituy    A/P: 67yMale s/p L5/S1 discectomy now with post op incisional pain, swelling. Mild swelling. No active signs of infection noted; no drainage, no erythema    No acute Orthopedic surgical intervention at this time  Recommend pain control  WBAT    -Discussed with Dr. Valderrama    Ortho Pager [phone]        *******INCOMPLE*******   Orthopaedic Surgery Consult Note    For Surgeon: Dr. Valderrama    HPI:  Patient is a 67y old  Male who presents with a chief complaint of post  op incisional pain and swelling.   Reports that has been going on since after surgery about a month ago but has recently become worse over the past 3 days.     Patient otherwise denies fever, chills, or signs of infection. No new neuro deficits. No recent trauma. Community ambulator at baseline. Able to ambulate since injury just with pain      PMHx - HTN, Hypothyroid, kidney stones,         Vital Signs Last 24 Hrs  T(C): 37 (29 Jul 2023 09:57), Max: 37 (29 Jul 2023 09:57)  T(F): 98.6 (29 Jul 2023 09:57), Max: 98.6 (29 Jul 2023 09:57)  HR: 99 (29 Jul 2023 09:57) (99 - 99)  BP: 158/82 (29 Jul 2023 09:57) (158/82 - 158/82)  BP(mean): --  RR: 18 (29 Jul 2023 09:57) (18 - 18)  SpO2: 96% (29 Jul 2023 09:57) (96% - 96%)    Parameters below as of 29 Jul 2023 09:57  Patient On (Oxygen Delivery Method): room air        Physical Exam:  General: Incision c/d/i. Indurated. No drainage, or erythema  Motor:   LLE TA/EHL 4/5, GS/FHL 4/5, hip flexor 4/5, hip extension 4/5 L side  RLE TA/GS/EHl/FHL 5/5  Sensation: decreased sensations in left foot, ankle, and lower leg including proximal thigh compared to R  Pulses: + 2DP palpable b/l, cap refill brisk        Imaging: Xray with no acute fracture or osseous abnormality    A/P: 67yMale s/p L5/S1 discectomy now with post op incisional pain, swelling. Mild swelling. No active signs of infection noted; no drainage, no erythema    No acute Orthopedic surgical intervention at this time  Reccomend  -Keflex for 7 daays  - pain control   - muscle relaxers  - Follow up with Dr. Valderrama this thusday  -WBAT    -Discussed with Dr. Valderrama    Ortho Pager [phone]           Orthopaedic Surgery Consult Note    For Surgeon: Dr. Valderrama    HPI:  Patient is a 67y old  Male who presents with a chief complaint of post  op incisional pain and swelling.   Reports that has been going on since after surgery about a month ago but has recently become worse over the past 3 days.     Patient otherwise denies fever, chills, or signs of infection. No new neuro deficits. No recent trauma. Community ambulator at baseline. Able to ambulate since injury just with pain      PMHx - HTN, Hypothyroid, kidney stones,         Vital Signs Last 24 Hrs  T(C): 37 (29 Jul 2023 09:57), Max: 37 (29 Jul 2023 09:57)  T(F): 98.6 (29 Jul 2023 09:57), Max: 98.6 (29 Jul 2023 09:57)  HR: 99 (29 Jul 2023 09:57) (99 - 99)  BP: 158/82 (29 Jul 2023 09:57) (158/82 - 158/82)  BP(mean): --  RR: 18 (29 Jul 2023 09:57) (18 - 18)  SpO2: 96% (29 Jul 2023 09:57) (96% - 96%)    Parameters below as of 29 Jul 2023 09:57  Patient On (Oxygen Delivery Method): room air        Physical Exam:  General: Incision c/d/i. Indurated. No drainage, or erythema  Motor:   LLE TA/EHL 4/5, GS/FHL 4/5, hip flexor 4/5, hip extension 4/5 L side  RLE TA/GS/EHl/FHL 5/5  Sensation: decreased sensations in left foot, ankle, and lower leg including proximal thigh compared to R  Pulses: + 2DP palpable b/l, cap refill brisk        Imaging: Xray with no acute fracture or osseous abnormality    A/P: 67yMale s/p L5/S1 discectomy now with post op incisional pain, swelling. Mild swelling. No active signs of infection noted; no drainage, no erythema    No acute Orthopedic surgical intervention at this time  Recommend  - Steroid dose pack  - pain control (percocet)  - muscle relaxers  - Follow up with Dr. Valderrama this Thursday (appointment already scheduled  - WBAT    -Discussed with Dr. Valderrama    Ortho Pager [phone]

## 2023-07-29 NOTE — ED PROVIDER NOTE - PHYSICAL EXAMINATION
CONSTITUTIONAL: Well-appearing;  in no apparent distress.   HEAD: Normocephalic; atraumatic.   EYES: PERRL; EOM intact; conjunctiva and sclera clear  ENT: normal nose; no rhinorrhea; normal pharynx with no erythema or lesions.   NECK: Supple; non-tender; no LAD  CARDIOVASCULAR: Normal S1, S2; No audible murmurs. Regular rate and rhythm.   RESPIRATORY: Breathing easily; breath sounds clear and equal bilaterally; no wheezes, rhonchi, or rales.  GI: Soft; non-distended; non-tender; no palpable organomegaly.   MSK: FROM at all extremities, normal tone   EXT: No cyanosis or edema; N/V intact  SKIN:  low back- 3x5 area of swelling and  tenderness over incision site, no erythema, warmth or drainage

## 2023-07-29 NOTE — ED PROVIDER NOTE - CLINICAL SUMMARY MEDICAL DECISION MAKING FREE TEXT BOX
68 yo m with pmh of htn, hypothyroidism, s/p L5S1 micro dissectomy on 6/29 c/o low back pain and swelling near the incision site since last night. Denies fever, chills. Afebrile. low back- 3x5 area of swelling and  tenderness over incision site, no erythema, warmth or drainage 66 yo m with pmh of htn, hypothyroidism, s/p L5S1 micro dissectomy on 6/29 c/o low back pain and swelling near the incision site since last night. Denies fever, chills. Afebrile. low back- 3x5 area of swelling and  tenderness over incision site, no erythema, warmth or drainage. XR neg. Labs with elevated wbcs but similar to previously. seen by ortho, recommending keflex 7 days. 66 yo m with pmh of htn, hypothyroidism, s/p L5S1 micro dissectomy on 6/29 c/o low back pain and swelling near the incision site since last night. Denies fever, chills. Afebrile. low back- 3x5 area of swelling and  tenderness over incision site, no erythema, warmth or drainage. XR neg. Labs with elevated wbcs but similar to previously. seen by ortho, recommending medrol dosepack

## 2023-07-31 ENCOUNTER — EMERGENCY (EMERGENCY)
Facility: HOSPITAL | Age: 68
LOS: 1 days | Discharge: HOME | End: 2023-07-31
Attending: STUDENT IN AN ORGANIZED HEALTH CARE EDUCATION/TRAINING PROGRAM | Admitting: STUDENT IN AN ORGANIZED HEALTH CARE EDUCATION/TRAINING PROGRAM
Payer: MEDICARE

## 2023-07-31 VITALS
RESPIRATION RATE: 18 BRPM | DIASTOLIC BLOOD PRESSURE: 80 MMHG | HEART RATE: 73 BPM | TEMPERATURE: 98 F | OXYGEN SATURATION: 98 % | SYSTOLIC BLOOD PRESSURE: 164 MMHG

## 2023-07-31 VITALS
WEIGHT: 160.06 LBS | RESPIRATION RATE: 18 BRPM | TEMPERATURE: 98 F | SYSTOLIC BLOOD PRESSURE: 143 MMHG | DIASTOLIC BLOOD PRESSURE: 86 MMHG | HEIGHT: 72 IN | OXYGEN SATURATION: 98 % | HEART RATE: 93 BPM

## 2023-07-31 DIAGNOSIS — Z87.442 PERSONAL HISTORY OF URINARY CALCULI: ICD-10-CM

## 2023-07-31 DIAGNOSIS — Z87.81 PERSONAL HISTORY OF (HEALED) TRAUMATIC FRACTURE: ICD-10-CM

## 2023-07-31 DIAGNOSIS — Z87.39 PERSONAL HISTORY OF OTHER DISEASES OF THE MUSCULOSKELETAL SYSTEM AND CONNECTIVE TISSUE: ICD-10-CM

## 2023-07-31 DIAGNOSIS — E03.9 HYPOTHYROIDISM, UNSPECIFIED: ICD-10-CM

## 2023-07-31 DIAGNOSIS — S82.891A OTHER FRACTURE OF RIGHT LOWER LEG, INITIAL ENCOUNTER FOR CLOSED FRACTURE: Chronic | ICD-10-CM

## 2023-07-31 DIAGNOSIS — I10 ESSENTIAL (PRIMARY) HYPERTENSION: ICD-10-CM

## 2023-07-31 DIAGNOSIS — M54.31 SCIATICA, RIGHT SIDE: ICD-10-CM

## 2023-07-31 DIAGNOSIS — G89.18 OTHER ACUTE POSTPROCEDURAL PAIN: ICD-10-CM

## 2023-07-31 DIAGNOSIS — M54.32 SCIATICA, LEFT SIDE: ICD-10-CM

## 2023-07-31 DIAGNOSIS — T81.89XA OTHER COMPLICATIONS OF PROCEDURES, NOT ELSEWHERE CLASSIFIED, INITIAL ENCOUNTER: ICD-10-CM

## 2023-07-31 DIAGNOSIS — Z90.09 ACQUIRED ABSENCE OF OTHER PART OF HEAD AND NECK: ICD-10-CM

## 2023-07-31 DIAGNOSIS — Z90.89 ACQUIRED ABSENCE OF OTHER ORGANS: Chronic | ICD-10-CM

## 2023-07-31 PROCEDURE — 99284 EMERGENCY DEPT VISIT MOD MDM: CPT

## 2023-07-31 RX ORDER — ACETAMINOPHEN 500 MG
975 TABLET ORAL ONCE
Refills: 0 | Status: COMPLETED | OUTPATIENT
Start: 2023-07-31 | End: 2023-07-31

## 2023-07-31 RX ORDER — OXYCODONE HYDROCHLORIDE 5 MG/1
5 TABLET ORAL ONCE
Refills: 0 | Status: DISCONTINUED | OUTPATIENT
Start: 2023-07-31 | End: 2023-07-31

## 2023-07-31 RX ADMIN — OXYCODONE HYDROCHLORIDE 5 MILLIGRAM(S): 5 TABLET ORAL at 14:31

## 2023-07-31 NOTE — ED PROVIDER NOTE - PROGRESS NOTE DETAILS
Saturnino Stack MD: ortho to see patient Saturnino Stack MD: Patient seen by ortho PA and discussed with Dr. Valderrama--ok to dc, no abx at this time, can f/u with Dr. Valderrama @ [facility] office tomorrow or on Thursday for already scheduled appt. Ortho team to apply dressing to wound. DC.

## 2023-07-31 NOTE — ED ADULT NURSE NOTE - NSICDXPASTSURGICALHX_GEN_ALL_CORE_FT
PAST SURGICAL HISTORY:  Ankle fracture, right 5 yrs ago tx @[facility] with plates and sharron    S/P tonsillectomy

## 2023-07-31 NOTE — ED ADULT TRIAGE NOTE - CHIEF COMPLAINT QUOTE
Patient to the ED requesting evaluation of surgical incision on back. Seen here x 2 days ago for same complaint. Afebrile, ambulatory, AAOx4, NAD.

## 2023-07-31 NOTE — ED PROVIDER NOTE - NSFOLLOWUPINSTRUCTIONS_ED_ALL_ED_FT
You were seen in the Emergency Department for: drainage from surgical site    For pain, you may take Tylenol (acetaminophen) 975 mg every 6 hours, AND/OR Advil (ibuprofen) 600 mg every 8 hours.    Please follow up with Dr. Valderrama as discussed. If you do not have a primary physician or specialist of your needs, please call [phone] to find one convenient for you. At this number you will be able to locate a provider who accepts your insurance, as well as locate the right specialist for your needs.    You should return to the Emergency Department if you feel any new/worsening/persistent symptoms including but not limited to: chest pain, difficulty breathing, loss of consciousness, bleeding, uncontrolled pain, numbness/weakness of a body part

## 2023-07-31 NOTE — ED PROVIDER NOTE - PHYSICAL EXAMINATION
Gen - NAD; well-appearing; A+Ox3   HEENT - NCAT, EOMI  Neck - suppl  Resp - CTAB, no increased WOB  CV -  RRR, no m/r/g  Abd - soft, NT, ND; no guarding or rebound  MSK - FROM of b/l UE and LE, no gross deformities  Extrem - no LE edema/erythema/tenderness  Neuro - no focal motor or sensation deficits  Skin - warm, well perfused; +fluctuance/erythema/tenderness at surgical site, no active drainage

## 2023-07-31 NOTE — ED PROVIDER NOTE - PATIENT PORTAL LINK FT
You can access the FollowMyHealth Patient Portal offered by [facility] by registering at the following website: [email or web address]. By joining [employer]’s FollowMyHealth portal, you will also be able to view your health information using other applications (apps) compatible with our system.

## 2023-07-31 NOTE — ED PROVIDER NOTE - CLINICAL SUMMARY MEDICAL DECISION MAKING FREE TEXT BOX
67 year old male with history of L5/S1 discectomy, recent ED visit 2d ago for incisional site swelling/pain, returning to ED for drainage from surgical site x 1d. Well appearing overall, afebrile, vs wnl, neurologically intact. On exam has fluctuance to surgical site with overlying erythema, no active drainage--some concern for infection vs seroma. Will need repeat eval by ortho. No indication for labs at this moment.

## 2023-07-31 NOTE — ED PROVIDER NOTE - OBJECTIVE STATEMENT
67 year old male with history of spinal stenosis s/p L5/S1 discectomy, recent ED visit 2d ago for incisional site swelling/pain, returning to ED for drainage from surgical site. States that he noticed drainage into dressing at the site today, associated with pain, denies fevers or chills, does not know if drainage was purulent. Associated with b/l sciatica. 67 year old male with history of spinal stenosis s/p L5/S1 discectomy, recent ED visit 2d ago for incisional site swelling/pain, returning to ED for drainage from surgical site. States that he noticed drainage into dressing at the site today, associated with pain, denies fevers or chills, does not know if drainage was purulent. Associated with b/l sciatica. No difficulty walking, focal numbness/weakness, incontinence, saddle anesthesia.

## 2023-07-31 NOTE — CONSULT NOTE ADULT - SUBJECTIVE AND OBJECTIVE BOX
Orthopaedic Surgery Consult Note    For Surgeon: Dr. Valderrama    HPI:  Patient is a 67y old Male with PMH of spinal stenosis s/p L5/S1 discectomy and chronic neck pain. Presented to ED 2 days ago for incisional site swelling/pain and was discharged with a medrol dose pack. Pain improved with steroids. Returned to ED today reporting pain, itchiness, and drainage from surgical site.  Denies fever, chills, SOB, CP, N/V, numbness, tingling, urinary retention or incontinence.        Allergies  No Known Allergies    Intolerances      PAST MEDICAL & SURGICAL HISTORY:  Hypertension  Hypothyroid  Kidney stones    S/P tonsillectomy  Ankle fracture, right  5 yrs ago tx @[facility] with plates and rodes      MEDICATIONS  (STANDING):  MEDICATIONS  (PRN):      Vital Signs Last 24 Hrs  T(C): 36.7 (31 Jul 2023 15:20), Max: 36.9 (31 Jul 2023 12:39)  T(F): 98.1 (31 Jul 2023 15:20), Max: 98.5 (31 Jul 2023 12:39)  HR: 73 (31 Jul 2023 15:20) (73 - 93)  BP: 164/80 (31 Jul 2023 15:20) (143/86 - 164/80)  RR: 18 (31 Jul 2023 15:20) (18 - 18)  SpO2: 98% (31 Jul 2023 15:20) (98% - 98%)    Parameters below as of 31 Jul 2023 15:20  Patient On (Oxygen Delivery Method): room air        Physical Exam:  General: A&Ox3, laying supine on exam bed, NAD  Lumbar spine: No deformity or lesions. Incision site erythematous, localized mild swelling, not warm to touch. No draining appreciated. Non tender to palpation.  Lower extremity:  Sensation: intact to light touch throughout lower extremity bilaterally  Strength: 5/5 EPL/FHL  Pulses: 2+ DP bilaterally      A/P: 67yMale s/p L4-L5 discectomy with intact non draining lumbar incisional wound  -Wound likely not infected, monitor for changes in signs and symptoms  -Continue medrol dose pack  -Pain control  -Reassess during outpatient f/u  -Offered appointment for tomorrow, patient would like to keep outpatient f/u appointment with Dr. Valderrama for Thursday      -Discussed with Dr. Valderrama    Ortho Pager [phone]   Orthopaedic Surgery Consult Note    For Surgeon: Dr. Valderrama    HPI:  Patient is a 67y old Male with PMH of spinal stenosis s/p L5/S1 discectomy and chronic neck pain. Presented to ED 2 days ago for incisional site swelling/pain and was discharged with a medrol dose pack. Reports some pain improvement with steroids. Returned to ED today reporting pain, itchiness, and drainage from surgical site.  Denies fever, chills, SOB, CP, N/V, numbness, tingling, urinary retention or incontinence.      Allergies  No Known Allergies    Intolerances      PAST MEDICAL & SURGICAL HISTORY:  Hypertension  Hypothyroid  Kidney stones    S/P tonsillectomy  Ankle fracture, right  5 yrs ago tx @[facility] with plates and rodes      MEDICATIONS  (STANDING):  MEDICATIONS  (PRN):      Vital Signs Last 24 Hrs  T(C): 36.7 (31 Jul 2023 15:20), Max: 36.9 (31 Jul 2023 12:39)  T(F): 98.1 (31 Jul 2023 15:20), Max: 98.5 (31 Jul 2023 12:39)  HR: 73 (31 Jul 2023 15:20) (73 - 93)  BP: 164/80 (31 Jul 2023 15:20) (143/86 - 164/80)  RR: 18 (31 Jul 2023 15:20) (18 - 18)  SpO2: 98% (31 Jul 2023 15:20) (98% - 98%)    Parameters below as of 31 Jul 2023 15:20  Patient On (Oxygen Delivery Method): room air        Physical Exam:  General: A&Ox3, laying supine on exam bed, NAD  Lumbar spine: No deformity or lesions. Incision site erythematous, raised weal-like lesions. Localized mild swelling, not warm to touch.   No draining appreciated. Nontender to palpation.  Dressing applied with gauze and tegaderm  Lower extremity:  Sensation: intact to light touch throughout lower extremity bilaterally  Strength: EHL/FHL/TA/GS 5/5 bilaterally  Pulses: 2+ DP bilaterally, skin warm to touch, well perfused      A/P: 67y afebrile Male s/p L4-L5 discectomy with intact non draining lumbar incisional wound  -Wound likely not infected, monitor for changes in signs and symptoms  -Continue medrol dose pack  -Pain control  -Reassess during outpatient f/u. Offered appointment for tomorrow, patient would like to keep appointment with Dr. Valderrama for Thursday      -Discussed with Dr. Valderrama    Ortho Pager [phone]   Orthopaedic Surgery Consult Note    For Surgeon: Dr. Valderrama    HPI:  Patient is a 67y old Male with PMH of spinal stenosis s/p L5/S1 discectomy and chronic neck pain. Presented to ED 2 days ago for incisional site swelling/pain and was discharged with a medrol dose pack. Reports some pain improvement with steroids. Returned to ED today reporting improved pain but experienced drainage from surgical site this AM. Denies fever, chills, SOB, CP, N/V, numbness, tingling, urinary retention or incontinence.      Allergies  No Known Allergies    Intolerances      PAST MEDICAL & SURGICAL HISTORY:  Hypertension  Hypothyroid  Kidney stones    S/P tonsillectomy  Ankle fracture, right  5 yrs ago tx @[facility] with plates and rodes      MEDICATIONS  (STANDING):  MEDICATIONS  (PRN):      Vital Signs Last 24 Hrs  T(C): 36.7 (31 Jul 2023 15:20), Max: 36.9 (31 Jul 2023 12:39)  T(F): 98.1 (31 Jul 2023 15:20), Max: 98.5 (31 Jul 2023 12:39)  HR: 73 (31 Jul 2023 15:20) (73 - 93)  BP: 164/80 (31 Jul 2023 15:20) (143/86 - 164/80)  RR: 18 (31 Jul 2023 15:20) (18 - 18)  SpO2: 98% (31 Jul 2023 15:20) (98% - 98%)    Parameters below as of 31 Jul 2023 15:20  Patient On (Oxygen Delivery Method): room air        Physical Exam:  General: A&Ox3, laying supine on exam bed, NAD  Lumbar spine: No deformity or lesions. Incision site with appropriate surrounding erythema- no warmth or drainage expressed. Localized mild swelling, not warm to touch.   No draining appreciated. Nontender to palpation.  Dressing applied with gauze and tegaderm  Lower extremity:  Sensation: intact to light touch throughout lower extremity bilaterally  Strength: EHL/FHL/TA/GS firing bilaterally, c/w prior admissions   Pulses: 2+ DP bilaterally, skin warm to touch, well perfused BLE       A/P: 67y afebrile Male s/p L4-L5 discectomy presented with concern for wound drainage   -Wound dressed; no drainage appreciated at this time;  monitor for changes in signs and symptoms  -Continue medrol dose pack  -Pain control  -Reassess during outpatient f/u- pt has planned appt for Thursday 8/3. Offered appointment for tomorrow, patient would like to keep appointment with Dr. Valderrama for Thursday  - Pt advised to Call office if worsening of drainage, fevers, chills, other concerns   -Discussed with Dr. Valderrama    Ortho Pager [phone]

## 2023-07-31 NOTE — ED ADULT NURSE NOTE - NSFALLUNIVINTERV_ED_ALL_ED
Bed/Stretcher in lowest position, wheels locked, appropriate side rails in place/Call bell, personal items and telephone in reach/Instruct patient to call for assistance before getting out of bed/chair/stretcher/Non-slip footwear applied when patient is off stretcher/[place] to call system/Physically safe environment - no spills, clutter or unnecessary equipment/Purposeful proactive rounding/Room/bathroom lighting operational, light cord in reach

## 2023-08-03 ENCOUNTER — APPOINTMENT (OUTPATIENT)
Dept: ORTHOPEDIC SURGERY | Facility: CLINIC | Age: 68
End: 2023-08-03
Payer: MEDICARE

## 2023-08-03 DIAGNOSIS — M54.9 DORSALGIA, UNSPECIFIED: ICD-10-CM

## 2023-08-03 LAB
ALBUMIN SERPL ELPH-MCNC: 4.1 G/DL
ALP BLD-CCNC: 99 U/L
ALT SERPL-CCNC: 25 U/L
ANION GAP SERPL CALC-SCNC: 14 MMOL/L
AST SERPL-CCNC: 26 U/L
BILIRUB SERPL-MCNC: 0.6 MG/DL
BUN SERPL-MCNC: 6 MG/DL
CALCIUM SERPL-MCNC: 9.3 MG/DL
CHLORIDE SERPL-SCNC: 105 MMOL/L
CHOLEST SERPL-MCNC: 197 MG/DL
CO2 SERPL-SCNC: 20 MMOL/L
CREAT SERPL-MCNC: 0.74 MG/DL
EGFR: 100 ML/MIN/1.73M2
ESTIMATED AVERAGE GLUCOSE: 105 MG/DL
GLUCOSE SERPL-MCNC: 149 MG/DL
HBA1C MFR BLD HPLC: 5.3 %
HDLC SERPL-MCNC: 55 MG/DL
LDLC SERPL CALC-MCNC: 123 MG/DL
NONHDLC SERPL-MCNC: 142 MG/DL
POTASSIUM SERPL-SCNC: 3.7 MMOL/L
PROT SERPL-MCNC: 6.6 G/DL
PSA SERPL-MCNC: 4.09 NG/ML
SODIUM SERPL-SCNC: 139 MMOL/L
TESTOST SERPL-MCNC: 554 NG/DL
TRIGL SERPL-MCNC: 109 MG/DL
TSH SERPL-ACNC: 1.22 UIU/ML

## 2023-08-03 PROCEDURE — 99024 POSTOP FOLLOW-UP VISIT: CPT

## 2023-08-03 NOTE — PHYSICAL EXAM
[de-identified] : Gen: NAD MSK: + Spurling test on the left Nontender over cervical spine surgical incision clean, dry, intact swelling appreciated around the surgical site no drainage   LLE: unable to single limb heel rise NVID  SILT L2-S1 2+ Dp/PT  RLE: Able to single limb heel rise NVID  SILT L2-S1 2+ Dp/PT

## 2023-08-03 NOTE — HISTORY OF PRESENT ILLNESS
[de-identified] : Patient is a 67 Male s/p Left L5-S1 microdiscectomy who presents with swelling over his incision. Patient presented to the ED at [facility] 5 days ago with swelling and discharge from the wound. Low concern for infection likely postoperative seroma and was discharged with steroids. Today his surgical incision is dry. He additionally complains of some LLE pain and neck pain.

## 2023-08-05 PROBLEM — M54.9 BACK PAIN: Status: ACTIVE | Noted: 2023-08-03

## 2023-08-10 NOTE — ED PROVIDER NOTE - NS ED ATTENDING STATEMENT MOD
Per wife: Nursing home in [facility].  [place] assisted, [facility] and [facility] for nursing. Choice per wife. Explained the Insurance will have to approve any facility and transportation back to [facility] when stable.    Attending Only

## 2023-08-14 ENCOUNTER — EMERGENCY (EMERGENCY)
Facility: HOSPITAL | Age: 68
LOS: 1 days | Discharge: LEFT BEFORE DISCHARGE | End: 2023-08-14
Attending: STUDENT IN AN ORGANIZED HEALTH CARE EDUCATION/TRAINING PROGRAM | Admitting: STUDENT IN AN ORGANIZED HEALTH CARE EDUCATION/TRAINING PROGRAM
Payer: MEDICARE

## 2023-08-14 VITALS
OXYGEN SATURATION: 95 % | RESPIRATION RATE: 16 BRPM | HEART RATE: 91 BPM | WEIGHT: 160.06 LBS | TEMPERATURE: 98 F | DIASTOLIC BLOOD PRESSURE: 90 MMHG | SYSTOLIC BLOOD PRESSURE: 155 MMHG | HEIGHT: 71 IN

## 2023-08-14 DIAGNOSIS — Z53.29 PROCEDURE AND TREATMENT NOT CARRIED OUT BECAUSE OF PATIENT'S DECISION FOR OTHER REASONS: ICD-10-CM

## 2023-08-14 DIAGNOSIS — M54.50 LOW BACK PAIN, UNSPECIFIED: ICD-10-CM
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
## 2023-08-14 DIAGNOSIS — E03.9 HYPOTHYROIDISM, UNSPECIFIED: ICD-10-CM

## 2023-08-14 DIAGNOSIS — Z90.89 ACQUIRED ABSENCE OF OTHER ORGANS: ICD-10-CM

## 2023-08-14 DIAGNOSIS — Z87.81 PERSONAL HISTORY OF (HEALED) TRAUMATIC FRACTURE: ICD-10-CM

## 2023-08-14 DIAGNOSIS — L76.82 OTHER POSTPROCEDURAL COMPLICATIONS OF SKIN AND SUBCUTANEOUS TISSUE: ICD-10-CM

## 2023-08-14 DIAGNOSIS — I10 ESSENTIAL (PRIMARY) HYPERTENSION: ICD-10-CM

## 2023-08-14 DIAGNOSIS — Z87.442 PERSONAL HISTORY OF URINARY CALCULI: ICD-10-CM

## 2023-08-14 DIAGNOSIS — Z90.89 ACQUIRED ABSENCE OF OTHER ORGANS: Chronic | ICD-10-CM

## 2023-08-14 DIAGNOSIS — Z87.39 PERSONAL HISTORY OF OTHER DISEASES OF THE MUSCULOSKELETAL SYSTEM AND CONNECTIVE TISSUE: ICD-10-CM

## 2023-08-14 DIAGNOSIS — S82.891A OTHER FRACTURE OF RIGHT LOWER LEG, INITIAL ENCOUNTER FOR CLOSED FRACTURE: Chronic | ICD-10-CM
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
## 2023-08-14 LAB
ANION GAP SERPL CALC-SCNC: 7 MMOL/L — SIGNIFICANT CHANGE UP (ref 5–17)
BASOPHILS # BLD AUTO: 0.07 K/UL — SIGNIFICANT CHANGE UP (ref 0–0.2)
BASOPHILS NFR BLD AUTO: 1.3 % — SIGNIFICANT CHANGE UP (ref 0–2)
BUN SERPL-MCNC: 16 MG/DL — SIGNIFICANT CHANGE UP (ref 7–23)
CALCIUM SERPL-MCNC: 9.2 MG/DL — SIGNIFICANT CHANGE UP (ref 8.4–10.5)
CHLORIDE SERPL-SCNC: 109 MMOL/L — HIGH (ref 96–108)
CO2 SERPL-SCNC: 23 MMOL/L — SIGNIFICANT CHANGE UP (ref 22–31)
CREAT SERPL-MCNC: 0.64 MG/DL — SIGNIFICANT CHANGE UP (ref 0.5–1.3)
CRP SERPL-MCNC: <3 MG/L — SIGNIFICANT CHANGE UP (ref 0–4)
EGFR: 104 ML/MIN/1.73M2 — SIGNIFICANT CHANGE UP
EOSINOPHIL # BLD AUTO: 0.19 K/UL — SIGNIFICANT CHANGE UP (ref 0–0.5)
EOSINOPHIL NFR BLD AUTO: 3.6 % — SIGNIFICANT CHANGE UP (ref 0–6)
ERYTHROCYTE [SEDIMENTATION RATE] IN BLOOD: 7 MM/HR — SIGNIFICANT CHANGE UP
GLUCOSE SERPL-MCNC: 107 MG/DL — HIGH (ref 70–99)
HCT VFR BLD CALC: 43.7 % — SIGNIFICANT CHANGE UP (ref 39–50)
HGB BLD-MCNC: 14.9 G/DL — SIGNIFICANT CHANGE UP (ref 13–17)
IMM GRANULOCYTES NFR BLD AUTO: 0.2 % — SIGNIFICANT CHANGE UP (ref 0–0.9)
LYMPHOCYTES # BLD AUTO: 1.14 K/UL — SIGNIFICANT CHANGE UP (ref 1–3.3)
LYMPHOCYTES # BLD AUTO: 21.3 % — SIGNIFICANT CHANGE UP (ref 13–44)
MCHC RBC-ENTMCNC: 32.5 PG — SIGNIFICANT CHANGE UP (ref 27–34)
MCHC RBC-ENTMCNC: 34.1 GM/DL — SIGNIFICANT CHANGE UP (ref 32–36)
MCV RBC AUTO: 95.4 FL — SIGNIFICANT CHANGE UP (ref 80–100)
MONOCYTES # BLD AUTO: 0.47 K/UL — SIGNIFICANT CHANGE UP (ref 0–0.9)
MONOCYTES NFR BLD AUTO: 8.8 % — SIGNIFICANT CHANGE UP (ref 2–14)
NEUTROPHILS # BLD AUTO: 3.46 K/UL — SIGNIFICANT CHANGE UP (ref 1.8–7.4)
NEUTROPHILS NFR BLD AUTO: 64.8 % — SIGNIFICANT CHANGE UP (ref 43–77)
NRBC # BLD: 0 /100 WBCS — SIGNIFICANT CHANGE UP (ref 0–0)
PLATELET # BLD AUTO: 225 K/UL — SIGNIFICANT CHANGE UP (ref 150–400)
POTASSIUM SERPL-MCNC: 3.8 MMOL/L — SIGNIFICANT CHANGE UP (ref 3.5–5.3)
POTASSIUM SERPL-SCNC: 3.8 MMOL/L — SIGNIFICANT CHANGE UP (ref 3.5–5.3)
RBC # BLD: 4.58 M/UL — SIGNIFICANT CHANGE UP (ref 4.2–5.8)
RBC # FLD: 12.5 % — SIGNIFICANT CHANGE UP (ref 10.3–14.5)
SODIUM SERPL-SCNC: 139 MMOL/L — SIGNIFICANT CHANGE UP (ref 135–145)
WBC # BLD: 5.34 K/UL — SIGNIFICANT CHANGE UP (ref 3.8–10.5)
WBC # FLD AUTO: 5.34 K/UL — SIGNIFICANT CHANGE UP (ref 3.8–10.5)

## 2023-08-14 PROCEDURE — 87040 BLOOD CULTURE FOR BACTERIA: CPT

## 2023-08-14 PROCEDURE — 99283 EMERGENCY DEPT VISIT LOW MDM: CPT

## 2023-08-14 PROCEDURE — 99285 EMERGENCY DEPT VISIT HI MDM: CPT

## 2023-08-14 PROCEDURE — 36415 COLL VENOUS BLD VENIPUNCTURE: CPT

## 2023-08-14 PROCEDURE — 86140 C-REACTIVE PROTEIN: CPT

## 2023-08-14 PROCEDURE — 99284 EMERGENCY DEPT VISIT MOD MDM: CPT

## 2023-08-14 PROCEDURE — 85652 RBC SED RATE AUTOMATED: CPT

## 2023-08-14 PROCEDURE — 85025 COMPLETE CBC W/AUTO DIFF WBC: CPT

## 2023-08-14 PROCEDURE — 80048 BASIC METABOLIC PNL TOTAL CA: CPT

## 2023-08-14 RX ORDER — KETOROLAC TROMETHAMINE 30 MG/ML
15 SYRINGE (ML) INJECTION ONCE
Refills: 0 | Status: DISCONTINUED | OUTPATIENT
Start: 2023-08-14 | End: 2023-08-14

## 2023-08-14 NOTE — CONSULT NOTE ADULT - SUBJECTIVE AND OBJECTIVE BOX
Orthopaedic Surgery Consult Note    CC: Patient is a 67y old  Male who presents with a chief complaint of wound drainage    HPI: 68yo M with pmhx of spinal stenosis s/p L5/S1 microdiscectomy on  with Dr. Valderrama. Patient with multiple presentations to the ED complaining of wound drainage. Most recently seen by Dr. Valderrama in clinic on . Presents today walking with a cane reporting drainage from wound with burning sensation of wound and increased pain into LLE last night. Pt states low back pain into LLE was improving since the surgery, however states pain has been worse the past week without trauma or injury. Pt states wound has been draining clear/light orange fluid intermittently since his procedure. He was seen in the ED on  given 6 days of medrol dose pack. Returned to ED again on  with similar complaint, advised to finish steroid pack. Reports swelling improved from medrol pack however continues to report intermittent drainage. Pt has been taking percocet 5mg/325mg oral at night for the pain. Seen for follow up in office on  with two week follow up appointment this Thursday with Dr. Valderrama.       Denies fever, chills, urinary retention or incontinence, denies bowel incontinence, denies saddle anesthesia. Denies numbness or tingling into bilateral lower extremities.        ROS: Positive for  Denies fevers, chills, headache, dizziness, chest pain, shortness of breath, nausea, vomiting.   All other systems negative, except for above.     Allergies    No Known Allergies    Intolerances      PAST MEDICAL & SURGICAL HISTORY:  Hypertension  Hypothyroid  Kidney stones  S/P tonsillectomy  Ankle fracture, right  5 yrs ago tx @[facility] with plates and rodes      FAMILY HISTORY:  Family history of acute myocardial infarction (Father)  Father  at the age of 69      Medications: see med rec    Vital Signs Last 24 Hrs  T(C): 36.8 (14 Aug 2023 09:13), Max: 36.8 (14 Aug 2023 09:13)  T(F): 98.3 (14 Aug 2023 09:13), Max: 98.3 (14 Aug 2023 09:13)  HR: 91 (14 Aug 2023 09:13) (91 - 91)  BP: 155/90 (14 Aug 2023 09:13) (155/90 - 155/90)  BP(mean): --  RR: 16 (14 Aug 2023 09:13) (16 - 16)  SpO2: 95% (14 Aug 2023 09:13) (95% - 95%)    Parameters below as of 14 Aug 2023 09:13  Patient On (Oxygen Delivery Method): room air        PE:  General: Well developed, well nourished, in no acute distress, comfortable  Neuro: Alert, oriented x 3  Psych: Normal mood & affect   Skin: Warm, dry, extremities well perfused, no obvious rash  MSK:    -Inspection/palpation:     -Sensation:    -Motor:     -ROM:    -Pulses:                             14.9   5.34  )-----------( 225      ( 14 Aug 2023 09:47 )             43.7     08-14    139  |  109<H>  |  16  ----------------------------<  107<H>  3.8   |  23  |  0.64    Ca    9.2      14 Aug 2023 09:47      A/P: 67yMale afebrile s/p L5-S1 microdiscectomy on  presented with concern for wound drainage and worsening LLE pain.  - Labs, WBC 5.34, CRP <3, afebrile, blood cultures x 2 drawn  - Pain control  - Redress wound with gauze and tegaderm  - WBAT  - Pt advised to call office if worsening of drainage, fever, chills, or other concerns    Patient history, exam, imaging and plan discussed with   who is in agreement    Ortho Pager [phone]    ___ minutes spent on total encounter, over 50% of the visit was spent counseling and/or coordinating care.      Orthopaedic Surgery Consult Note    CC: Patient is a 67y old  Male who presents with a chief complaint of wound drainage    HPI: 66yo M with pmhx of spinal stenosis s/p L5/S1 microdiscectomy on  with Dr. Valderrama. Patient with multiple presentations to the ED complaining of wound drainage. Most recently seen by Dr. Valderrama in clinic on . Presents today walking with a cane reporting drainage from wound with burning sensation of wound and increased pain into LLE last night. Pt states low back pain into LLE was improving since the surgery, however states pain has been worse the past week without trauma or injury. Pt states wound has been draining clear/light orange fluid intermittently since his procedure. He was seen in the ED on  given 6 days of medrol dose pack. Returned to ED again on  with similar complaint, advised to finish steroid pack. Reports swelling improved from medrol pack however continues to report intermittent drainage. Pt has been taking percocet 5mg/325mg oral at night for the pain. Seen for follow up in office on  with two week follow up appointment this Thursday with Dr. Valderrama.       Denies fever, chills, urinary retention or incontinence, denies bowel incontinence, denies saddle anesthesia. Denies numbness or tingling into bilateral lower extremities.        ROS: Positive for above  All other systems negative, except for above.     Allergies    No Known Allergies    Intolerances      PAST MEDICAL & SURGICAL HISTORY:  Hypertension  Hypothyroid  Kidney stones  S/P tonsillectomy  Ankle fracture, right  5 yrs ago tx @[facility] with irma and sharron      FAMILY HISTORY:  Family history of acute myocardial infarction (Father)  Father  at the age of 69      Medications: see med rec    Vital Signs Last 24 Hrs  T(C): 36.8 (14 Aug 2023 09:13), Max: 36.8 (14 Aug 2023 09:13)  T(F): 98.3 (14 Aug 2023 09:13), Max: 98.3 (14 Aug 2023 09:13)  HR: 91 (14 Aug 2023 09:13) (91 - 91)  BP: 155/90 (14 Aug 2023 09:13) (155/90 - 155/90)  BP(mean): --  RR: 16 (14 Aug 2023 09:13) (16 - 16)  SpO2: 95% (14 Aug 2023 09:13) (95% - 95%)    Parameters below as of 14 Aug 2023 09:13  Patient On (Oxygen Delivery Method): room air        PE:  General: Well developed, well nourished, in no acute distress, comfortable  Neuro: Alert, oriented x 3  Psych: Normal mood & affect   Skin: Warm, dry, extremities well perfused, no obvious rash  MSK:    -Inspection/palpation:     -Sensation:    -Motor:     -ROM:    -Pulses:                             14.9   5.34  )-----------( 225      ( 14 Aug 2023 09:47 )             43.7     08-    139  |  109<H>  |  16  ----------------------------<  107<H>  3.8   |  23  |  0.64    Ca    9.2      14 Aug 2023 09:47      A/P: 67yMale afebrile s/p L5-S1 microdiscectomy on  presented with concern for wound drainage and worsening LLE pain.  - Labs, WBC 5.34, CRP <3, ESR- 7,  afebrile, blood cultures x 2 drawn  - Pain control  - Redress wound with gauze and tegaderm  - WBAT  - Follow up with Dr. Valderrama at scheduled visit on Thursday  - Pt advised to call office if worsening of drainage, fever, chills, or other concerns    Patient history, exam, imaging and plan discussed with Dr. Valderrama who is in agreement  ***Pt eloped from ED prior to discussion of final plan***  Ortho Pager [phone]

## 2023-08-14 NOTE — ED PROVIDER NOTE - OBJECTIVE STATEMENT
The pt is a 68 y/o M, who presents to ED c/o increased drainage from lower back incision -- s/p back surg 3 mon ago, states drainage x 3 wks. Pt seen for same few wks ago and given steroids - finished course, has not seen his spine surg. Pt c/o clear discharge, local burning pain, taking percocet. Denies fevers, chills, cp, sob, n/v/d, abd pain, numbness or tingling to toes, loss of bowel or bladder control.

## 2023-08-14 NOTE — ED ADULT TRIAGE NOTE - CHIEF COMPLAINT QUOTE
Pt presenting for wound check. States had back surgery 2 months ago and has increased output from surgical site. Denies fevers/chills. Ambulating with pain.

## 2023-08-14 NOTE — ED ADULT TRIAGE NOTE - GLASGOW COMA SCALE: EYE OPENING, MLM
(E4) spontaneous Rhomboid Transposition Flap Text: The defect edges were debeveled with a #15 scalpel blade.  Given the location of the defect and the proximity to free margins a rhomboid transposition flap was deemed most appropriate.  Using a sterile surgical marker, an appropriate rhomboid flap was drawn incorporating the defect.    The area thus outlined was incised deep to adipose tissue with a #15 scalpel blade.  The skin margins were undermined to an appropriate distance in all directions utilizing iris scissors.

## 2023-08-14 NOTE — ED ADULT NURSE NOTE - OBJECTIVE STATEMENT
Patient to the ED c/o drainage at surgical incision site x 3 weeks, surgery x 2 months ago. Recently seen for same complaint. Denies fever/chills. AAOX4, NAD.

## 2023-08-14 NOTE — ED ADULT NURSE NOTE - ISAR MEMORY
[facility]  Physical Therapy Daily Treatment Note  Date: 2021  Patient Name: Mac Pelayo  : 1949   MRN: [id number]  Referring Provider: ALISON Canela CNP  [address] [place] [address]  Vickie,   [facility] of Early Diagnosis: Parkinson's    Outcome Measure: RENE= 52    S: Pt reports compliance with HEP. He feels some improvement in balance. O:   Time 09:    Visit     Pain 0/10    ROM      Modalities     Exercise     Nustep L6 x 7 min    LAQ     Hamstring Curl alt 2# x 2 min    squats     calf raises 2# x 1 min    Hip abd alt 2# x 2 min    Hip ext 2# x 2 min    March with AW 2# x 2 min              THERAPEUTIC ACTIVITIES Large, functional, dynamic, global movements used to build strength, balance, endurance, and flexibility and to improve physical performance. Step-ups - FWD Alt. 6\" x 2 min    Step-ups - LAT 6\" x 2 min, up & over    Step-ups - BWD     Alt lunges 6\" x 2 min          NMR Feedback and cues necessary for developing neuromuscular control. Movement education and guided movement interventions  used to improve performance and control. To improve balance for safe community and home ambulation   Resisted walk      FWD      BKWD      lat     March 4 x 20'    Side stepping 4 x 20'    Retro walk 4 x 20'    Heel to toe 4 x 20 '    Fwd with cones     A:  Tolerated well. No LOB.   P: Continue with rehab plan  Raudel Eric PTA    Treatment Charges: Mins Units   Initial Evaluation       Re-Evaluation     Ther Exercise         TE 37 2   Manual Therapy     MT     Ther Activities        TA 8 1   Gait Training          GT     Neuro Re-education NR 10 1   Modalities     Non-Billable Service Time     Other     Total Time/Units  55 4 No

## 2023-08-14 NOTE — ED PROVIDER NOTE - CLINICAL SUMMARY MEDICAL DECISION MAKING FREE TEXT BOX
pt c/o drainage from surg site x 3 wks, seen for same 2 wks ago and given steroids (states temporary improvement), has not f/u w/ortho, no fevers/pus/decreased rom, ambulatory w/o dif, taking percocet for pain, suspect seroma - exam not concerning for hematoma or cellulitis or abscess, labs wnl - normal wbc count and neg inflammatory markers, ortho (spine) consulted - pt eloped prior to official dc/dispo

## 2023-08-14 NOTE — ED PROVIDER NOTE - MUSCULOSKELETAL, MLM
a 3x4 cm area of swelling to lower lumbar spine, + clear dc noted, no warmth, no pus, no bleeding, no erythema, FROM of spine, normal gait

## 2023-08-17 ENCOUNTER — APPOINTMENT (OUTPATIENT)
Dept: ORTHOPEDIC SURGERY | Facility: CLINIC | Age: 68
End: 2023-08-17
Payer: MEDICARE

## 2023-08-17 PROCEDURE — 99024 POSTOP FOLLOW-UP VISIT: CPT

## 2023-08-19 LAB
CULTURE RESULTS: SIGNIFICANT CHANGE UP
CULTURE RESULTS: SIGNIFICANT CHANGE UP
SPECIMEN SOURCE: SIGNIFICANT CHANGE UP
SPECIMEN SOURCE: SIGNIFICANT CHANGE UP

## 2023-08-28 NOTE — ASSESSMENT
[FreeTextEntry1] : 67 M s/p L L5-S1 microdiscectomy followup Will send for lumbar MRI w/ and w/o contrast - WBAT - Physical therapy - f/u after MRI

## 2023-08-28 NOTE — HISTORY OF PRESENT ILLNESS
[de-identified] : 67 Male s/p Left L5-S1 microdiscectomy followup. He went to ED and had blood work completed without concern for infection.  He notes continued back pain with some pain radiating to his leg. He has not started PT.

## 2023-08-28 NOTE — DISCUSSION/SUMMARY
[de-identified] : All medical record entries made by "residents name" acting as a scribe for this encounter under the direction of "Dr Valderrama." I have reviewed the chart and agree that the record accurately reflects my personal performance of the history, physical exam, assessment and plan. I have also personally directed, reviewed and agreed with the chart.

## 2023-08-28 NOTE — PHYSICAL EXAM
[de-identified] : Gen: NAD MSK: incision c/d/i LLE: unable to single limb heel rise NVID SILT L2-S1 2+ Dp/PT  RLE: Able to single limb heel rise NVID SILT L2-S1 2+ Dp/PT

## 2023-08-31 ENCOUNTER — APPOINTMENT (OUTPATIENT)
Dept: ORTHOPEDIC SURGERY | Facility: CLINIC | Age: 68
End: 2023-08-31

## 2023-08-31 NOTE — PHYSICAL EXAM
[de-identified] :  Physical Exam     Gen: NAD MSK: incision c/d/i LLE: unable to single limb heel rise NVID SILT L2-S1 2+ Dp/PT  RLE: Able to single limb heel rise NVID SILT L2-S1 2+ Dp/PT

## 2023-08-31 NOTE — ASSESSMENT
[FreeTextEntry1] :  Assessment Left lumbar radiculopathy (724.4) (M54.16) 67 M s/p L L5-S1 microdiscectomy followup  MRI w/ and w/o contrast non-concernign for acute pathology - WBAT - Physical therapy - f/u one month

## 2023-08-31 NOTE — DISCUSSION/SUMMARY
[de-identified] : All medical record entries made by "residents name" acting as a scribe for this encounter under the direction of "Dr. Diaz." I have reviewed the chart and agree that the record accurately reflects my personal performance of the history, physical exam, assessment and plan. I have also personally directed, reviewed and agreed with the chart.

## 2023-08-31 NOTE — HISTORY OF PRESENT ILLNESS
[de-identified] : 67 Male s/p Left L5-S1 microdiscectomy follow-up. He notes continued back pain with some pain radiating to his leg. Started PT. MRI obtained.

## 2023-09-05 RX ORDER — OXYCODONE AND ACETAMINOPHEN 5; 325 MG/1; MG/1
5-325 TABLET ORAL DAILY
Qty: 14 | Refills: 0 | Status: ACTIVE | COMMUNITY
Start: 2023-09-05 | End: 1900-01-01

## 2023-10-26 ENCOUNTER — RESULT REVIEW (OUTPATIENT)
Age: 68
End: 2023-10-26

## 2023-10-26 ENCOUNTER — APPOINTMENT (OUTPATIENT)
Dept: ORTHOPEDIC SURGERY | Facility: CLINIC | Age: 68
End: 2023-10-26

## 2023-10-26 ENCOUNTER — OUTPATIENT (OUTPATIENT)
Dept: OUTPATIENT SERVICES | Facility: HOSPITAL | Age: 68
LOS: 1 days | End: 2023-10-26
Payer: MEDICARE

## 2023-10-26 DIAGNOSIS — Z90.89 ACQUIRED ABSENCE OF OTHER ORGANS: Chronic | ICD-10-CM

## 2023-10-26 DIAGNOSIS — S82.891A OTHER FRACTURE OF RIGHT LOWER LEG, INITIAL ENCOUNTER FOR CLOSED FRACTURE: Chronic | ICD-10-CM

## 2023-10-26 PROCEDURE — 72082 X-RAY EXAM ENTIRE SPI 2/3 VW: CPT | Mod: 26

## 2023-10-26 PROCEDURE — 72100 X-RAY EXAM L-S SPINE 2/3 VWS: CPT

## 2023-10-26 PROCEDURE — 72100 X-RAY EXAM L-S SPINE 2/3 VWS: CPT | Mod: 26,59

## 2023-10-26 PROCEDURE — 72082 X-RAY EXAM ENTIRE SPI 2/3 VW: CPT

## 2023-11-12 ENCOUNTER — EMERGENCY (EMERGENCY)
Facility: HOSPITAL | Age: 68
LOS: 1 days | Discharge: HOME | End: 2023-11-12
Attending: EMERGENCY MEDICINE | Admitting: EMERGENCY MEDICINE
Payer: MEDICARE

## 2023-11-12 VITALS
RESPIRATION RATE: 18 BRPM | DIASTOLIC BLOOD PRESSURE: 78 MMHG | TEMPERATURE: 98 F | SYSTOLIC BLOOD PRESSURE: 145 MMHG | WEIGHT: 154.98 LBS | OXYGEN SATURATION: 96 % | HEART RATE: 111 BPM

## 2023-11-12 VITALS
SYSTOLIC BLOOD PRESSURE: 145 MMHG | DIASTOLIC BLOOD PRESSURE: 84 MMHG | RESPIRATION RATE: 18 BRPM | OXYGEN SATURATION: 95 % | HEART RATE: 77 BPM | TEMPERATURE: 98 F

## 2023-11-12 DIAGNOSIS — E03.9 HYPOTHYROIDISM, UNSPECIFIED: ICD-10-CM

## 2023-11-12 DIAGNOSIS — N40.0 BENIGN PROSTATIC HYPERPLASIA WITHOUT LOWER URINARY TRACT SYMPTOMS: ICD-10-CM

## 2023-11-12 DIAGNOSIS — S82.891A OTHER FRACTURE OF RIGHT LOWER LEG, INITIAL ENCOUNTER FOR CLOSED FRACTURE: Chronic | ICD-10-CM

## 2023-11-12 DIAGNOSIS — Z90.89 ACQUIRED ABSENCE OF OTHER ORGANS: Chronic | ICD-10-CM

## 2023-11-12 LAB
ALBUMIN SERPL ELPH-MCNC: 4 G/DL — SIGNIFICANT CHANGE UP (ref 3.3–5)
ALBUMIN SERPL ELPH-MCNC: 4 G/DL — SIGNIFICANT CHANGE UP (ref 3.3–5)
ALP SERPL-CCNC: 94 U/L — SIGNIFICANT CHANGE UP (ref 40–120)
ALP SERPL-CCNC: 94 U/L — SIGNIFICANT CHANGE UP (ref 40–120)
ALT FLD-CCNC: 25 U/L — SIGNIFICANT CHANGE UP (ref 10–45)
ALT FLD-CCNC: 25 U/L — SIGNIFICANT CHANGE UP (ref 10–45)
ANION GAP SERPL CALC-SCNC: 11 MMOL/L — SIGNIFICANT CHANGE UP (ref 5–17)
ANION GAP SERPL CALC-SCNC: 11 MMOL/L — SIGNIFICANT CHANGE UP (ref 5–17)
APPEARANCE UR: CLEAR — SIGNIFICANT CHANGE UP
APPEARANCE UR: CLEAR — SIGNIFICANT CHANGE UP
AST SERPL-CCNC: 25 U/L — SIGNIFICANT CHANGE UP (ref 10–40)
AST SERPL-CCNC: 25 U/L — SIGNIFICANT CHANGE UP (ref 10–40)
BASOPHILS # BLD AUTO: 0.05 K/UL — SIGNIFICANT CHANGE UP (ref 0–0.2)
BASOPHILS # BLD AUTO: 0.05 K/UL — SIGNIFICANT CHANGE UP (ref 0–0.2)
BASOPHILS NFR BLD AUTO: 0.7 % — SIGNIFICANT CHANGE UP (ref 0–2)
BASOPHILS NFR BLD AUTO: 0.7 % — SIGNIFICANT CHANGE UP (ref 0–2)
BILIRUB SERPL-MCNC: 0.3 MG/DL — SIGNIFICANT CHANGE UP (ref 0.2–1.2)
BILIRUB SERPL-MCNC: 0.3 MG/DL — SIGNIFICANT CHANGE UP (ref 0.2–1.2)
BILIRUB UR-MCNC: NEGATIVE — SIGNIFICANT CHANGE UP
BILIRUB UR-MCNC: NEGATIVE — SIGNIFICANT CHANGE UP
BUN SERPL-MCNC: 19 MG/DL — SIGNIFICANT CHANGE UP (ref 7–23)
BUN SERPL-MCNC: 19 MG/DL — SIGNIFICANT CHANGE UP (ref 7–23)
CALCIUM SERPL-MCNC: 9.6 MG/DL — SIGNIFICANT CHANGE UP (ref 8.4–10.5)
CALCIUM SERPL-MCNC: 9.6 MG/DL — SIGNIFICANT CHANGE UP (ref 8.4–10.5)
CHLORIDE SERPL-SCNC: 105 MMOL/L — SIGNIFICANT CHANGE UP (ref 96–108)
CHLORIDE SERPL-SCNC: 105 MMOL/L — SIGNIFICANT CHANGE UP (ref 96–108)
CO2 SERPL-SCNC: 24 MMOL/L — SIGNIFICANT CHANGE UP (ref 22–31)
CO2 SERPL-SCNC: 24 MMOL/L — SIGNIFICANT CHANGE UP (ref 22–31)
COLOR SPEC: YELLOW — SIGNIFICANT CHANGE UP
COLOR SPEC: YELLOW — SIGNIFICANT CHANGE UP
CREAT SERPL-MCNC: 0.71 MG/DL — SIGNIFICANT CHANGE UP (ref 0.5–1.3)
CREAT SERPL-MCNC: 0.71 MG/DL — SIGNIFICANT CHANGE UP (ref 0.5–1.3)
DIFF PNL FLD: NEGATIVE — SIGNIFICANT CHANGE UP
DIFF PNL FLD: NEGATIVE — SIGNIFICANT CHANGE UP
EGFR: 101 ML/MIN/1.73M2 — SIGNIFICANT CHANGE UP
EGFR: 101 ML/MIN/1.73M2 — SIGNIFICANT CHANGE UP
EOSINOPHIL # BLD AUTO: 0.09 K/UL — SIGNIFICANT CHANGE UP (ref 0–0.5)
EOSINOPHIL # BLD AUTO: 0.09 K/UL — SIGNIFICANT CHANGE UP (ref 0–0.5)
EOSINOPHIL NFR BLD AUTO: 1.3 % — SIGNIFICANT CHANGE UP (ref 0–6)
EOSINOPHIL NFR BLD AUTO: 1.3 % — SIGNIFICANT CHANGE UP (ref 0–6)
GLUCOSE SERPL-MCNC: 90 MG/DL — SIGNIFICANT CHANGE UP (ref 70–99)
GLUCOSE SERPL-MCNC: 90 MG/DL — SIGNIFICANT CHANGE UP (ref 70–99)
GLUCOSE UR QL: NEGATIVE MG/DL — SIGNIFICANT CHANGE UP
GLUCOSE UR QL: NEGATIVE MG/DL — SIGNIFICANT CHANGE UP
HCT VFR BLD CALC: 40.7 % — SIGNIFICANT CHANGE UP (ref 39–50)
HCT VFR BLD CALC: 40.7 % — SIGNIFICANT CHANGE UP (ref 39–50)
HGB BLD-MCNC: 14.1 G/DL — SIGNIFICANT CHANGE UP (ref 13–17)
HGB BLD-MCNC: 14.1 G/DL — SIGNIFICANT CHANGE UP (ref 13–17)
IMM GRANULOCYTES NFR BLD AUTO: 0.3 % — SIGNIFICANT CHANGE UP (ref 0–0.9)
IMM GRANULOCYTES NFR BLD AUTO: 0.3 % — SIGNIFICANT CHANGE UP (ref 0–0.9)
KETONES UR-MCNC: ABNORMAL MG/DL
KETONES UR-MCNC: ABNORMAL MG/DL
LEUKOCYTE ESTERASE UR-ACNC: NEGATIVE — SIGNIFICANT CHANGE UP
LEUKOCYTE ESTERASE UR-ACNC: NEGATIVE — SIGNIFICANT CHANGE UP
LYMPHOCYTES # BLD AUTO: 1.26 K/UL — SIGNIFICANT CHANGE UP (ref 1–3.3)
LYMPHOCYTES # BLD AUTO: 1.26 K/UL — SIGNIFICANT CHANGE UP (ref 1–3.3)
LYMPHOCYTES # BLD AUTO: 18.3 % — SIGNIFICANT CHANGE UP (ref 13–44)
LYMPHOCYTES # BLD AUTO: 18.3 % — SIGNIFICANT CHANGE UP (ref 13–44)
MCHC RBC-ENTMCNC: 31.8 PG — SIGNIFICANT CHANGE UP (ref 27–34)
MCHC RBC-ENTMCNC: 31.8 PG — SIGNIFICANT CHANGE UP (ref 27–34)
MCHC RBC-ENTMCNC: 34.6 GM/DL — SIGNIFICANT CHANGE UP (ref 32–36)
MCHC RBC-ENTMCNC: 34.6 GM/DL — SIGNIFICANT CHANGE UP (ref 32–36)
MCV RBC AUTO: 91.9 FL — SIGNIFICANT CHANGE UP (ref 80–100)
MCV RBC AUTO: 91.9 FL — SIGNIFICANT CHANGE UP (ref 80–100)
MONOCYTES # BLD AUTO: 0.57 K/UL — SIGNIFICANT CHANGE UP (ref 0–0.9)
MONOCYTES # BLD AUTO: 0.57 K/UL — SIGNIFICANT CHANGE UP (ref 0–0.9)
MONOCYTES NFR BLD AUTO: 8.3 % — SIGNIFICANT CHANGE UP (ref 2–14)
MONOCYTES NFR BLD AUTO: 8.3 % — SIGNIFICANT CHANGE UP (ref 2–14)
NEUTROPHILS # BLD AUTO: 4.9 K/UL — SIGNIFICANT CHANGE UP (ref 1.8–7.4)
NEUTROPHILS # BLD AUTO: 4.9 K/UL — SIGNIFICANT CHANGE UP (ref 1.8–7.4)
NEUTROPHILS NFR BLD AUTO: 71.1 % — SIGNIFICANT CHANGE UP (ref 43–77)
NEUTROPHILS NFR BLD AUTO: 71.1 % — SIGNIFICANT CHANGE UP (ref 43–77)
NITRITE UR-MCNC: NEGATIVE — SIGNIFICANT CHANGE UP
NITRITE UR-MCNC: NEGATIVE — SIGNIFICANT CHANGE UP
NRBC # BLD: 0 /100 WBCS — SIGNIFICANT CHANGE UP (ref 0–0)
NRBC # BLD: 0 /100 WBCS — SIGNIFICANT CHANGE UP (ref 0–0)
PH UR: 6.5 — SIGNIFICANT CHANGE UP (ref 5–8)
PH UR: 6.5 — SIGNIFICANT CHANGE UP (ref 5–8)
PLATELET # BLD AUTO: 212 K/UL — SIGNIFICANT CHANGE UP (ref 150–400)
PLATELET # BLD AUTO: 212 K/UL — SIGNIFICANT CHANGE UP (ref 150–400)
POTASSIUM SERPL-MCNC: 3.8 MMOL/L — SIGNIFICANT CHANGE UP (ref 3.5–5.3)
POTASSIUM SERPL-MCNC: 3.8 MMOL/L — SIGNIFICANT CHANGE UP (ref 3.5–5.3)
POTASSIUM SERPL-SCNC: 3.8 MMOL/L — SIGNIFICANT CHANGE UP (ref 3.5–5.3)
POTASSIUM SERPL-SCNC: 3.8 MMOL/L — SIGNIFICANT CHANGE UP (ref 3.5–5.3)
PROT SERPL-MCNC: 7.2 G/DL — SIGNIFICANT CHANGE UP (ref 6–8.3)
PROT SERPL-MCNC: 7.2 G/DL — SIGNIFICANT CHANGE UP (ref 6–8.3)
PROT UR-MCNC: SIGNIFICANT CHANGE UP MG/DL
PROT UR-MCNC: SIGNIFICANT CHANGE UP MG/DL
RBC # BLD: 4.43 M/UL — SIGNIFICANT CHANGE UP (ref 4.2–5.8)
RBC # BLD: 4.43 M/UL — SIGNIFICANT CHANGE UP (ref 4.2–5.8)
RBC # FLD: 13.2 % — SIGNIFICANT CHANGE UP (ref 10.3–14.5)
RBC # FLD: 13.2 % — SIGNIFICANT CHANGE UP (ref 10.3–14.5)
SODIUM SERPL-SCNC: 140 MMOL/L — SIGNIFICANT CHANGE UP (ref 135–145)
SODIUM SERPL-SCNC: 140 MMOL/L — SIGNIFICANT CHANGE UP (ref 135–145)
SP GR SPEC: 1.03 — SIGNIFICANT CHANGE UP (ref 1–1.03)
SP GR SPEC: 1.03 — SIGNIFICANT CHANGE UP (ref 1–1.03)
UROBILINOGEN FLD QL: 1 MG/DL — SIGNIFICANT CHANGE UP (ref 0.2–1)
UROBILINOGEN FLD QL: 1 MG/DL — SIGNIFICANT CHANGE UP (ref 0.2–1)
WBC # BLD: 6.89 K/UL — SIGNIFICANT CHANGE UP (ref 3.8–10.5)
WBC # BLD: 6.89 K/UL — SIGNIFICANT CHANGE UP (ref 3.8–10.5)
WBC # FLD AUTO: 6.89 K/UL — SIGNIFICANT CHANGE UP (ref 3.8–10.5)
WBC # FLD AUTO: 6.89 K/UL — SIGNIFICANT CHANGE UP (ref 3.8–10.5)

## 2023-11-12 PROCEDURE — 80053 COMPREHEN METABOLIC PANEL: CPT

## 2023-11-12 PROCEDURE — 85025 COMPLETE CBC W/AUTO DIFF WBC: CPT

## 2023-11-12 PROCEDURE — G1004: CPT

## 2023-11-12 PROCEDURE — 74176 CT ABD & PELVIS W/O CONTRAST: CPT | Mod: MG

## 2023-11-12 PROCEDURE — 99284 EMERGENCY DEPT VISIT MOD MDM: CPT | Mod: 25

## 2023-11-12 PROCEDURE — 96374 THER/PROPH/DIAG INJ IV PUSH: CPT

## 2023-11-12 PROCEDURE — 74176 CT ABD & PELVIS W/O CONTRAST: CPT | Mod: 26,MG

## 2023-11-12 PROCEDURE — 81003 URINALYSIS AUTO W/O SCOPE: CPT

## 2023-11-12 PROCEDURE — 99284 EMERGENCY DEPT VISIT MOD MDM: CPT

## 2023-11-12 PROCEDURE — 36415 COLL VENOUS BLD VENIPUNCTURE: CPT

## 2023-11-12 RX ORDER — METHOCARBAMOL 500 MG/1
2 TABLET, FILM COATED ORAL
Qty: 30 | Refills: 0
Start: 2023-11-12 | End: 2023-11-16

## 2023-11-12 RX ORDER — KETOROLAC TROMETHAMINE 30 MG/ML
15 SYRINGE (ML) INJECTION ONCE
Refills: 0 | Status: DISCONTINUED | OUTPATIENT
Start: 2023-11-12 | End: 2023-11-12

## 2023-11-12 RX ORDER — METHOCARBAMOL 500 MG/1
750 TABLET, FILM COATED ORAL ONCE
Refills: 0 | Status: COMPLETED | OUTPATIENT
Start: 2023-11-12 | End: 2023-11-12

## 2023-11-12 RX ORDER — SODIUM CHLORIDE 9 MG/ML
1000 INJECTION INTRAMUSCULAR; INTRAVENOUS; SUBCUTANEOUS ONCE
Refills: 0 | Status: COMPLETED | OUTPATIENT
Start: 2023-11-12 | End: 2023-11-12

## 2023-11-12 RX ORDER — IBUPROFEN 200 MG
1 TABLET ORAL
Qty: 30 | Refills: 0
Start: 2023-11-12

## 2023-11-12 RX ORDER — OXYCODONE AND ACETAMINOPHEN 5; 325 MG/1; MG/1
1 TABLET ORAL ONCE
Refills: 0 | Status: DISCONTINUED | OUTPATIENT
Start: 2023-11-12 | End: 2023-11-12

## 2023-11-12 RX ORDER — LIDOCAINE 4 G/100G
1 CREAM TOPICAL ONCE
Refills: 0 | Status: COMPLETED | OUTPATIENT
Start: 2023-11-12 | End: 2023-11-12

## 2023-11-12 RX ADMIN — SODIUM CHLORIDE 1000 MILLILITER(S): 9 INJECTION INTRAMUSCULAR; INTRAVENOUS; SUBCUTANEOUS at 16:44

## 2023-11-12 RX ADMIN — METHOCARBAMOL 750 MILLIGRAM(S): 500 TABLET, FILM COATED ORAL at 20:27

## 2023-11-12 RX ADMIN — Medication 15 MILLIGRAM(S): at 16:44

## 2023-11-12 RX ADMIN — LIDOCAINE 1 PATCH: 4 CREAM TOPICAL at 20:26

## 2023-11-12 RX ADMIN — OXYCODONE AND ACETAMINOPHEN 1 TABLET(S): 5; 325 TABLET ORAL at 17:26

## 2023-11-12 NOTE — ED PROVIDER NOTE - PROGRESS NOTE DETAILS
Neva: D/w pt results, no stones, neg labs, neg UA, as well as incidental findings of lymph nodes needing further evaluation as outpt. Provided a copy of his results and instructed on f/u. Abd soft, NT, ND. No CVAT. + paraspinal mm ttp. Likely MSK. Analagesia, f/u PCP

## 2023-11-12 NOTE — ED PROVIDER NOTE - NSICDXPASTSURGICALHX_GEN_ALL_CORE_FT
PAST SURGICAL HISTORY:  Ankle fracture, right 5 yrs ago tx @[facility] with plates and sharron    S/P tonsillectomy

## 2023-11-12 NOTE — ED ADULT NURSE NOTE - OBJECTIVE STATEMENT
R flank pain without fevers/chills and with hematuria. Pt denies N/V/D. Denies dizziness, weakness, syncope. Pt alert, oriented, and ambulatory at time of assessment. Denies abdominal pain. Pain localized to R flank and does not extend to the R leg.

## 2023-11-12 NOTE — ED PROVIDER NOTE - ATTENDING APP SHARED VISIT CONTRIBUTION OF CARE
69 y/o male w/ hx kidney stone, hypothyroidism, bph, spinal stenosis s/p L5/S1 microdiscectomy on 6/29/23 p/w constant R flank pain with waxing/waning intensities x 3 days, which feels similar to prior kidney stone.  Notes reddish urine this morning.  Noted mild abd discomfort with onset of symptoms  Denies recent trauma/injury.  Denies f/c, cp, sob, n/v/d, dysuria.    Exam with mild R cva ttp  Suspect likely kidney stone vs pyelo vs msk pain  Will check labs, ua, ctap  Pain meds, IVF, re-eval    Agree with above note as documented by PA.  I was available as the supervising attending during patient's ER evaluation.    Pt presenting with right flank pain with hx of stones in past - nad nontoxic appearing, mild right cva tenderness  Labs wnl  Signed out to Dr. Hooks pending ct abd/pelvis results.

## 2023-11-12 NOTE — ED PROVIDER NOTE - PATIENT PORTAL LINK FT
You can access the FollowMyHealth Patient Portal offered by [facility] by registering at the following website: [email or web address]. By joining [facility]’s FollowMyHealth portal, you will also be able to view your health information using other applications (apps) compatible with our system.

## 2023-11-12 NOTE — ED PROVIDER NOTE - CLINICAL SUMMARY MEDICAL DECISION MAKING FREE TEXT BOX
67 y/o male w/ hx kidney stone, hypothyroidism, bph, spinal stenosis s/p L5/S1 microdiscectomy on 6/29/23 p/w constant R flank pain with waxing/waning intensities x 3 days, which feels similar to prior kidney stone.  Notes reddish urine this morning.  Noted mild abd discomfort with onset of symptoms  Denies recent trauma/injury.  Denies f/c, cp, sob, n/v/d, dysuria.    Exam with mild R cva ttp  Suspect likely kidney stone vs pyelo vs msk pain  Will check labs, ua, ctap  Pain meds, IVF, re-eval

## 2023-11-12 NOTE — ED PROVIDER NOTE - NSFOLLOWUPINSTRUCTIONS_ED_ALL_ED_FT
Thank you for visiting [facility] Emergency Department.      We saw you today for _____.     PAIN CONTROL:   You may take ibuprofen (Motrin, Advil) 600 mg (3 regular tablets) every 6 hours as needed for pain.  Please take with food.  Stop taking if you develop abdominal pain, dark/ bloody stools.  Do not mix with other NSAIDS (ie. Naproxen, Aleve, Celecoxib).  You may also take acetaminophen (Tylenol) 650-975mg (2-3 regular tablets) or 500-1000mg (1-2 extra strength tablets) every 6 hours as needed for pain.  Do not exceed 4000 mg in 1 day. These medications may be bought over the counter.    I recommend alternating the Ibuprofen and Tylenol so you are getting medications around the clock.  For example take the Ibuprofen, then 3 hours later take the Tylenol, then 3 hours later take the Ibuprofen, and repeat as needed.    Please follow up with _____ in 1 week for re-evaluation.   Please know that no emergency visit is complete without follow-up with your primary care provider in 1 week.  Please bring copies of all discharge papers and results and show to your doctor.      Please continue taking all previous medications as instructed unless we discussed otherwise.     I appreciated your patience and hope you feel better soon.     Return to ER immediately if you develop fevers, chills, chest pain, shortness of breath, worsening and/or any concerning symptoms. Thank you for visiting [facility] Emergency Department.      We saw you today for flank / back pain. You blood work and urinalysis were within normal limits. You had CT of the  abdomen and pelvis which did not show any stones.     You had incidental findings of enlarged lymph nodes in the abdomen, of unclear significant. You were given a copy of you results. Bring these results to your primary doctor for appropriate follow up testing and further evaluation.     You are being treated for musculoskeletal back pain.     You have been prescribed the following medications:  1. High-dose Ibuprofen (anti-inflammatory; take with food; other anti-inflammatories include Motrin, Advil, Naprosyn, Aleve, Aspirin - do not take multiple anti-inflammatories, choose ONE)  2. Robaxin (muscle relaxant; may cause sedation, use with caution)    You can also take over-the-counter topical medications (not filled by prescription) include: lidocaine patches, Icy Hot, Chas Hendricks  You may also apply heat to the affected area.      I appreciated your patience and hope you feel better soon.     Return to ER immediately if you develop fevers, chills, chest pain, shortness of breath, worsening and/or any concerning symptoms.

## 2023-11-12 NOTE — ED PROVIDER NOTE - NS ED ROS FT
CONSTITUTIONAL: Denies fever and chills    RESPIRATORY: Denies SOB    CARDIOVASCULAR: Denies chest pain.    GASTROINTESTINAL: Mild abdominal pain.  Denies nausea, vomiting and diarrhea    GENITOURINARY: +red urine.  Denies dysuria    MUSCULOSKELETAL: +R back back

## 2023-11-12 NOTE — ED PROVIDER NOTE - OBJECTIVE STATEMENT
69 y/o male w/ hx kidney stone, hypothyroidism, bph, spinal stenosis s/p L5/S1 microdiscectomy on 6/29/23 p/w constant R flank pain with waxing/waning intensities x 3 days, which feels similar to prior kidney stone.  Notes reddish urine this morning.  Noted mild abd discomfort with onset of symptoms  Denies recent trauma/injury.  Denies f/c, cp, sob, n/v/d, dysuria.

## 2023-11-12 NOTE — ED PROVIDER NOTE - PHYSICAL EXAMINATION
CONSTITUTIONAL: Awake, alert.  Nontoxic, no acute distress.    HEAD: Normocephalic, atraumatic.    EYES: Conjunctivae clear without exudates or hemorrhage. Sclera is non-icteric.    ENT: Normal appearing external ears, nose, mucous membranes moist.    NECK: supple, trachea midline.    HEART:  Normal rate, regular rhythm.  Heart sounds S1, S2.  No murmurs, rubs or gallops.    LUNGS:  No acute respiratory distress.  Non-tachypneic and non-labored.  Lungs are clear bilaterally with good aeration.  No wheezing, rales, rhonchi.    ABDOMEN: Normal appearing skin without lesions, rashes.  Normal bowel sounds x 4.  Soft, non-distended, non-tender in all four quadrants. No rebound or guarding. No hernias or masses palpable.  No pulsatile abdominal mass.   Mild R CVA ttp    MUSCULOSKELETAL:  Moving all extremities without issue.  5/5 strength b/l lower ext.  SILT.  Ambulating steadily    SKIN: Skin in warm, dry and intact without rashes or lesions.  Appropriate color for ethnicity.    NEUROLOGICAL:  Patient is alert, oriented x person, place and time.    PSYCH: Appropriate mood and affect. Good judgment and insight.

## 2023-11-16 DIAGNOSIS — R10.9 UNSPECIFIED ABDOMINAL PAIN: ICD-10-CM

## 2023-11-16 DIAGNOSIS — R31.9 HEMATURIA, UNSPECIFIED: ICD-10-CM

## 2023-11-16 DIAGNOSIS — M54.9 DORSALGIA, UNSPECIFIED: ICD-10-CM

## 2023-12-12 ENCOUNTER — APPOINTMENT (OUTPATIENT)
Dept: PULMONOLOGY | Facility: CLINIC | Age: 68
End: 2023-12-12

## 2023-12-12 ENCOUNTER — NON-APPOINTMENT (OUTPATIENT)
Age: 68
End: 2023-12-12

## 2023-12-12 VITALS — WEIGHT: 160 LBS | BODY MASS INDEX: 21.67 KG/M2 | HEIGHT: 72 IN

## 2023-12-12 DIAGNOSIS — F17.210 NICOTINE DEPENDENCE, CIGARETTES, UNCOMPLICATED: ICD-10-CM

## 2023-12-20 RX ORDER — METHOCARBAMOL 500 MG/1
500 TABLET, FILM COATED ORAL 3 TIMES DAILY
Qty: 42 | Refills: 1 | Status: ACTIVE | COMMUNITY
Start: 2023-12-20 | End: 1900-01-01

## 2023-12-27 ENCOUNTER — OUTPATIENT (OUTPATIENT)
Dept: OUTPATIENT SERVICES | Facility: HOSPITAL | Age: 68
LOS: 1 days | End: 2023-12-27
Payer: MEDICARE

## 2023-12-27 ENCOUNTER — APPOINTMENT (OUTPATIENT)
Dept: CT IMAGING | Facility: HOSPITAL | Age: 68
End: 2023-12-27

## 2023-12-27 DIAGNOSIS — S82.891A OTHER FRACTURE OF RIGHT LOWER LEG, INITIAL ENCOUNTER FOR CLOSED FRACTURE: Chronic | ICD-10-CM

## 2023-12-27 DIAGNOSIS — Z90.89 ACQUIRED ABSENCE OF OTHER ORGANS: Chronic | ICD-10-CM

## 2023-12-27 PROCEDURE — 71271 CT THORAX LUNG CANCER SCR C-: CPT | Mod: 26

## 2023-12-27 PROCEDURE — 71271 CT THORAX LUNG CANCER SCR C-: CPT

## 2024-01-03 DIAGNOSIS — I77.810 THORACIC AORTIC ECTASIA: ICD-10-CM

## 2024-01-05 ENCOUNTER — APPOINTMENT (OUTPATIENT)
Dept: CARDIOTHORACIC SURGERY | Facility: CLINIC | Age: 69
End: 2024-01-05
Payer: MEDICARE

## 2024-01-05 ENCOUNTER — NON-APPOINTMENT (OUTPATIENT)
Age: 69
End: 2024-01-05

## 2024-01-05 VITALS
OXYGEN SATURATION: 95 % | TEMPERATURE: 97.7 F | RESPIRATION RATE: 13 BRPM | SYSTOLIC BLOOD PRESSURE: 105 MMHG | BODY MASS INDEX: 20.72 KG/M2 | WEIGHT: 153 LBS | DIASTOLIC BLOOD PRESSURE: 65 MMHG | HEART RATE: 101 BPM | HEIGHT: 72 IN

## 2024-01-05 DIAGNOSIS — I71.21 ANEURYSM OF THE ASCENDING AORTA, WITHOUT RUPTURE: ICD-10-CM

## 2024-01-05 PROCEDURE — 99204 OFFICE O/P NEW MOD 45 MIN: CPT

## 2024-01-10 NOTE — ASSESSMENT
[FreeTextEntry1] : 69 yo M with FHx of CAD (Father  at 68 yo, ? [place]), PMHx of HTN, HLD (declines statin), PreDM, BPH, hypothyroidism and left lumbar radiculopathy (s/p L5-S1 microdiscectomy 2023) who presents for surgical consultation for management of ascending aortic dilatation noted on routine CT chest for screening.   Dr. Pagan reviewed the indications for surgery and used our webpage www.heartprocedures.org http://www.heartprocedures.org to illustrate the aorta and anatomy of the heart. Those indications are the following: size greater than 5.0 cm, symptomatic aneurysms, family history of aortic dissection or aneurysm death with a size greater than 4.5 cm, other necessary cardiac procedures such as coronary artery bypass grafting or valvular disorders with an aneurysm greater than 4.5 cm, or connective tissue disorders with an aneurysm size greater than 4.5 cm. The patient does not meet size criteria for surgical intervention at the time.   Dr. Pagan discussed activity restrictions with the patient and would advise exercise at a moderate amount with no heavy lifting over one third of body weight, and avoiding heart rates that exceed 140 beats per minute. In addition, every patient should abstain from tobacco abuse and to avoid all illicit drug use, especially stimulants such as cocaine or methamphetamine. Dr. Pagan also counseled regarding maintaining a healthy heart diet and losing any excessive weight as this also put undue stress on both the aorta and entire cardiovascular system. First degree family members should be screened for bicuspid valve disease and ascending aortic aneurysms.   - Pt to be scheduled for an ECHO and referred to Dr. Reyes for evaluation of coronary calcifications seen on CT chest.  - [facility] up in [facility] 1 year with CTA chest and ECHO.  - Continue medication regimen. - Follow up with PCP. - Blood pressure management. - Discussed signs and symptoms that warrant emergency medical attention.

## 2024-01-10 NOTE — PHYSICAL EXAM
[General Appearance - Alert] : alert [General Appearance - In No Acute Distress] : in no acute distress [Sclera] : the sclera and conjunctiva were normal [PERRL With Normal Accommodation] : pupils were equal in size, round, and reactive to light [Extraocular Movements] : extraocular movements were intact [Outer Ear] : the ears and nose were normal in appearance [Neck Appearance] : the appearance of the neck was normal [Neck Cervical Mass (___cm)] : no neck mass was observed [Jugular Venous Distention Increased] : there was no jugular-venous distention [] : no respiratory distress [Auscultation Breath Sounds / Voice Sounds] : lungs were clear to auscultation bilaterally [Heart Rate And Rhythm] : heart rate was normal and rhythm regular [Heart Sounds] : normal S1 and S2 [Heart Sounds Gallop] : no gallops [Murmurs] : no murmurs [Heart Sounds Pericardial Friction Rub] : no pericardial rub [2+] : left 2+ [Bowel Sounds] : normal bowel sounds [Abdomen Soft] : soft [Abnormal Walk] : normal gait [Skin Color & Pigmentation] : normal skin color and pigmentation [Cranial Nerves] : cranial nerves 2-12 were intact [Oriented To Time, Place, And Person] : oriented to person, place, and time [Right Carotid Bruit] : no bruit heard over the right carotid [Left Carotid Bruit] : no bruit heard over the left carotid [FreeTextEntry1] : Deferred

## 2024-01-10 NOTE — HISTORY OF PRESENT ILLNESS
[FreeTextEntry1] : 67 yo M with FHx of CAD (Father  at 68 yo, ? [place]), PMHx of HTN, HLD (declines statin), PreDM, BPH, hypothyroidism and left lumbar radiculopathy (s/p L5-S1 microdiscectomy 2023) who presents for surgical consultation for management of ascending aortic dilatation noted on routine CT chest for screening.  Pt is under the care of Dr. Sorensen.   CT Chest 23:  4 mm pleural-based nodule superior segment right lower lobe on image 186 is centrally calcified, consistent with benign etiology. Severe coronary artery calcification. Mild calcified plaque aorta. No change mildly aneurysmal ascending aorta, measuring 4.2 cm.  Pt endorses chronic back pain otherwise he is in his usual state of health and denies any symptoms. Pt denies fever, chills, fatigue, headache, blurred vision, dizziness, syncope, chest pain, palpitations, shortness of breath, orthopnea, paroxysmal nocturnal dyspnea, nausea, vomiting, abdominal pain, back pain, BRBPR or swelling to legs.

## 2024-01-10 NOTE — END OF VISIT
[Time Spent: ___ minutes] : I have spent [unfilled] minutes of time on the encounter. [FreeTextEntry3] :   I, Dr. Miguel Pagan, personally performed the evaluation and management (E/M) services for this new patient.  That E/M includes conducting the clinically appropriate initial history &/or exam, assessing all conditions, and establishing the plan of care.  Today, my LAUREL,  was here to observe &/or participate in the visit & follow plan of care established by me.

## 2024-01-11 NOTE — DISCUSSION/SUMMARY
[de-identified] : All medical record entries made by "residents name" acting as a scribe for this encounter under the direction of "Dr. Aldana." I have reviewed the chart and agree that the record accurately reflects my personal performance of the history, physical exam, assessment and plan. I have also personally directed, reviewed and agreed with the chart.

## 2024-01-11 NOTE — ASSESSMENT
[FreeTextEntry1] : 67 M s/p L L5-S1 microdiscectomy with back pain w/out radiculopathy  - WBAT - Physical therapy - f/u as needed -Pain medicine/NSAIDs otc

## 2024-01-11 NOTE — HISTORY OF PRESENT ILLNESS
[de-identified] : 67 Male s/p Left L5-S1 microdiscectomy x4 months. Last seen 4 weeks ago her for follow-up. Patient reports intial symptoms resolved. Denies radiculopathy. No other neurological symptoms. No recent trauma. Patiet reports he is able to ambulate but just with back pain.

## 2024-01-11 NOTE — PHYSICAL EXAM
[de-identified] :  Gen: NAD incision c/d/i NVID Motor/Sensory exam:: L4-S1 5/5  SILT L2-S1 2+ Dp/PT

## 2024-01-19 ENCOUNTER — NON-APPOINTMENT (OUTPATIENT)
Age: 69
End: 2024-01-19

## 2024-01-23 ENCOUNTER — EMERGENCY (EMERGENCY)
Facility: HOSPITAL | Age: 69
LOS: 1 days | Discharge: HOME | End: 2024-01-23
Admitting: EMERGENCY MEDICINE
Payer: MEDICARE

## 2024-01-23 VITALS
TEMPERATURE: 98 F | OXYGEN SATURATION: 98 % | WEIGHT: 160.06 LBS | DIASTOLIC BLOOD PRESSURE: 81 MMHG | SYSTOLIC BLOOD PRESSURE: 146 MMHG | RESPIRATION RATE: 18 BRPM | HEIGHT: 72 IN | HEART RATE: 90 BPM

## 2024-01-23 VITALS
RESPIRATION RATE: 16 BRPM | OXYGEN SATURATION: 98 % | HEART RATE: 74 BPM | TEMPERATURE: 98 F | DIASTOLIC BLOOD PRESSURE: 78 MMHG | SYSTOLIC BLOOD PRESSURE: 133 MMHG

## 2024-01-23 DIAGNOSIS — L02.511 CUTANEOUS ABSCESS OF RIGHT HAND: ICD-10-CM

## 2024-01-23 DIAGNOSIS — S82.891A OTHER FRACTURE OF RIGHT LOWER LEG, INITIAL ENCOUNTER FOR CLOSED FRACTURE: Chronic | ICD-10-CM

## 2024-01-23 DIAGNOSIS — Z90.89 ACQUIRED ABSENCE OF OTHER ORGANS: Chronic | ICD-10-CM

## 2024-01-23 DIAGNOSIS — L72.9 FOLLICULAR CYST OF THE SKIN AND SUBCUTANEOUS TISSUE, UNSPECIFIED: ICD-10-CM

## 2024-01-23 DIAGNOSIS — E03.9 HYPOTHYROIDISM, UNSPECIFIED: ICD-10-CM

## 2024-01-23 LAB
GRAM STN FLD: SIGNIFICANT CHANGE UP
SPECIMEN SOURCE: SIGNIFICANT CHANGE UP

## 2024-01-23 PROCEDURE — 10060 I&D ABSCESS SIMPLE/SINGLE: CPT

## 2024-01-23 PROCEDURE — 10061 I&D ABSCESS COMP/MULTIPLE: CPT

## 2024-01-23 PROCEDURE — 87070 CULTURE OTHR SPECIMN AEROBIC: CPT

## 2024-01-23 PROCEDURE — 87205 SMEAR GRAM STAIN: CPT

## 2024-01-23 PROCEDURE — 99284 EMERGENCY DEPT VISIT MOD MDM: CPT | Mod: 25

## 2024-01-23 NOTE — ED PROVIDER NOTE - OBJECTIVE STATEMENT
67 y/o male w/ hx kidney stone, hypothyroidism p/w 4 days of redness and increasing pain to a cyst pt has had on R hand x 2 yrs.  States tried to drain cyst himself without success.  Denies f/c, numbness/tingling/weakness to ext.

## 2024-01-23 NOTE — ED PROVIDER NOTE - NSFOLLOWUPINSTRUCTIONS_ED_ALL_ED_FT
Thank you for visiting [facility] Emergency Department.      We saw you today for an infected cyst.  Please keep wound clean and dry.   You may clean with gentle soap and water 2x/ day  Apply warm compresses.  You may change dressing 2x/day.  I sent antibiotics to your pharmacy which you only need to take if symptoms worsen.    PAIN CONTROL:   You may take ibuprofen (Motrin, Advil) 600 mg (3 regular tablets) every 6 hours as needed for pain.  Please take with food.  Stop taking if you develop abdominal pain, dark/ bloody stools.  Do not mix with other NSAIDS (ie. Naproxen, Aleve, Celecoxib).  You may also take acetaminophen (Tylenol) 650-975mg (2-3 regular tablets) or 500-1000mg (1-2 extra strength tablets) every 6 hours as needed for pain.  Do not exceed 4000 mg in 1 day. These medications may be bought over the counter.    I recommend alternating the Ibuprofen and Tylenol so you are getting medications around the clock.  For example take the Ibuprofen, then 3 hours later take the Tylenol, then 3 hours later take the Ibuprofen, and repeat as needed.    Please follow up with hand specialist/ plastic surgeon in 1 week for re-evaluation.   Please know that no emergency visit is complete without follow-up with your primary care provider in 1 week.  Please bring copies of all discharge papers and results and show to your doctor.      Please continue taking all previous medications as instructed unless we discussed otherwise.     I appreciated your patience and hope you feel better soon.     Return to ER immediately if you develop fevers, chills, chest pain, shortness of breath, worsening and/or any concerning symptoms.

## 2024-01-23 NOTE — ED ADULT NURSE NOTE - OBJECTIVE STATEMENT
67 yo c/o abscess in R hand that has gotten worse in the last few days. Alert and oriented and ambulatory independently.

## 2024-01-23 NOTE — ED ADULT NURSE NOTE - NSICDXPASTSURGICALHX_GEN_ALL_CORE_FT
PAST SURGICAL HISTORY:  Ankle fracture, right 5 yrs ago tx @[facility] with plates and sharron    S/P tonsillectomy

## 2024-01-23 NOTE — ED ADULT NURSE NOTE - NSFALLUNIVINTERV_ED_ALL_ED
Bed/Stretcher in lowest position, wheels locked, appropriate side rails in place/Call bell, personal items and telephone in reach/Instruct patient to call for assistance before getting out of bed/chair/stretcher/Non-slip footwear applied when patient is off stretcher/[place] to call system/Physically safe environment - no spills, clutter or unnecessary equipment/Purposeful proactive rounding/Room/bathroom lighting operational, light cord in reach

## 2024-01-23 NOTE — ED PROVIDER NOTE - PHYSICAL EXAMINATION
CONSTITUTIONAL: Awake, alert.  Nontoxic, no acute distress.    HEAD: Normocephalic, atraumatic.    MUSCULOSKELETAL:  +approx 2 cm cyst like structure with overlying erythema/ ttp to R dorsal surface of hand.  FROM present.  5/5 strength present.  Sensation and motor function grossly intact.  Strong equal peripheral pulses b/l.   Cap refill < 2 b/l upper and lower ext.  All compartments soft.    NEUROLOGICAL:  Patient is alert, oriented x person, place and time.    PSYCH: Appropriate mood and affect. Good judgment and insight.

## 2024-01-23 NOTE — ED PROVIDER NOTE - NSICDXPASTSURGICALHX_GEN_ALL_CORE_FT
PAST SURGICAL HISTORY:  Ankle fracture, right 5 yrs ago tx @[facility] with plates and sharron    S/P tonsillectomy

## 2024-01-23 NOTE — ED PROVIDER NOTE - CARE PROVIDER_API CALL
Edy Abreu  Plastic Surgery  [address] [place] [id number]  Phone: [phone]  Fax: [phone]  Follow Up Time:

## 2024-01-23 NOTE — ED PROVIDER NOTE - CLINICAL SUMMARY MEDICAL DECISION MAKING FREE TEXT BOX
67 y/o male w/ hx kidney stone, hypothyroidism p/w 4 days of redness and increasing pain to a cyst pt has had on R hand x 2 yrs.  States tried to drain cyst himself without success.  Denies f/c, numbness/tingling/weakness to ext.  Exam with +approx 2 cm cyst like structure with overlying erythema/ ttp to R dorsal surface of hand.   FROM. NVI  I&D performed without issue  Pt requesting abx sent to pharm in case sx worsen  D/w pt will send, but advised not to start unless pain worsens/redness worsens  Strict return precautions provided  Advised close f/u with derm/ or plastics for definitive removal of cyst

## 2024-01-23 NOTE — ED PROVIDER NOTE - PATIENT PORTAL LINK FT
You can access the FollowMyHealth Patient Portal offered by [facility] by registering at the following website: [email or web address]. By joining [facility]’s FollowMyHealth portal, you will also be able to view your health information using other applications (apps) compatible with our system.

## 2024-01-23 NOTE — ED ADULT TRIAGE NOTE - CHIEF COMPLAINT QUOTE
Pt reports redness and pain to R hand cyst for 4 days. Pt reports he had cyst x2 years. Pt states he tried to kieran it at home himself without any relief.

## 2024-01-23 NOTE — ED ADULT TRIAGE NOTE - AS TEMP SITE
Diet, Consistent Carbohydrate/No Snacks:   DASH/TLC {Sodium & Cholesterol Restricted} (DASH) (03-30-22 @ 08:41) [Active]      
oral

## 2024-01-28 LAB
CULTURE RESULTS: ABNORMAL
GRAM STN FLD: ABNORMAL
SPECIMEN SOURCE: SIGNIFICANT CHANGE UP

## 2024-02-27 ENCOUNTER — RESULT REVIEW (OUTPATIENT)
Age: 69
End: 2024-02-27

## 2024-02-27 ENCOUNTER — OUTPATIENT (OUTPATIENT)
Dept: OUTPATIENT SERVICES | Facility: HOSPITAL | Age: 69
LOS: 1 days | End: 2024-02-27
Payer: MEDICARE

## 2024-02-27 DIAGNOSIS — Z90.89 ACQUIRED ABSENCE OF OTHER ORGANS: Chronic | ICD-10-CM

## 2024-02-27 DIAGNOSIS — I71.21 ANEURYSM OF THE ASCENDING AORTA, WITHOUT RUPTURE: ICD-10-CM

## 2024-02-27 DIAGNOSIS — S82.891A OTHER FRACTURE OF RIGHT LOWER LEG, INITIAL ENCOUNTER FOR CLOSED FRACTURE: Chronic | ICD-10-CM

## 2024-02-27 PROCEDURE — 93306 TTE W/DOPPLER COMPLETE: CPT

## 2024-02-27 PROCEDURE — 93306 TTE W/DOPPLER COMPLETE: CPT | Mod: 26

## 2024-03-25 NOTE — PHYSICAL EXAM
[Well Developed] : well developed [Normal S1, S2] : normal S1, S2 [No Murmur] : no murmur [Clear Lung Fields] : clear lung fields [Good Air Entry] : good air entry [Normal Gait] : normal gait [Soft] : abdomen soft [No Edema] : no edema [Moves all extremities] : moves all extremities [Alert and Oriented] : alert and oriented

## 2024-03-28 ENCOUNTER — NON-APPOINTMENT (OUTPATIENT)
Age: 69
End: 2024-03-28

## 2024-03-28 ENCOUNTER — APPOINTMENT (OUTPATIENT)
Dept: HEART AND VASCULAR | Facility: CLINIC | Age: 69
End: 2024-03-28
Payer: MEDICARE

## 2024-03-28 VITALS
HEIGHT: 72 IN | SYSTOLIC BLOOD PRESSURE: 136 MMHG | OXYGEN SATURATION: 99 % | WEIGHT: 162 LBS | DIASTOLIC BLOOD PRESSURE: 74 MMHG | BODY MASS INDEX: 21.94 KG/M2 | HEART RATE: 84 BPM

## 2024-03-28 DIAGNOSIS — I20.89 OTHER FORMS OF ANGINA PECTORIS: ICD-10-CM

## 2024-03-28 DIAGNOSIS — R07.9 CHEST PAIN, UNSPECIFIED: ICD-10-CM

## 2024-03-28 PROCEDURE — 99204 OFFICE O/P NEW MOD 45 MIN: CPT | Mod: 25

## 2024-03-28 PROCEDURE — 93000 ELECTROCARDIOGRAM COMPLETE: CPT

## 2024-03-29 LAB
ALBUMIN SERPL ELPH-MCNC: 4.7 G/DL
ALP BLD-CCNC: 94 U/L
ALT SERPL-CCNC: 22 U/L
ANION GAP SERPL CALC-SCNC: 13 MMOL/L
AST SERPL-CCNC: 24 U/L
BASOPHILS # BLD AUTO: 0.03 K/UL
BASOPHILS NFR BLD AUTO: 0.5 %
BILIRUB SERPL-MCNC: 0.5 MG/DL
BUN SERPL-MCNC: 10 MG/DL
CALCIUM SERPL-MCNC: 9.8 MG/DL
CHLORIDE SERPL-SCNC: 103 MMOL/L
CHOLEST SERPL-MCNC: 192 MG/DL
CO2 SERPL-SCNC: 24 MMOL/L
CREAT SERPL-MCNC: 0.8 MG/DL
CRP SERPL-MCNC: <3 MG/L
EGFR: 97 ML/MIN/1.73M2
EOSINOPHIL # BLD AUTO: 0.07 K/UL
EOSINOPHIL NFR BLD AUTO: 1.3 %
ERYTHROCYTE [SEDIMENTATION RATE] IN BLOOD BY WESTERGREN METHOD: 8 MM/HR
ESTIMATED AVERAGE GLUCOSE: 103 MG/DL
GLUCOSE SERPL-MCNC: 100 MG/DL
HBA1C MFR BLD HPLC: 5.2 %
HCT VFR BLD CALC: 45.5 %
HDLC SERPL-MCNC: 63 MG/DL
HGB BLD-MCNC: 14.9 G/DL
IMM GRANULOCYTES NFR BLD AUTO: 0.2 %
LDLC SERPL CALC-MCNC: 112 MG/DL
LYMPHOCYTES # BLD AUTO: 1.36 K/UL
LYMPHOCYTES NFR BLD AUTO: 24.5 %
MAN DIFF?: NORMAL
MCHC RBC-ENTMCNC: 31.6 PG
MCHC RBC-ENTMCNC: 32.7 GM/DL
MCV RBC AUTO: 96.4 FL
MONOCYTES # BLD AUTO: 0.47 K/UL
MONOCYTES NFR BLD AUTO: 8.5 %
NEUTROPHILS # BLD AUTO: 3.61 K/UL
NEUTROPHILS NFR BLD AUTO: 65 %
NONHDLC SERPL-MCNC: 129 MG/DL
PLATELET # BLD AUTO: 230 K/UL
POTASSIUM SERPL-SCNC: 4.3 MMOL/L
PROT SERPL-MCNC: 7.1 G/DL
RBC # BLD: 4.72 M/UL
RBC # FLD: 13.3 %
SODIUM SERPL-SCNC: 139 MMOL/L
TRIGL SERPL-MCNC: 99 MG/DL
WBC # FLD AUTO: 5.55 K/UL

## 2024-04-02 NOTE — ASSESSMENT
[FreeTextEntry1] : IMPRESSION and PLAN:  # Severe coronary calcifications on screening CT chest - Has some dyspnea on exertion and chest discomfort - TTE showing normal LV size and function, no significant valvular disease - Will get dedicated CCTA to assess for CAD - Will check full lab work, Hba1c, and lipid panel  (patient requesting communication of results via email [email or web address])  # Ascending aorta dilatation to 4.2cm - Follows with CT surgery Dr. Pagan - Planned for follow up CT and TTE in 1 year to monitor change in size - BP control  # Dyslipidemia - 7/2023:  Total 197, HDL 55 - Advise starting statins given severe coronary calcifications but patient prefers not to take statins at the moment - low fat diet - Will check repeat lipid panel  # HTN - low salt diet - Continue amlodipine - Home BP log  # Anxiety  - Patient wants to discuss anti-anxiety medications - Would refer to PMD  Follow up in April after testing

## 2024-04-02 NOTE — REVIEW OF SYSTEMS
[Dyspnea on exertion] : dyspnea during exertion [Chest Discomfort] : chest discomfort [Fever] : no fever [Chills] : no chills [SOB] : no shortness of breath [Sore Throat] : no sore throat [Leg Claudication] : no intermittent leg claudication [Lower Ext Edema] : no extremity edema [Palpitations] : no palpitations [Orthopnea] : no orthopnea [PND] : no PND [Cough] : no cough [Abdominal Pain] : no abdominal pain

## 2024-04-02 NOTE — HISTORY OF PRESENT ILLNESS
[FreeTextEntry1] : 69 yo male patient with PMHx of HTN, DL, pre-DM, BPH, hypothyroidism, lumbar radiculopathy, presenting to establish cardiac care. Patient had screening CT chest, which showed Asc aorta 4.2cm and severe calcifications in the coronaries, referred for cardiac evaluation Patient reports intermittent chest discomfort and some dyspnea on exertion, no orthopnea, no PND, no MARK, no palpitations

## 2024-04-02 NOTE — CARDIOLOGY SUMMARY
[de-identified] : ECG 2/2023: Sinus rhythm at 78bpm [de-identified] : TTE 2/27/24: Normal RV and LV size and function, Aortic valve sclerosis without stenosis [de-identified] : CT chest cancer screening: Ascending aorta 4.2 cm, severe coronary calcifications [de-identified] : 7/2023: , HDL 55, total 197  Cr 0.7

## 2024-04-23 ENCOUNTER — OUTPATIENT (OUTPATIENT)
Dept: OUTPATIENT SERVICES | Facility: HOSPITAL | Age: 69
LOS: 1 days | End: 2024-04-23
Payer: MEDICARE

## 2024-04-23 ENCOUNTER — APPOINTMENT (OUTPATIENT)
Dept: CT IMAGING | Facility: HOSPITAL | Age: 69
End: 2024-04-23

## 2024-04-23 DIAGNOSIS — Z90.89 ACQUIRED ABSENCE OF OTHER ORGANS: Chronic | ICD-10-CM

## 2024-04-23 DIAGNOSIS — S82.891A OTHER FRACTURE OF RIGHT LOWER LEG, INITIAL ENCOUNTER FOR CLOSED FRACTURE: Chronic | ICD-10-CM

## 2024-04-23 PROCEDURE — 75574 CT ANGIO HRT W/3D IMAGE: CPT | Mod: 26

## 2024-04-23 PROCEDURE — 82565 ASSAY OF CREATININE: CPT

## 2024-04-23 PROCEDURE — 75574 CT ANGIO HRT W/3D IMAGE: CPT

## 2024-04-24 ENCOUNTER — RESULT REVIEW (OUTPATIENT)
Age: 69
End: 2024-04-24

## 2024-04-24 ENCOUNTER — OUTPATIENT (OUTPATIENT)
Dept: OUTPATIENT SERVICES | Facility: HOSPITAL | Age: 69
LOS: 1 days | End: 2024-04-24
Payer: MEDICARE

## 2024-04-24 DIAGNOSIS — S82.891A OTHER FRACTURE OF RIGHT LOWER LEG, INITIAL ENCOUNTER FOR CLOSED FRACTURE: Chronic | ICD-10-CM

## 2024-04-24 DIAGNOSIS — Z90.89 ACQUIRED ABSENCE OF OTHER ORGANS: Chronic | ICD-10-CM

## 2024-04-24 PROCEDURE — 75580 N-INVAS EST C FFR SW ALY CTA: CPT

## 2024-04-24 PROCEDURE — 75580 N-INVAS EST C FFR SW ALY CTA: CPT | Mod: 26

## 2024-04-25 ENCOUNTER — APPOINTMENT (OUTPATIENT)
Dept: HEART AND VASCULAR | Facility: CLINIC | Age: 69
End: 2024-04-25

## 2024-05-03 ENCOUNTER — APPOINTMENT (OUTPATIENT)
Dept: ENDOCRINOLOGY | Facility: CLINIC | Age: 69
End: 2024-05-03
Payer: MEDICARE

## 2024-05-03 VITALS
HEART RATE: 92 BPM | WEIGHT: 167 LBS | BODY MASS INDEX: 22.62 KG/M2 | OXYGEN SATURATION: 97 % | SYSTOLIC BLOOD PRESSURE: 136 MMHG | HEIGHT: 72 IN | DIASTOLIC BLOOD PRESSURE: 83 MMHG | TEMPERATURE: 97 F

## 2024-05-03 LAB — POCT ISTAT CREATININE: 0.8 MG/DL — SIGNIFICANT CHANGE UP (ref 0.5–1.3)

## 2024-05-03 PROCEDURE — 99203 OFFICE O/P NEW LOW 30 MIN: CPT

## 2024-05-03 NOTE — HISTORY OF PRESENT ILLNESS
[FreeTextEntry1] : Patient is a 69 yo man with hypothyroidism here for refill of metformin.  Patient states that at age 60 he was started on metformin for a rash of symptoms including headache, dizziness and a "whole list of things that improved while on metformin."  He states that he did not have a diagnosis of diabetes but states blood sugar go all over the place.  During some tests around 10-15 years ago had high blood sugar and was considered to be "borderline diabetic." Breakfast: has a light breakfast, no set pattern; generally eats when he has an appetite.  Examples include two scrambled eggs, egg sandwich, toast and tea.  Used to drink a lot of orange juice and apple juice but decreased it a few years Drinks a lot of water, no processed foods; eats organic foods, lots of vegetables, lean proteins, fish; a little chicken and ham On metformin, he was able to bring his weight down. At the start of metformin he was 195 lbs and got it down to 165 lbs with metformin. The patient ran out of metformin 1.5 years ago.  Cardiologist referred him here for metformin as no one will prescribe it to him. Rare alcohol, social drinker Headaches and dizziness that occur at random.  He is always on the look out for a pattern but has not found one yet.     Hypothyroidism diagnosed 2014. Was told he had life long low thyroid. Current takes levothyroxine 88 mcg po daily Has not had thyroid levels done Mother with hyperthyroidism No exposures to lithium/amiodarone/radiation

## 2024-05-03 NOTE — REVIEW OF SYSTEMS
[Headaches] : headaches [Dizziness] : dizziness [Chest Pain] : no chest pain [Slow Heart Rate] : heart rate is not slow [Palpitations] : no palpitations [Fast Heart Rate] : heart rate is not fast [Nausea] : no nausea [Constipation] : no constipation [Vomiting] : no vomiting [Diarrhea] : no diarrhea [Cold Intolerance] : no cold intolerance

## 2024-05-03 NOTE — ASSESSMENT
[FreeTextEntry1] : Patient is a 67 yo man who would like a prescription for metformin.  1. Hypothyroid requests metformin -the patient ran out of metformin about 1.5 years ago and says no doctor will refill it.  He stated that I was a "typical doctor and sucks" and walked out before exam.  He was prescribed metformin years ago but has no known diabetes or prediabetes.  Says sugars can go up and down and he experiences headaches and dizziness at random times.  He feels metformin can help with a whole variety of things.  Does not check sugars. Has no prediabetes with March 2024 A1c of 5.2% and previous A1c of 5.3% July 2023.  -I explained that metformin is approved for type 2 diabetes with off-label use for prediabetes, PCOS and overweight/obesity.  He stated he knew these things already and again before additional testing OGTT, glucometer checks, etc. could be offered, he walked off exam table and left. -in order to elucidate whether his headache and dizziness could be due to hypothyroidism, I offered him lab draw. He stated he was not opposed to a lab draw but once metformin wasn't going to be prescribed, he walked out of the office. I have informed  MG regarding this visit as well

## 2024-05-21 ENCOUNTER — NON-APPOINTMENT (OUTPATIENT)
Age: 69
End: 2024-05-21

## 2024-05-21 ENCOUNTER — APPOINTMENT (OUTPATIENT)
Dept: INTERNAL MEDICINE | Facility: CLINIC | Age: 69
End: 2024-05-21
Payer: MEDICARE

## 2024-05-21 VITALS
OXYGEN SATURATION: 95 % | WEIGHT: 164.13 LBS | DIASTOLIC BLOOD PRESSURE: 75 MMHG | SYSTOLIC BLOOD PRESSURE: 135 MMHG | HEIGHT: 72 IN | BODY MASS INDEX: 22.23 KG/M2 | HEART RATE: 95 BPM

## 2024-05-21 DIAGNOSIS — E03.9 HYPOTHYROIDISM, UNSPECIFIED: ICD-10-CM

## 2024-05-21 DIAGNOSIS — I10 ESSENTIAL (PRIMARY) HYPERTENSION: ICD-10-CM

## 2024-05-21 DIAGNOSIS — N40.0 BENIGN PROSTATIC HYPERPLASIA WITHOUT LOWER URINARY TRACT SYMPMS: ICD-10-CM

## 2024-05-21 PROCEDURE — G2211 COMPLEX E/M VISIT ADD ON: CPT | Mod: NC,1L

## 2024-05-21 PROCEDURE — 99214 OFFICE O/P EST MOD 30 MIN: CPT | Mod: 25

## 2024-05-21 RX ORDER — METHOCARBAMOL 500 MG/1
500 TABLET, FILM COATED ORAL 3 TIMES DAILY
Qty: 42 | Refills: 1 | Status: DISCONTINUED | COMMUNITY
Start: 2023-08-17 | End: 2024-05-21

## 2024-05-21 RX ORDER — TAMSULOSIN HYDROCHLORIDE 0.4 MG/1
0.4 CAPSULE ORAL
Qty: 90 | Refills: 1 | Status: ACTIVE | COMMUNITY
Start: 1900-01-01 | End: 1900-01-01

## 2024-05-21 RX ORDER — OXYCODONE AND ACETAMINOPHEN 5; 325 MG/1; MG/1
5-325 TABLET ORAL DAILY
Qty: 14 | Refills: 0 | Status: DISCONTINUED | COMMUNITY
Start: 2023-08-03 | End: 2024-05-21

## 2024-05-21 RX ORDER — OXYCODONE AND ACETAMINOPHEN 5; 325 MG/1; MG/1
5-325 TABLET ORAL
Qty: 28 | Refills: 0 | Status: DISCONTINUED | COMMUNITY
Start: 2023-08-17 | End: 2024-05-21

## 2024-05-21 NOTE — ASSESSMENT
[FreeTextEntry1] : Discussion of metformin today as Kash requests metformin script as he was previously prescribed it for 5 years and he feels it with helped with headaches, back pain and overall health. Discussed that his normal A1c and BMI does not warrant metformin. He is frustrated I will not provide him a script and states he will pay another doctor for a script. Visit today limited as he declines to discuss his health further after learning I will not provide metformin Rx.

## 2024-05-21 NOTE — HISTORY OF PRESENT ILLNESS
[de-identified] : Mr. PATY VIVAS is a 68-year-old male with history of BPH, HTN, prediabetes and hypothyroidism presenting today for follow up. Last visit over a year ago. He recently had a CCTA with Dr. Reyes showing reduced flow in the distal LAD.

## 2024-05-21 NOTE — PHYSICAL EXAM
[No Acute Distress] : no acute distress [Well-Appearing] : well-appearing [Normal Sclera/Conjunctiva] : normal sclera/conjunctiva [Normal Outer Ear/Nose] : the outer ears and nose were normal in appearance [No Respiratory Distress] : no respiratory distress  [No Accessory Muscle Use] : no accessory muscle use [Normal Rate] : normal rate  [No Rash] : no rash [Speech Grossly Normal] : speech grossly normal [Normal Mood] : the mood was normal

## 2024-05-22 LAB — TSH SERPL-ACNC: 2.06 UIU/ML

## 2024-06-04 NOTE — PHYSICAL EXAM
[Well Developed] : well developed [Normal S1, S2] : normal S1, S2 [No Murmur] : no murmur [Clear Lung Fields] : clear lung fields [Good Air Entry] : good air entry [Soft] : abdomen soft [Normal Gait] : normal gait [No Edema] : no edema [Moves all extremities] : moves all extremities [Alert and Oriented] : alert and oriented

## 2024-06-06 ENCOUNTER — APPOINTMENT (OUTPATIENT)
Dept: HEART AND VASCULAR | Facility: CLINIC | Age: 69
End: 2024-06-06
Payer: MEDICARE

## 2024-06-06 ENCOUNTER — APPOINTMENT (OUTPATIENT)
Dept: INTERNAL MEDICINE | Facility: CLINIC | Age: 69
End: 2024-06-06
Payer: MEDICARE

## 2024-06-06 VITALS
HEIGHT: 74 IN | SYSTOLIC BLOOD PRESSURE: 129 MMHG | WEIGHT: 163.38 LBS | HEART RATE: 92 BPM | OXYGEN SATURATION: 98 % | DIASTOLIC BLOOD PRESSURE: 78 MMHG | BODY MASS INDEX: 20.97 KG/M2

## 2024-06-06 VITALS
HEART RATE: 96 BPM | DIASTOLIC BLOOD PRESSURE: 72 MMHG | OXYGEN SATURATION: 98 % | HEIGHT: 74 IN | SYSTOLIC BLOOD PRESSURE: 112 MMHG | BODY MASS INDEX: 20.92 KG/M2 | WEIGHT: 163 LBS

## 2024-06-06 DIAGNOSIS — M54.16 RADICULOPATHY, LUMBAR REGION: ICD-10-CM

## 2024-06-06 DIAGNOSIS — Z12.83 ENCOUNTER FOR SCREENING FOR MALIGNANT NEOPLASM OF SKIN: ICD-10-CM

## 2024-06-06 DIAGNOSIS — Z12.11 ENCOUNTER FOR SCREENING FOR MALIGNANT NEOPLASM OF COLON: ICD-10-CM

## 2024-06-06 PROCEDURE — 99213 OFFICE O/P EST LOW 20 MIN: CPT

## 2024-06-06 PROCEDURE — 99214 OFFICE O/P EST MOD 30 MIN: CPT

## 2024-06-06 PROCEDURE — G2211 COMPLEX E/M VISIT ADD ON: CPT

## 2024-06-06 RX ORDER — AMLODIPINE BESYLATE AND BENAZEPRIL HYDROCHLORIDE 5; 10 MG/1; MG/1
5-10 CAPSULE ORAL
Qty: 90 | Refills: 1 | Status: ACTIVE | COMMUNITY
Start: 2024-06-06 | End: 1900-01-01

## 2024-06-06 RX ORDER — GABAPENTIN 300 MG/1
300 CAPSULE ORAL
Qty: 90 | Refills: 1 | Status: ACTIVE | COMMUNITY
Start: 2024-06-06 | End: 1900-01-01

## 2024-06-06 NOTE — HISTORY OF PRESENT ILLNESS
[FreeTextEntry8] : Today the patient is complaining of pain on the lower back that it radiates today patient is complaining of lower back pain radiating to the thighs and glutes.  Also he comes with forms to fill Face Mask

## 2024-06-07 LAB
25(OH)D3 SERPL-MCNC: 100 NG/ML
CHOLEST SERPL-MCNC: 211 MG/DL
HDLC SERPL-MCNC: 67 MG/DL
LDLC SERPL CALC-MCNC: 129 MG/DL
NONHDLC SERPL-MCNC: 144 MG/DL
PSA SERPL-MCNC: 3.29 NG/ML
TRIGL SERPL-MCNC: 83 MG/DL
TSH SERPL-ACNC: 1.61 UIU/ML

## 2024-06-14 NOTE — ASSESSMENT
[FreeTextEntry1] : IMPRESSION and PLAN:  # CAD - Has some dyspnea on exertion and chest discomfort - TTE showing normal LV size and function, no significant valvular disease - CCTA: Ca 985 with mild to moderate disease in prox RCA - CT FFR showed FFR 0.74 in distal LAD; 0.93 in LCx and 0.95 in distal RCA - Pt would like further noninvasive testing before cardiac cath. Will send for stress echocardiogram  (patient requesting communication of results via email [email or web address])  # Ascending aorta dilatation to 4.2cm - Follows with CT surgery Dr. Pagan - Planned for follow up CT and TTE in 1 year to monitor change in size - BP control  # Dyslipidemia - 7/2023:  Total 197, HDL 55 - Advise starting statins given severe coronary calcifications but patient prefers not to take statins at the moment - low fat diet - Will check repeat lipid panel  # HTN - low salt diet - Continue amlodipine - Home BP log  # Anxiety  - Patient wants to discuss anti-anxiety medications - Would refer to PMD  F/u after stress echo

## 2024-06-14 NOTE — REVIEW OF SYSTEMS
[Dyspnea on exertion] : dyspnea during exertion [Chest Discomfort] : chest discomfort [Fever] : no fever [Chills] : no chills [Sore Throat] : no sore throat [SOB] : no shortness of breath [Lower Ext Edema] : no extremity edema [Leg Claudication] : no intermittent leg claudication [Palpitations] : no palpitations [Orthopnea] : no orthopnea [PND] : no PND [Cough] : no cough [Abdominal Pain] : no abdominal pain

## 2024-06-14 NOTE — CARDIOLOGY SUMMARY
[de-identified] : ECG 2/2023: Sinus rhythm at 78bpm [de-identified] : TTE 2/27/24: Normal RV and LV size and function, Aortic valve sclerosis without stenosis [de-identified] : CT FFR: The results of the CT-FFR analysis are: LAD: 0.74 distal (2); 0.87 D2 (1) LCx: 0.93 (1) RCA: 0.98 proximal (1); 0.95 distal (1)  CCTA 4/19/24: IMPRESSION: 1.  The calcium score is severe at 985 Agatston units, which is at the 89 percentile, adjusted for age, gender and race. 2.  Mild to moderate stenosis of proximal RCA and D2. 3.  Remaining coronary segments are non-obstructive.  CT chest cancer screening: Ascending aorta 4.2 cm, severe coronary calcifications [de-identified] : 7/2023: , HDL 55, total 197  Cr 0.7

## 2024-06-14 NOTE — HISTORY OF PRESENT ILLNESS
[FreeTextEntry1] : 67 yo male patient with PMHx of HTN, DL, pre-DM, BPH, hypothyroidism, lumbar radiculopathy, presenting for f/u afte ccta. Reports that he has had a couple episodes of dizzy spells while getting out of bed fast. Otherwise in his usual state of health. Initially had a ccta due to having noticed some CASE recently. CCTA ambiguous of severity of coronary disease. No major blockages identified except for distal LAD. Unclear if significant. Distal LAD FFR (+)

## 2024-06-20 ENCOUNTER — APPOINTMENT (OUTPATIENT)
Dept: ORTHOPEDIC SURGERY | Facility: CLINIC | Age: 69
End: 2024-06-20

## 2024-07-11 ENCOUNTER — RESULT REVIEW (OUTPATIENT)
Age: 69
End: 2024-07-11

## 2024-07-11 ENCOUNTER — OUTPATIENT (OUTPATIENT)
Dept: OUTPATIENT SERVICES | Facility: HOSPITAL | Age: 69
LOS: 1 days | End: 2024-07-11
Payer: MEDICARE

## 2024-07-11 DIAGNOSIS — Z90.89 ACQUIRED ABSENCE OF OTHER ORGANS: Chronic | ICD-10-CM

## 2024-07-11 DIAGNOSIS — I20.89 OTHER FORMS OF ANGINA PECTORIS: ICD-10-CM

## 2024-07-11 DIAGNOSIS — S82.891A OTHER FRACTURE OF RIGHT LOWER LEG, INITIAL ENCOUNTER FOR CLOSED FRACTURE: Chronic | ICD-10-CM

## 2024-07-11 PROCEDURE — 93351 STRESS TTE COMPLETE: CPT | Mod: 26,52

## 2024-07-11 PROCEDURE — 93351 STRESS TTE COMPLETE: CPT

## 2024-07-17 NOTE — ED PROVIDER NOTE - NSICDXPASTSURGICALHX_GEN_ALL_CORE_FT
Cardiac Catheterization Appropriate Use    History of Heart Failure? [x]  Yes  []  No     Newly Diagnosed? []  Yes  [x]  No     NYHA class    [] I  [] II  [x] III  [] IV            Stress Test Peformed * Important for AUC criteria []  Yes  [x]  No         If yes, specify test performed and must include risk of ischemia      StressTest Type   Test Result   Risk of Ischemia   [] Standard Exercise ST             (without imaging)  [] Stress Echo  [] ST Nuclear   [] ST with CMR    [] Positive                   []Negative  [] Indeterminate  [] Unavailable [] High   [] Intermediate  [] Low  [] Unavailable        Indication(s) for Cath Lab Visit  (select all that apply)    [] ACS  [] New onset angina<=2 months  [] Worsening Angina  [] Resuscitated Cardiac Arrest   [] Stable Known CAD  [x] Suspected CAD  [x] Valvular Disease  [] Pericardial Disease  [] Pre-Operative Evaluation  []  Syncope []Post-Cardiac Transplant  []Cardiac Arrhythmias   [] Cardiomyopathy  [] LV dysfunction  [] Evaluation for Exercise Clearance  [] Other         Chest Pain Symptoms     [] Typical Angina      []   Atypical                                Angina      [] Non-anginal Chest Pain [x]Asymptomatic      Cardiovascular Instability  [] Yes  [x]  No         If yes, specify instability type  (select all that apply)    [] Persistent Ischemic                     Symptoms(chest pain)   [] Hemodynamic Instability(not        Cardiogenic shock)  [] Cardiogenic Shock  [] Refractory Cardiogenic Shock   [] Acute Heart Failure Symptoms  [] Ventricular Arrhythmia        Evaluation for Surgery [x]  Cardiac Surgery        []  Non-Cardiac Surgery      Functional Capacity []<4 METS  [x]>= 4 METS without symptoms  []  >=4 mets with symptoms    []  Unknown     Surgical Risk [x]  Low     []  Intermediate    []High Risk Vascular  []  High Risk Non-Vascular     Only at [facility]:  Study  ACC Bleeding Risk Score: 0.9 %   [x]  Low: (? 2%)   []  Moderate  (> 2% and ? 6.5%)  []  High (> 6.5%)         CathPCI Risk ([email or web address])              PAST SURGICAL HISTORY:  Ankle fracture, right 5 yrs ago tx @[facility] with plates and sharron    S/P tonsillectomy

## 2024-07-18 ENCOUNTER — APPOINTMENT (OUTPATIENT)
Dept: DERMATOLOGY | Facility: CLINIC | Age: 69
End: 2024-07-18
Payer: MEDICARE

## 2024-07-18 VITALS — HEIGHT: 71 IN | WEIGHT: 160 LBS | BODY MASS INDEX: 22.4 KG/M2

## 2024-07-18 DIAGNOSIS — D18.01 HEMANGIOMA OF SKIN AND SUBCUTANEOUS TISSUE: ICD-10-CM

## 2024-07-18 DIAGNOSIS — L21.9 SEBORRHEIC DERMATITIS, UNSPECIFIED: ICD-10-CM

## 2024-07-18 DIAGNOSIS — D22.9 MELANOCYTIC NEVI, UNSPECIFIED: ICD-10-CM

## 2024-07-18 DIAGNOSIS — D48.9 NEOPLASM OF UNCERTAIN BEHAVIOR, UNSPECIFIED: ICD-10-CM

## 2024-07-18 DIAGNOSIS — Z12.83 ENCOUNTER FOR SCREENING FOR MALIGNANT NEOPLASM OF SKIN: ICD-10-CM

## 2024-07-18 DIAGNOSIS — L82.1 OTHER SEBORRHEIC KERATOSIS: ICD-10-CM

## 2024-07-18 DIAGNOSIS — L82.0 INFLAMED SEBORRHEIC KERATOSIS: ICD-10-CM

## 2024-07-18 DIAGNOSIS — L81.4 OTHER MELANIN HYPERPIGMENTATION: ICD-10-CM

## 2024-07-18 DIAGNOSIS — Z80.8 FAMILY HISTORY OF MALIGNANT NEOPLASM OF OTHER ORGANS OR SYSTEMS: ICD-10-CM

## 2024-07-18 PROCEDURE — 99204 OFFICE O/P NEW MOD 45 MIN: CPT | Mod: 25

## 2024-07-18 PROCEDURE — 11104 PUNCH BX SKIN SINGLE LESION: CPT

## 2024-07-18 RX ORDER — KETOCONAZOLE 20.5 MG/ML
2 SHAMPOO, SUSPENSION TOPICAL
Qty: 1 | Refills: 11 | Status: ACTIVE | COMMUNITY
Start: 2024-07-18 | End: 1900-01-01

## 2024-07-25 LAB — DERMATOLOGY BIOPSY: NORMAL

## 2024-08-01 ENCOUNTER — APPOINTMENT (OUTPATIENT)
Dept: DERMATOLOGY | Facility: CLINIC | Age: 69
End: 2024-08-01
Payer: MEDICARE

## 2024-08-01 DIAGNOSIS — L82.1 OTHER SEBORRHEIC KERATOSIS: ICD-10-CM

## 2024-08-01 PROCEDURE — 99212 OFFICE O/P EST SF 10 MIN: CPT

## 2024-08-15 ENCOUNTER — NON-APPOINTMENT (OUTPATIENT)
Age: 69
End: 2024-08-15

## 2024-08-27 ENCOUNTER — NON-APPOINTMENT (OUTPATIENT)
Age: 69
End: 2024-08-27

## 2024-08-28 ENCOUNTER — APPOINTMENT (OUTPATIENT)
Dept: GASTROENTEROLOGY | Facility: CLINIC | Age: 69
End: 2024-08-28
Payer: MEDICARE

## 2024-08-28 VITALS
DIASTOLIC BLOOD PRESSURE: 58 MMHG | SYSTOLIC BLOOD PRESSURE: 100 MMHG | TEMPERATURE: 97.7 F | RESPIRATION RATE: 15 BRPM | HEART RATE: 91 BPM | OXYGEN SATURATION: 98 % | HEIGHT: 71 IN | BODY MASS INDEX: 22.96 KG/M2 | WEIGHT: 164 LBS

## 2024-08-28 DIAGNOSIS — Z12.11 ENCOUNTER FOR SCREENING FOR MALIGNANT NEOPLASM OF COLON: ICD-10-CM

## 2024-08-28 PROCEDURE — 99204 OFFICE O/P NEW MOD 45 MIN: CPT

## 2024-08-28 RX ORDER — SODIUM SULFATE, POTASSIUM SULFATE AND MAGNESIUM SULFATE 1.6; 3.13; 17.5 G/177ML; G/177ML; G/177ML
17.5-3.13-1.6 SOLUTION ORAL
Qty: 1 | Refills: 0 | Status: ACTIVE | COMMUNITY
Start: 2024-08-28 | End: 1900-01-01

## 2024-08-28 NOTE — ASSESSMENT
[FreeTextEntry1] : 68M with a h/o HTN, hypothyroidism, lumbar radiculopathy who presents for screening colonoscopy consultation.  #CRC screening - schedule colonoscopy with suprep at [facility] per patient preference  - risks/benefits/alternatives and need for post-procedural escort discussed - recent labs reviewed: CBC without anemia, CMP with normal liver enzymes, TSH wnl   Blue Davey MD Gastroenterology

## 2024-08-28 NOTE — HISTORY OF PRESENT ILLNESS
[FreeTextEntry1] : 68M with a h/o HTN, hypothyroidism, lumbar radiculopathy who presents for screening colonoscopy consultation. Denies GI symptoms, including heartburn, n/v, abd pain, diarrhea, constipation, melena, or hematochezia.   Possibly had a colonoscopy 40 years ago for unclear reasons. Never had EGD.   Family Hx: no GI malignancy, CD, UC   Social Hx: former smoker (unclear length, patient thinks medical system will hold it against him if he says for how long), no EtOH, no recreational drugs, works as a writer

## 2024-09-03 ENCOUNTER — RX RENEWAL (OUTPATIENT)
Age: 69
End: 2024-09-03

## 2024-09-09 ENCOUNTER — APPOINTMENT (OUTPATIENT)
Dept: INTERNAL MEDICINE | Facility: CLINIC | Age: 69
End: 2024-09-09

## 2024-09-18 ENCOUNTER — APPOINTMENT (OUTPATIENT)
Dept: INTERNAL MEDICINE | Facility: CLINIC | Age: 69
End: 2024-09-18

## 2024-09-25 ENCOUNTER — APPOINTMENT (OUTPATIENT)
Dept: INTERNAL MEDICINE | Facility: CLINIC | Age: 69
End: 2024-09-25
Payer: MEDICARE

## 2024-09-25 VITALS
HEART RATE: 89 BPM | DIASTOLIC BLOOD PRESSURE: 86 MMHG | TEMPERATURE: 96.6 F | OXYGEN SATURATION: 98 % | SYSTOLIC BLOOD PRESSURE: 133 MMHG | HEIGHT: 71 IN | BODY MASS INDEX: 23.24 KG/M2 | WEIGHT: 166 LBS

## 2024-09-25 DIAGNOSIS — I10 ESSENTIAL (PRIMARY) HYPERTENSION: ICD-10-CM

## 2024-09-25 DIAGNOSIS — G47.00 INSOMNIA, UNSPECIFIED: ICD-10-CM

## 2024-09-25 DIAGNOSIS — N52.9 MALE ERECTILE DYSFUNCTION, UNSPECIFIED: ICD-10-CM

## 2024-09-25 PROCEDURE — G0439: CPT

## 2024-09-25 RX ORDER — TADALAFIL 10 MG/1
10 TABLET, FILM COATED ORAL
Qty: 10 | Refills: 0 | Status: ACTIVE | COMMUNITY
Start: 2024-09-25 | End: 1900-01-01

## 2024-09-25 RX ORDER — AMLODIPINE BESYLATE 5 MG/1
5 TABLET ORAL DAILY
Qty: 90 | Refills: 0 | Status: ACTIVE | COMMUNITY
Start: 2024-09-25

## 2024-09-25 RX ORDER — TRAZODONE HYDROCHLORIDE 50 MG/1
50 TABLET ORAL
Qty: 30 | Refills: 1 | Status: ACTIVE | COMMUNITY
Start: 2024-09-25 | End: 1900-01-01

## 2024-09-25 NOTE — PHYSICAL EXAM
[No Acute Distress] : no acute distress [Normal Sclera/Conjunctiva] : normal sclera/conjunctiva [No Respiratory Distress] : no respiratory distress  [No Accessory Muscle Use] : no accessory muscle use [Clear to Auscultation] : lungs were clear to auscultation bilaterally [Normal Rate] : normal rate  [Regular Rhythm] : with a regular rhythm [Normal S1, S2] : normal S1 and S2 [No Murmur] : no murmur heard [No Edema] : there was no peripheral edema [Soft] : abdomen soft [Non Tender] : non-tender [Non-distended] : non-distended [Coordination Grossly Intact] : coordination grossly intact [No Focal Deficits] : no focal deficits [Normal Affect] : the affect was normal [Alert and Oriented x3] : oriented to person, place, and time

## 2024-09-26 NOTE — HISTORY OF PRESENT ILLNESS
[FreeTextEntry1] : annual visit [de-identified] : 69 y/o M with PMHx of BPH, HTN, Hypothyroidism, ascending aortic aneurysm (4.2 cm), pre-DM, CAD, HLD presenting to establish care. Today, patient has complaints of insomnia, states he has a hard time falling asleep, has tried melatonin with some relief but is concerned about the side-effects and would prefer a prescription medication. Patient has tried Ambien in past (years ago, does not remember dose) and reports it helped him. He reports sleeping 4-6 hours most nights, he wakes up and feels well rested most days, does not think he snores and does not use electronics before bed. Patient is also requesting Cialis today, he reports he has a new girlfriend and would like to have Cialis on hand. For HTN, patient states he was prescribed combination Amlodipine-Benazepril but states it made him dizzy and he self-discontinued, is currently on Amlodipine 5mg. Home BP readings have been 120/80s.  Social hx: social alcohol use, denies drug use, states he is currently not a smoker and prefers not to share if he smoked previously  Fam Hx: mom: passed away from stroke at age 80, dad passed away at age 70 due to complications of HF

## 2024-09-26 NOTE — END OF VISIT
[] : Resident [FreeTextEntry3] : HTN, BPH, hypothyroidism, ascending aortic aneurysm, CAD, HLD here for AWV Insomnia -- chronically takes Ambien. Has poor social support so might have some mood symptoms. Sleep hygiene OK. Will try trazodone erectile dysfunction -- wants cialis on hand. Has taken it in the past. Can give prn cialis but he declines to answer if his issues are still present in mornings or when is alone, so unclear etiology. HTN -- fine on amlodipine 5. Might drop a bit with cialis and tamsulosin-- pt informed about risk of orthostasis BPH -- c/w tamsulosin pre-DM -- can try off metformin HLD -- refusing statin. We can give ezetimibe to try to bring LDL below 100 HCM -- refusing vaccines, will get colonoscopy soon

## 2024-09-26 NOTE — ASSESSMENT
[FreeTextEntry1] : 67 y/o M with PMHx of BPH, HTN, Hypothyroidism, ascending aortic aneurysm (4.2 cm), pre-DM, CAD, HLD presenting to establish care.  #Insomnia  Patient presenting with complaints of insomnia  - sent prescription for Trazodone 50mg - reinforced good sleep hygiene habits   #Erectile Dysfunction  Patient presenting with complaints of ED - sent Cialis 10mg to use PRN before sexual activity   #HTN  Patient with hx of HTN. Currently on Amlodipine 5mg. BP in clinic today 133/86 and home BPs 120/80s - c/w Amlodipine 5mg  - continue to monitor BP at home   #BPH Patient with hx of BPH. Currently on Tamsulosin 0.4mg  - c/w Tamsulosin  #Hypothyroidism  Patient with hx of hypothyroidism. Currently on Levothyroxine 100mcg. TSH: 1.61 in 6/2024 - c/w Levothyroxine  #Ascending Aortic Aneurysm  #CAD CT chest (12/23) : Ascending aorta 4.2 cm, severe coronary calcifications  - following cards and CT Surg, continue to follow with them - currently not meeting criteria for repair  #HLD Patient with hx of HLD. ASCVD: 21%. Patient currently refusing initiating statin, would prefer to focus on lifestyle modification. Offered alternative med Zetia but patient will like time to look into Zetia before he makes a decision  - revisit initiating Zetia at next visit   #Pre-DM Patient reports hx of pre-DM and diagnosis reported in notes authored by prior PCP (Dr. Boo). A1C: 5.2% in 3/2024. Currently on Metformin - informed patient he can d/c Metformin as A1C is very well controlled but patient reports he would prefer continuing Metformin  #HCM  -Colonoscopy: scheduled for 11/11/2024 -Shingles: reports receiving vaccine in 2019 but unsure  #Refussal of vaccine administration Patient offered TDAP and pneumonia vaccine during this visit but patient refusing vaccines and states he "does not believe in vaccines"  RTC in 3 months for follow-up  Discussed with Dr. Ness

## 2024-09-26 NOTE — REASON FOR VISIT
[Annual Wellness Visit] : an annual wellness visit [FreeTextEntry1] : pt is here for annual physical.

## 2024-09-26 NOTE — HEALTH RISK ASSESSMENT
[Good] : ~his/her~  mood as  good [Yes] : Yes [Monthly or less (1 pt)] : Monthly or less (1 point) [Never (0 pts)] : Never (0 points) [Patient unable to screen] : Patient unable to screen [NO] : No [Alone] : lives alone [Employed] : employed [Feels Safe at Home] : Feels safe at home [Fully functional (bathing, dressing, toileting, transferring, walking, feeding)] : Fully functional (bathing, dressing, toileting, transferring, walking, feeding) [Fully functional (using the telephone, shopping, preparing meals, housekeeping, doing laundry, using] : Fully functional and needs no help or supervision to perform IADLs (using the telephone, shopping, preparing meals, housekeeping, doing laundry, using transportation, managing medications and managing finances) [de-identified] : socially  [de-identified] : patient does not want to disclose tobacco hx, states he is not a current smoker  [Reports changes in hearing] : Reports no changes in hearing [Reports changes in vision] : Reports no changes in vision [ColonoscopyComments] : schedules for 11/11/2024 [de-identified] : [employer] work

## 2024-09-26 NOTE — REVIEW OF SYSTEMS
[Back Pain] : back pain [Fever] : no fever [Chills] : no chills [Chest Pain] : no chest pain [Lower Ext Edema] : no lower extremity edema [Shortness Of Breath] : no shortness of breath [Cough] : no cough [Dyspnea on Exertion] : not dyspnea on exertion [Abdominal Pain] : no abdominal pain [Nausea] : no nausea [Constipation] : no constipation [Diarrhea] : no diarrhea [Vomiting] : no vomiting [Dysuria] : no dysuria [Hematuria] : no hematuria [Headache] : no headache [Dizziness] : no dizziness

## 2024-09-26 NOTE — HEALTH RISK ASSESSMENT
[Good] : ~his/her~  mood as  good [Yes] : Yes [Monthly or less (1 pt)] : Monthly or less (1 point) [Never (0 pts)] : Never (0 points) [Patient unable to screen] : Patient unable to screen [NO] : No [Alone] : lives alone [Employed] : employed [Feels Safe at Home] : Feels safe at home [Fully functional (bathing, dressing, toileting, transferring, walking, feeding)] : Fully functional (bathing, dressing, toileting, transferring, walking, feeding) [Fully functional (using the telephone, shopping, preparing meals, housekeeping, doing laundry, using] : Fully functional and needs no help or supervision to perform IADLs (using the telephone, shopping, preparing meals, housekeeping, doing laundry, using transportation, managing medications and managing finances) [de-identified] : socially  [de-identified] : patient does not want to disclose tobacco hx, states he is not a current smoker  [Reports changes in hearing] : Reports no changes in hearing [Reports changes in vision] : Reports no changes in vision [ColonoscopyComments] : schedules for 11/11/2024 [de-identified] : [employer] work

## 2024-09-26 NOTE — HISTORY OF PRESENT ILLNESS
[FreeTextEntry1] : annual visit [de-identified] : 69 y/o M with PMHx of BPH, HTN, Hypothyroidism, ascending aortic aneurysm (4.2 cm), pre-DM, CAD, HLD presenting to establish care. Today, patient has complaints of insomnia, states he has a hard time falling asleep, has tried melatonin with some relief but is concerned about the side-effects and would prefer a prescription medication. Patient has tried Ambien in past (years ago, does not remember dose) and reports it helped him. He reports sleeping 4-6 hours most nights, he wakes up and feels well rested most days, does not think he snores and does not use electronics before bed. Patient is also requesting Cialis today, he reports he has a new girlfriend and would like to have Cialis on hand. For HTN, patient states he was prescribed combination Amlodipine-Benazepril but states it made him dizzy and he self-discontinued, is currently on Amlodipine 5mg. Home BP readings have been 120/80s.  Social hx: social alcohol use, denies drug use, states he is currently not a smoker and prefers not to share if he smoked previously  Fam Hx: mom: passed away from stroke at age 80, dad passed away at age 70 due to complications of HF

## 2024-11-04 ENCOUNTER — NON-APPOINTMENT (OUTPATIENT)
Age: 69
End: 2024-11-04

## 2024-11-11 ENCOUNTER — APPOINTMENT (OUTPATIENT)
Dept: GASTROENTEROLOGY | Facility: HOSPITAL | Age: 69
End: 2024-11-11

## 2024-11-11 ENCOUNTER — RESULT REVIEW (OUTPATIENT)
Age: 69
End: 2024-11-11

## 2024-11-11 ENCOUNTER — OUTPATIENT (OUTPATIENT)
Dept: OUTPATIENT SERVICES | Facility: HOSPITAL | Age: 69
LOS: 1 days | Discharge: HOME | End: 2024-11-11
Payer: MEDICARE

## 2024-11-11 VITALS
OXYGEN SATURATION: 97 % | HEART RATE: 78 BPM | TEMPERATURE: 98 F | RESPIRATION RATE: 18 BRPM | SYSTOLIC BLOOD PRESSURE: 132 MMHG | HEIGHT: 72 IN | WEIGHT: 154.98 LBS | DIASTOLIC BLOOD PRESSURE: 86 MMHG

## 2024-11-11 DIAGNOSIS — Z90.89 ACQUIRED ABSENCE OF OTHER ORGANS: Chronic | ICD-10-CM

## 2024-11-11 DIAGNOSIS — S82.891A OTHER FRACTURE OF RIGHT LOWER LEG, INITIAL ENCOUNTER FOR CLOSED FRACTURE: Chronic | ICD-10-CM

## 2024-11-11 PROCEDURE — 88305 TISSUE EXAM BY PATHOLOGIST: CPT | Mod: 26

## 2024-11-11 PROCEDURE — 88341 IMHCHEM/IMCYTCHM EA ADD ANTB: CPT | Mod: 26

## 2024-11-11 PROCEDURE — 88360 TUMOR IMMUNOHISTOCHEM/MANUAL: CPT

## 2024-11-11 PROCEDURE — 45380 COLONOSCOPY AND BIOPSY: CPT

## 2024-11-11 PROCEDURE — 88342 IMHCHEM/IMCYTCHM 1ST ANTB: CPT | Mod: 26

## 2024-11-11 PROCEDURE — 88342 IMHCHEM/IMCYTCHM 1ST ANTB: CPT

## 2024-11-11 PROCEDURE — 45380 COLONOSCOPY AND BIOPSY: CPT | Mod: PT

## 2024-11-11 PROCEDURE — 88341 IMHCHEM/IMCYTCHM EA ADD ANTB: CPT

## 2024-11-11 PROCEDURE — 88305 TISSUE EXAM BY PATHOLOGIST: CPT

## 2024-11-11 NOTE — PRE-ANESTHESIA EVALUATION ADULT - NSANTHPMHFT_GEN_ALL_CORE
Cardiac: Positive for HTN. Denies HLD, MI/Angina/Heart Failure, Arrhythmia, Murmur/Valvular Disorder. >4 METS  Pulmonary: Denies Asthma, COPD, MARYCRUZ  Renal: Positive for nephrolithiasis previously, BPH. Denies kidney dysfunction  Hepatic: Denies liver dysfunction  Gastrointestinal: Denies GERD/IBS  Endocrine: Positive for hypothyroidism. Denies DM.   Neurologic: Denies stroke/seizure disorder  Hematologic: Denies anemia, blood clotting disorder, blood thinning medication.    PSH: Tonsillectomy, ankle surgery, right knee surgery x 3, spinal surgery.

## 2024-11-11 NOTE — PRE-ANESTHESIA EVALUATION ADULT - NSDENTALSD_ENT_ALL_CORE
PROCEDURE:CHEST 2 VIEWS

 

COMPARISON:[facility], CR, XRAY CHEST 2 VWS, 01/14/2019, 10:13 

AM.

 

INDICATIONS:J44.1 COPD, COUGH X 1 WEEK

 

FINDINGS:

LUNGS/PLEURA:Slightly increased density in the left lung base/lingula question 

atelectasis.  Right lung is clear.  No CHF or effusions are seen.

VASCULATURE:Normal.  Unremarkable pulmonary vasculature.

CARDIAC:Normal.  No cardiac silhouette abnormality or cardiomegaly.  

Atheromatous calcifications in the aorta.

MEDIASTINUM:Normal.  No visible mass or adenopathy. 

BONES:Normal.  No fracture or visible bony lesion. 

OTHER:Negative.  

 

CONCLUSION:Increased density in the left lung base and lingula, question 

atelectasis, pneumonia cannot be completely excluded.  Right lung is clear.  No 

CHF or effusions are seen.

 

 

 

Dictated by: Spencer Hayden MD on 12/02/2019 at 09:51 AM     

Electronically Signed By: Spencer Hayden MD on 12/02/2019 at 09:53 AM
Missing several rear molars, premolars, teeth. Poor dentition with several chipped teeth, ground/worn teeth, cavities./missing teeth

## 2024-11-20 ENCOUNTER — NON-APPOINTMENT (OUTPATIENT)
Age: 69
End: 2024-11-20

## 2024-11-22 ENCOUNTER — RX RENEWAL (OUTPATIENT)
Age: 69
End: 2024-11-22

## 2024-11-26 ENCOUNTER — RX RENEWAL (OUTPATIENT)
Age: 69
End: 2024-11-26

## 2024-12-24 ENCOUNTER — RX RENEWAL (OUTPATIENT)
Age: 69
End: 2024-12-24

## 2025-01-06 ENCOUNTER — APPOINTMENT (OUTPATIENT)
Dept: INTERNAL MEDICINE | Facility: CLINIC | Age: 70
End: 2025-01-06

## 2025-01-08 ENCOUNTER — APPOINTMENT (OUTPATIENT)
Dept: CARDIOTHORACIC SURGERY | Facility: CLINIC | Age: 70
End: 2025-01-08

## 2025-01-08 ENCOUNTER — APPOINTMENT (OUTPATIENT)
Dept: INTERNAL MEDICINE | Facility: CLINIC | Age: 70
End: 2025-01-08
Payer: MEDICARE

## 2025-01-08 VITALS
DIASTOLIC BLOOD PRESSURE: 86 MMHG | OXYGEN SATURATION: 98 % | TEMPERATURE: 98 F | BODY MASS INDEX: 23.24 KG/M2 | SYSTOLIC BLOOD PRESSURE: 117 MMHG | HEART RATE: 86 BPM | WEIGHT: 166 LBS | HEIGHT: 71 IN | RESPIRATION RATE: 15 BRPM

## 2025-01-08 DIAGNOSIS — D3A.8 OTHER BENIGN NEUROENDOCRINE TUMORS: ICD-10-CM

## 2025-01-08 DIAGNOSIS — R19.7 DIARRHEA, UNSPECIFIED: ICD-10-CM

## 2025-01-08 PROCEDURE — 99214 OFFICE O/P EST MOD 30 MIN: CPT | Mod: GC

## 2025-01-08 PROCEDURE — G2211 COMPLEX E/M VISIT ADD ON: CPT

## 2025-01-16 ENCOUNTER — APPOINTMENT (OUTPATIENT)
Dept: ORTHOPEDIC SURGERY | Facility: CLINIC | Age: 70
End: 2025-01-16
Payer: MEDICARE

## 2025-01-16 ENCOUNTER — RESULT REVIEW (OUTPATIENT)
Age: 70
End: 2025-01-16

## 2025-01-16 ENCOUNTER — OUTPATIENT (OUTPATIENT)
Dept: OUTPATIENT SERVICES | Facility: HOSPITAL | Age: 70
LOS: 1 days | End: 2025-01-16
Payer: MEDICARE

## 2025-01-16 DIAGNOSIS — M54.9 DORSALGIA, UNSPECIFIED: ICD-10-CM

## 2025-01-16 DIAGNOSIS — S82.891A OTHER FRACTURE OF RIGHT LOWER LEG, INITIAL ENCOUNTER FOR CLOSED FRACTURE: Chronic | ICD-10-CM

## 2025-01-16 DIAGNOSIS — Z90.89 ACQUIRED ABSENCE OF OTHER ORGANS: Chronic | ICD-10-CM

## 2025-01-16 PROCEDURE — 72110 X-RAY EXAM L-2 SPINE 4/>VWS: CPT

## 2025-01-16 PROCEDURE — 72110 X-RAY EXAM L-2 SPINE 4/>VWS: CPT | Mod: 26

## 2025-01-16 PROCEDURE — 99213 OFFICE O/P EST LOW 20 MIN: CPT

## 2025-01-16 RX ORDER — METHOCARBAMOL 500 MG/1
500 TABLET, FILM COATED ORAL 3 TIMES DAILY
Qty: 90 | Refills: 1 | Status: ACTIVE | COMMUNITY
Start: 2025-01-16 | End: 1900-01-01

## 2025-01-18 LAB — GI PCR PANEL: NOT DETECTED

## 2025-01-21 ENCOUNTER — NON-APPOINTMENT (OUTPATIENT)
Age: 70
End: 2025-01-21

## 2025-01-29 ENCOUNTER — APPOINTMENT (OUTPATIENT)
Dept: INTERNAL MEDICINE | Facility: CLINIC | Age: 70
End: 2025-01-29
Payer: MEDICARE

## 2025-01-29 DIAGNOSIS — G47.00 INSOMNIA, UNSPECIFIED: ICD-10-CM

## 2025-01-29 DIAGNOSIS — R73.03 PREDIABETES.: ICD-10-CM

## 2025-01-29 PROCEDURE — 99214 OFFICE O/P EST MOD 30 MIN: CPT | Mod: GC

## 2025-01-29 PROCEDURE — G2211 COMPLEX E/M VISIT ADD ON: CPT

## 2025-01-29 RX ORDER — METFORMIN HYDROCHLORIDE 500 MG/1
500 TABLET, COATED ORAL
Qty: 120 | Refills: 3 | Status: ACTIVE | COMMUNITY
Start: 2025-01-29 | End: 1900-01-01

## 2025-01-30 ENCOUNTER — NON-APPOINTMENT (OUTPATIENT)
Age: 70
End: 2025-01-30

## 2025-01-30 ENCOUNTER — APPOINTMENT (OUTPATIENT)
Dept: COLORECTAL SURGERY | Facility: CLINIC | Age: 70
End: 2025-01-30
Payer: MEDICARE

## 2025-01-30 VITALS
WEIGHT: 155 LBS | BODY MASS INDEX: 21.7 KG/M2 | DIASTOLIC BLOOD PRESSURE: 84 MMHG | HEART RATE: 84 BPM | SYSTOLIC BLOOD PRESSURE: 152 MMHG | TEMPERATURE: 98.6 F | HEIGHT: 71 IN | OXYGEN SATURATION: 96 %

## 2025-01-30 PROCEDURE — 99204 OFFICE O/P NEW MOD 45 MIN: CPT | Mod: 57

## 2025-02-03 ENCOUNTER — NON-APPOINTMENT (OUTPATIENT)
Age: 70
End: 2025-02-03

## 2025-02-03 ENCOUNTER — APPOINTMENT (OUTPATIENT)
Dept: HEMATOLOGY ONCOLOGY | Facility: CLINIC | Age: 70
End: 2025-02-03
Payer: MEDICARE

## 2025-02-03 VITALS
HEIGHT: 71 IN | SYSTOLIC BLOOD PRESSURE: 137 MMHG | BODY MASS INDEX: 23.8 KG/M2 | HEART RATE: 96 BPM | OXYGEN SATURATION: 96 % | WEIGHT: 170 LBS | RESPIRATION RATE: 18 BRPM | DIASTOLIC BLOOD PRESSURE: 94 MMHG

## 2025-02-03 DIAGNOSIS — D3A.8 OTHER BENIGN NEUROENDOCRINE TUMORS: ICD-10-CM

## 2025-02-03 PROCEDURE — 99214 OFFICE O/P EST MOD 30 MIN: CPT

## 2025-02-03 PROCEDURE — G2211 COMPLEX E/M VISIT ADD ON: CPT

## 2025-02-03 RX ORDER — METFORMIN HYDROCHLORIDE 500 MG/1
500 TABLET, COATED ORAL DAILY
Refills: 0 | Status: ACTIVE | COMMUNITY
Start: 2025-02-03

## 2025-02-05 LAB
ALBUMIN SERPL ELPH-MCNC: 3.8 G/DL
ALP BLD-CCNC: 63 U/L
ALT SERPL-CCNC: 17 U/L
ANION GAP SERPL CALC-SCNC: 2 MMOL/L
APTT BLD: 29.6 SEC
AST SERPL-CCNC: 24 U/L
BILIRUB SERPL-MCNC: 0.8 MG/DL
BUN SERPL-MCNC: 13 MG/DL
CALCIUM SERPL-MCNC: 9.6 MG/DL
CHLORIDE SERPL-SCNC: 114 MMOL/L
CO2 SERPL-SCNC: 24 MMOL/L
CREAT SERPL-MCNC: 0.9 MG/DL
EGFR: 93 ML/MIN/1.73M2
FERRITIN SERPL-MCNC: 54 NG/ML
GLUCOSE SERPL-MCNC: 120 MG/DL
HBV CORE IGG+IGM SER QL: NONREACTIVE
HBV SURFACE AB SER QL: NONREACTIVE
HBV SURFACE AG SER QL: NONREACTIVE
HCT VFR BLD CALC: 47 %
HCV AB SER QL: NONREACTIVE
HCV S/CO RATIO: 0.05 S/CO
HGB BLD-MCNC: 16.2 G/DL
HIV1+2 AB SPEC QL IA.RAPID: NONREACTIVE
INR PPP: 0.89
IRON SATN MFR SERPL: 23 %
IRON SERPL-MCNC: 78 UG/DL
LYMPHOCYTES # BLD AUTO: 1.2 K/UL
LYMPHOCYTES NFR BLD AUTO: 21.4 %
MAGNESIUM SERPL-MCNC: 2 MG/DL
MAN DIFF?: NO
MCHC RBC-ENTMCNC: 32 PG
MCHC RBC-ENTMCNC: 34.5 G/DL
MCV RBC AUTO: 92.7 FL
NEUTROPHILS # BLD AUTO: 3.5 K/UL
NEUTROPHILS NFR BLD AUTO: 66 %
PHOSPHATE SERPL-MCNC: 2.3 MG/DL
PLATELET # BLD AUTO: 180 K/UL
POTASSIUM SERPL-SCNC: 4.2 MMOL/L
PROT SERPL-MCNC: 6.7 G/DL
PT BLD: 10.3 SEC
RBC # BLD: 5.07 M/UL
RBC # FLD: 14.5 %
SODIUM SERPL-SCNC: 140 MMOL/L
TIBC SERPL-MCNC: 346 UG/DL
UIBC SERPL-MCNC: 268 UG/DL
WBC # FLD AUTO: 5.4 K/UL

## 2025-02-07 LAB — CGA SERPL-MCNC: 68.6 NG/ML

## 2025-02-10 ENCOUNTER — APPOINTMENT (OUTPATIENT)
Dept: INTERNAL MEDICINE | Facility: CLINIC | Age: 70
End: 2025-02-10
Payer: MEDICARE

## 2025-02-10 DIAGNOSIS — L21.9 SEBORRHEIC DERMATITIS, UNSPECIFIED: ICD-10-CM

## 2025-02-10 DIAGNOSIS — N40.0 BENIGN PROSTATIC HYPERPLASIA WITHOUT LOWER URINARY TRACT SYMPMS: ICD-10-CM

## 2025-02-10 DIAGNOSIS — E03.9 HYPOTHYROIDISM, UNSPECIFIED: ICD-10-CM

## 2025-02-10 PROCEDURE — 99214 OFFICE O/P EST MOD 30 MIN: CPT

## 2025-02-10 PROCEDURE — G2211 COMPLEX E/M VISIT ADD ON: CPT

## 2025-02-12 ENCOUNTER — NON-APPOINTMENT (OUTPATIENT)
Age: 70
End: 2025-02-12

## 2025-02-12 ENCOUNTER — RESULT REVIEW (OUTPATIENT)
Age: 70
End: 2025-02-12

## 2025-02-12 ENCOUNTER — APPOINTMENT (OUTPATIENT)
Dept: CARDIOTHORACIC SURGERY | Facility: CLINIC | Age: 70
End: 2025-02-12
Payer: MEDICARE

## 2025-02-12 ENCOUNTER — OUTPATIENT (OUTPATIENT)
Dept: OUTPATIENT SERVICES | Facility: HOSPITAL | Age: 70
LOS: 1 days | End: 2025-02-12
Payer: MEDICARE

## 2025-02-12 VITALS
HEART RATE: 85 BPM | DIASTOLIC BLOOD PRESSURE: 81 MMHG | BODY MASS INDEX: 23.52 KG/M2 | OXYGEN SATURATION: 98 % | HEIGHT: 71 IN | WEIGHT: 168 LBS | SYSTOLIC BLOOD PRESSURE: 144 MMHG

## 2025-02-12 DIAGNOSIS — I71.21 ANEURYSM OF THE ASCENDING AORTA, WITHOUT RUPTURE: ICD-10-CM

## 2025-02-12 DIAGNOSIS — S82.891A OTHER FRACTURE OF RIGHT LOWER LEG, INITIAL ENCOUNTER FOR CLOSED FRACTURE: Chronic | ICD-10-CM

## 2025-02-12 DIAGNOSIS — Z90.89 ACQUIRED ABSENCE OF OTHER ORGANS: Chronic | ICD-10-CM

## 2025-02-12 PROCEDURE — 99213 OFFICE O/P EST LOW 20 MIN: CPT

## 2025-02-12 PROCEDURE — 93306 TTE W/DOPPLER COMPLETE: CPT

## 2025-02-12 PROCEDURE — 93306 TTE W/DOPPLER COMPLETE: CPT | Mod: 26

## 2025-02-13 ENCOUNTER — NON-APPOINTMENT (OUTPATIENT)
Age: 70
End: 2025-02-13

## 2025-02-13 VITALS — BODY MASS INDEX: 23.52 KG/M2 | WEIGHT: 168 LBS | HEIGHT: 71 IN

## 2025-02-13 DIAGNOSIS — Z87.891 PERSONAL HISTORY OF NICOTINE DEPENDENCE: ICD-10-CM

## 2025-02-13 NOTE — HISTORY OF PRESENT ILLNESS
[de-identified] : Follow up 06/13/2023: Mr. Palma is here for follow up. He continues to report that his pain down his left lower extremity is severe. He did take the steroids with some improvement; however, he is still having significant pain. He reports that he is having difficulty firing his left calf recently since his last visit and feels numbness of his left lower extremity that is continuous.\par \par Initial visit 05/24/2023: Mr. Palma is here for spinal consultation. He reports that he has had ongoing chronic back pain However, starting on Saturday of this past weekend, he developed severe pain from his back going down his left lower extremity to his toes. His left lower extremity has been spasms since then. His left foot has been numb. He was seen by a physician who put him on Percocet. However that does not significantly relieve his pain. He is unable to ambulate significantly due to pain in his leg that starts as soon as he stands up to walk. He is completely disabled by his pain syndrome. no

## 2025-02-17 NOTE — ED PROVIDER NOTE - CPE EDP EYES NORM
Group Topic: Feeling Awareness/Expression   Group Date: 2/17/2025  Start Time: 10:00 AM  End Time: 11:00 AM  Facilitators: SANDY Kirkpatrick   Department: [facility]    Number of Participants: 5   Group Focus: check in  Treatment Modality: Cognitive Behavioral Therapy  Interventions utilized were exploration and group exercise  Purpose: coping skills and feelings    This was a video IOP group on a HIPAA compliant platform. All patients were provided with informed consent.     Date: 2/17/2025, Time: 10a-11a, Number of Patients: 5, User Name: [email or web address],  Number of Staff: 1  Group topic(s): Check in. Group began with introductions and review of group etiquette and norms. Patients then checked in using the jordyn, bud and rand prompt.    SANDY Rodriguez-S      Name: Ni Loera YOB: 1996   MR: [id number]      Ni introduced herself to the group. She shared having mixed feelings about going to see her parents over the weekend- she was happy to break her isolation and connect with people, but frustrated with unsolicited advice and opinions.   normal...

## 2025-02-24 ENCOUNTER — OUTPATIENT (OUTPATIENT)
Dept: OUTPATIENT SERVICES | Facility: HOSPITAL | Age: 70
LOS: 1 days | End: 2025-02-24
Payer: MEDICARE

## 2025-02-24 ENCOUNTER — APPOINTMENT (OUTPATIENT)
Dept: CT IMAGING | Facility: HOSPITAL | Age: 70
End: 2025-02-24

## 2025-02-24 DIAGNOSIS — Z90.89 ACQUIRED ABSENCE OF OTHER ORGANS: Chronic | ICD-10-CM

## 2025-02-24 DIAGNOSIS — S82.891A OTHER FRACTURE OF RIGHT LOWER LEG, INITIAL ENCOUNTER FOR CLOSED FRACTURE: Chronic | ICD-10-CM

## 2025-02-24 PROCEDURE — 71271 CT THORAX LUNG CANCER SCR C-: CPT | Mod: 26

## 2025-02-24 PROCEDURE — 71271 CT THORAX LUNG CANCER SCR C-: CPT

## 2025-02-25 ENCOUNTER — APPOINTMENT (OUTPATIENT)
Dept: COLORECTAL SURGERY | Facility: CLINIC | Age: 70
End: 2025-02-25
Payer: MEDICARE

## 2025-02-25 VITALS
BODY MASS INDEX: 23.52 KG/M2 | SYSTOLIC BLOOD PRESSURE: 149 MMHG | OXYGEN SATURATION: 96 % | HEIGHT: 71 IN | DIASTOLIC BLOOD PRESSURE: 91 MMHG | HEART RATE: 90 BPM | RESPIRATION RATE: 18 BRPM | TEMPERATURE: 98.1 F | WEIGHT: 168 LBS

## 2025-02-25 PROCEDURE — 99213 OFFICE O/P EST LOW 20 MIN: CPT | Mod: 57

## 2025-03-03 ENCOUNTER — APPOINTMENT (OUTPATIENT)
Dept: INTERNAL MEDICINE | Facility: CLINIC | Age: 70
End: 2025-03-03
Payer: MEDICARE

## 2025-03-03 ENCOUNTER — RESULT CHARGE (OUTPATIENT)
Age: 70
End: 2025-03-03

## 2025-03-03 VITALS
SYSTOLIC BLOOD PRESSURE: 112 MMHG | HEART RATE: 77 BPM | BODY MASS INDEX: 23.38 KG/M2 | HEIGHT: 71 IN | OXYGEN SATURATION: 98 % | TEMPERATURE: 97 F | WEIGHT: 167 LBS | DIASTOLIC BLOOD PRESSURE: 71 MMHG

## 2025-03-03 DIAGNOSIS — I71.21 ANEURYSM OF THE ASCENDING AORTA, WITHOUT RUPTURE: ICD-10-CM

## 2025-03-03 DIAGNOSIS — N40.0 BENIGN PROSTATIC HYPERPLASIA WITHOUT LOWER URINARY TRACT SYMPMS: ICD-10-CM

## 2025-03-03 PROCEDURE — 99215 OFFICE O/P EST HI 40 MIN: CPT | Mod: 25

## 2025-03-03 PROCEDURE — 93000 ELECTROCARDIOGRAM COMPLETE: CPT

## 2025-03-04 ENCOUNTER — NON-APPOINTMENT (OUTPATIENT)
Age: 70
End: 2025-03-04

## 2025-03-04 LAB
ALBUMIN SERPL ELPH-MCNC: 4.3 G/DL
ALP BLD-CCNC: 88 U/L
ALT SERPL-CCNC: 16 U/L
ANION GAP SERPL CALC-SCNC: 15 MMOL/L
AST SERPL-CCNC: 21 U/L
BILIRUB SERPL-MCNC: 0.5 MG/DL
BUN SERPL-MCNC: 13 MG/DL
CALCIUM SERPL-MCNC: 9.4 MG/DL
CHLORIDE SERPL-SCNC: 105 MMOL/L
CO2 SERPL-SCNC: 21 MMOL/L
CREAT SERPL-MCNC: 0.88 MG/DL
EGFRCR SERPLBLD CKD-EPI 2021: 94 ML/MIN/1.73M2
GLUCOSE SERPL-MCNC: 100 MG/DL
HCT VFR BLD CALC: 50 %
HGB BLD-MCNC: 16.2 G/DL
MCHC RBC-ENTMCNC: 31.2 PG
MCHC RBC-ENTMCNC: 32.4 G/DL
MCV RBC AUTO: 96.3 FL
PLATELET # BLD AUTO: 157 K/UL
POTASSIUM SERPL-SCNC: 4.6 MMOL/L
PROT SERPL-MCNC: 7 G/DL
RBC # BLD: 5.19 M/UL
RBC # FLD: 13.2 %
SODIUM SERPL-SCNC: 142 MMOL/L
WBC # FLD AUTO: 4.13 K/UL

## 2025-03-06 ENCOUNTER — OUTPATIENT (OUTPATIENT)
Dept: OUTPATIENT SERVICES | Facility: HOSPITAL | Age: 70
LOS: 1 days | End: 2025-03-06
Payer: MEDICARE

## 2025-03-06 ENCOUNTER — APPOINTMENT (OUTPATIENT)
Dept: NUCLEAR MEDICINE | Facility: HOSPITAL | Age: 70
End: 2025-03-06
Payer: MEDICARE

## 2025-03-06 ENCOUNTER — APPOINTMENT (OUTPATIENT)
Dept: HEART AND VASCULAR | Facility: CLINIC | Age: 70
End: 2025-03-06
Payer: MEDICARE

## 2025-03-06 VITALS
WEIGHT: 163 LBS | TEMPERATURE: 98 F | DIASTOLIC BLOOD PRESSURE: 75 MMHG | OXYGEN SATURATION: 96 % | BODY MASS INDEX: 22.82 KG/M2 | SYSTOLIC BLOOD PRESSURE: 115 MMHG | HEIGHT: 71 IN | HEART RATE: 72 BPM

## 2025-03-06 DIAGNOSIS — Z90.89 ACQUIRED ABSENCE OF OTHER ORGANS: Chronic | ICD-10-CM

## 2025-03-06 DIAGNOSIS — S82.891A OTHER FRACTURE OF RIGHT LOWER LEG, INITIAL ENCOUNTER FOR CLOSED FRACTURE: Chronic | ICD-10-CM

## 2025-03-06 PROCEDURE — 99213 OFFICE O/P EST LOW 20 MIN: CPT | Mod: 25

## 2025-03-06 PROCEDURE — 78815 PET IMAGE W/CT SKULL-THIGH: CPT

## 2025-03-06 PROCEDURE — 78815 PET IMAGE W/CT SKULL-THIGH: CPT | Mod: 26,PI

## 2025-03-06 PROCEDURE — 82962 GLUCOSE BLOOD TEST: CPT

## 2025-03-06 PROCEDURE — 93000 ELECTROCARDIOGRAM COMPLETE: CPT | Mod: NC

## 2025-03-06 PROCEDURE — A9587: CPT

## 2025-03-10 ENCOUNTER — NON-APPOINTMENT (OUTPATIENT)
Age: 70
End: 2025-03-10

## 2025-03-10 ENCOUNTER — APPOINTMENT (OUTPATIENT)
Dept: HEMATOLOGY ONCOLOGY | Facility: CLINIC | Age: 70
End: 2025-03-10
Payer: MEDICARE

## 2025-03-10 VITALS
SYSTOLIC BLOOD PRESSURE: 156 MMHG | HEART RATE: 75 BPM | BODY MASS INDEX: 23.1 KG/M2 | RESPIRATION RATE: 18 BRPM | OXYGEN SATURATION: 98 % | WEIGHT: 165 LBS | HEIGHT: 71 IN | TEMPERATURE: 97.6 F | DIASTOLIC BLOOD PRESSURE: 96 MMHG

## 2025-03-10 DIAGNOSIS — D3A.8 OTHER BENIGN NEUROENDOCRINE TUMORS: ICD-10-CM

## 2025-03-10 PROCEDURE — 99215 OFFICE O/P EST HI 40 MIN: CPT

## 2025-03-10 RX ORDER — CIPROFLOXACIN HYDROCHLORIDE 500 MG/1
500 TABLET, FILM COATED ORAL
Qty: 2 | Refills: 0 | Status: ACTIVE | COMMUNITY
Start: 2025-03-10 | End: 1900-01-01

## 2025-03-10 RX ORDER — METRONIDAZOLE 500 MG/1
500 TABLET ORAL
Qty: 6 | Refills: 0 | Status: ACTIVE | COMMUNITY
Start: 2025-03-10 | End: 1900-01-01

## 2025-03-10 RX ORDER — POLYETHYLENE GLYCOL 3350 17 G/17G
17 POWDER, FOR SOLUTION ORAL
Qty: 238 | Refills: 1 | Status: ACTIVE | COMMUNITY
Start: 2025-03-10 | End: 1900-01-01

## 2025-03-12 RX ORDER — SODIUM SULFATE, POTASSIUM SULFATE AND MAGNESIUM SULFATE 1.6; 3.13; 17.5 G/177ML; G/177ML; G/177ML
17.5-3.13-1.6 SOLUTION ORAL
Qty: 1 | Refills: 0 | Status: ACTIVE | COMMUNITY
Start: 2025-03-12 | End: 1900-01-01

## 2025-03-13 NOTE — PATIENT PROFILE ADULT - FALL HARM RISK - HARM RISK INTERVENTIONS

## 2025-03-14 ENCOUNTER — APPOINTMENT (OUTPATIENT)
Dept: COLORECTAL SURGERY | Facility: HOSPITAL | Age: 70
End: 2025-03-14

## 2025-03-14 ENCOUNTER — TRANSCRIPTION ENCOUNTER (OUTPATIENT)
Age: 70
End: 2025-03-14

## 2025-03-14 ENCOUNTER — INPATIENT (INPATIENT)
Facility: HOSPITAL | Age: 70
LOS: 2 days | Discharge: HOME | End: 2025-03-17
Attending: STUDENT IN AN ORGANIZED HEALTH CARE EDUCATION/TRAINING PROGRAM | Admitting: STUDENT IN AN ORGANIZED HEALTH CARE EDUCATION/TRAINING PROGRAM
Payer: MEDICARE

## 2025-03-14 VITALS
RESPIRATION RATE: 16 BRPM | SYSTOLIC BLOOD PRESSURE: 114 MMHG | TEMPERATURE: 98 F | DIASTOLIC BLOOD PRESSURE: 78 MMHG | OXYGEN SATURATION: 97 % | HEIGHT: 71 IN | WEIGHT: 169.32 LBS | HEART RATE: 87 BPM

## 2025-03-14 DIAGNOSIS — Z98.890 OTHER SPECIFIED POSTPROCEDURAL STATES: Chronic | ICD-10-CM

## 2025-03-14 DIAGNOSIS — S82.891A OTHER FRACTURE OF RIGHT LOWER LEG, INITIAL ENCOUNTER FOR CLOSED FRACTURE: Chronic | ICD-10-CM

## 2025-03-14 DIAGNOSIS — Z90.89 ACQUIRED ABSENCE OF OTHER ORGANS: Chronic | ICD-10-CM

## 2025-03-14 LAB
BLD GP AB SCN SERPL QL: NEGATIVE — SIGNIFICANT CHANGE UP
RH IG SCN BLD-IMP: NEGATIVE — SIGNIFICANT CHANGE UP

## 2025-03-14 PROCEDURE — 88360 TUMOR IMMUNOHISTOCHEM/MANUAL: CPT | Mod: 26,59

## 2025-03-14 PROCEDURE — 88342 IMHCHEM/IMCYTCHM 1ST ANTB: CPT | Mod: 26,59

## 2025-03-14 PROCEDURE — 88341 IMHCHEM/IMCYTCHM EA ADD ANTB: CPT | Mod: 26

## 2025-03-14 PROCEDURE — 88309 TISSUE EXAM BY PATHOLOGIST: CPT | Mod: 26

## 2025-03-14 PROCEDURE — 44205 LAP COLECTOMY PART W/ILEUM: CPT | Mod: GC

## 2025-03-14 PROCEDURE — 44205 LAP COLECTOMY PART W/ILEUM: CPT | Mod: AS,GC

## 2025-03-14 DEVICE — XI STAPLER SUREFORM RELOAD 60 BLUE: Type: IMPLANTABLE DEVICE | Status: FUNCTIONAL

## 2025-03-14 RX ORDER — ONDANSETRON HCL/PF 4 MG/2 ML
4 VIAL (ML) INJECTION EVERY 6 HOURS
Refills: 0 | Status: DISCONTINUED | OUTPATIENT
Start: 2025-03-14 | End: 2025-03-14

## 2025-03-14 RX ORDER — TAMSULOSIN HYDROCHLORIDE 0.4 MG/1
0.4 CAPSULE ORAL AT BEDTIME
Refills: 0 | Status: DISCONTINUED | OUTPATIENT
Start: 2025-03-14 | End: 2025-03-17

## 2025-03-14 RX ORDER — HEPARIN SODIUM 1000 [USP'U]/ML
5000 INJECTION INTRAVENOUS; SUBCUTANEOUS EVERY 8 HOURS
Refills: 0 | Status: DISCONTINUED | OUTPATIENT
Start: 2025-03-14 | End: 2025-03-17

## 2025-03-14 RX ORDER — TRAZODONE HCL 100 MG
50 TABLET ORAL AT BEDTIME
Refills: 0 | Status: DISCONTINUED | OUTPATIENT
Start: 2025-03-14 | End: 2025-03-17

## 2025-03-14 RX ORDER — ONDANSETRON HCL/PF 4 MG/2 ML
4 VIAL (ML) INJECTION EVERY 6 HOURS
Refills: 0 | Status: DISCONTINUED | OUTPATIENT
Start: 2025-03-14 | End: 2025-03-17

## 2025-03-14 RX ORDER — KETOROLAC TROMETHAMINE 30 MG/ML
15 INJECTION, SOLUTION INTRAMUSCULAR; INTRAVENOUS EVERY 8 HOURS
Refills: 0 | Status: DISCONTINUED | OUTPATIENT
Start: 2025-03-14 | End: 2025-03-17

## 2025-03-14 RX ORDER — DEXTROSE 50 % IN WATER 50 %
25 SYRINGE (ML) INTRAVENOUS ONCE
Refills: 0 | Status: DISCONTINUED | OUTPATIENT
Start: 2025-03-14 | End: 2025-03-17

## 2025-03-14 RX ORDER — AMLODIPINE BESYLATE 10 MG/1
5 TABLET ORAL DAILY
Refills: 0 | Status: DISCONTINUED | OUTPATIENT
Start: 2025-03-14 | End: 2025-03-17

## 2025-03-14 RX ORDER — MELATONIN 5 MG
5 TABLET ORAL AT BEDTIME
Refills: 0 | Status: DISCONTINUED | OUTPATIENT
Start: 2025-03-14 | End: 2025-03-17

## 2025-03-14 RX ORDER — SODIUM CHLORIDE 9 G/1000ML
1000 INJECTION, SOLUTION INTRAVENOUS
Refills: 0 | Status: DISCONTINUED | OUTPATIENT
Start: 2025-03-14 | End: 2025-03-17

## 2025-03-14 RX ORDER — ACETAMINOPHEN 500 MG/5ML
1000 LIQUID (ML) ORAL EVERY 6 HOURS
Refills: 0 | Status: DISCONTINUED | OUTPATIENT
Start: 2025-03-14 | End: 2025-03-14

## 2025-03-14 RX ORDER — INSULIN LISPRO 100 U/ML
INJECTION, SOLUTION INTRAVENOUS; SUBCUTANEOUS
Refills: 0 | Status: DISCONTINUED | OUTPATIENT
Start: 2025-03-14 | End: 2025-03-17

## 2025-03-14 RX ORDER — HYDROMORPHONE/SOD CHLOR,ISO/PF 2 MG/10 ML
30 SYRINGE (ML) INJECTION
Refills: 0 | Status: DISCONTINUED | OUTPATIENT
Start: 2025-03-14 | End: 2025-03-15

## 2025-03-14 RX ORDER — LEVOTHYROXINE SODIUM 300 MCG
88 TABLET ORAL DAILY
Refills: 0 | Status: DISCONTINUED | OUTPATIENT
Start: 2025-03-14 | End: 2025-03-17

## 2025-03-14 RX ORDER — METHOCARBAMOL 500 MG/1
500 TABLET, FILM COATED ORAL EVERY 8 HOURS
Refills: 0 | Status: DISCONTINUED | OUTPATIENT
Start: 2025-03-14 | End: 2025-03-17

## 2025-03-14 RX ORDER — BUPIVACAINE 13.3 MG/ML
20 INJECTION, SUSPENSION, LIPOSOMAL INFILTRATION ONCE
Refills: 0 | Status: DISCONTINUED | OUTPATIENT
Start: 2025-03-14 | End: 2025-03-17

## 2025-03-14 RX ORDER — HEPARIN SODIUM 1000 [USP'U]/ML
5000 INJECTION INTRAVENOUS; SUBCUTANEOUS ONCE
Refills: 0 | Status: COMPLETED | OUTPATIENT
Start: 2025-03-14 | End: 2025-03-14

## 2025-03-14 RX ORDER — GLUCAGON 3 MG/1
1 POWDER NASAL ONCE
Refills: 0 | Status: DISCONTINUED | OUTPATIENT
Start: 2025-03-14 | End: 2025-03-17

## 2025-03-14 RX ORDER — TRAZODONE HCL 100 MG
0 TABLET ORAL
Refills: 0 | DISCHARGE

## 2025-03-14 RX ORDER — AMLODIPINE BESYLATE 10 MG/1
1 TABLET ORAL
Refills: 0 | DISCHARGE

## 2025-03-14 RX ORDER — SODIUM CHLORIDE 9 G/1000ML
1000 INJECTION, SOLUTION INTRAVENOUS
Refills: 0 | Status: DISCONTINUED | OUTPATIENT
Start: 2025-03-14 | End: 2025-03-15

## 2025-03-14 RX ORDER — ACETAMINOPHEN 500 MG/5ML
1000 LIQUID (ML) ORAL EVERY 6 HOURS
Refills: 0 | Status: DISCONTINUED | OUTPATIENT
Start: 2025-03-14 | End: 2025-03-15

## 2025-03-14 RX ORDER — METFORMIN HYDROCHLORIDE 850 MG/1
1 TABLET ORAL
Refills: 0 | DISCHARGE

## 2025-03-14 RX ORDER — ACETAMINOPHEN 500 MG/5ML
1000 LIQUID (ML) ORAL ONCE
Refills: 0 | Status: COMPLETED | OUTPATIENT
Start: 2025-03-14 | End: 2025-03-14

## 2025-03-14 RX ORDER — HEPARIN SODIUM 1000 [USP'U]/ML
5000 INJECTION INTRAVENOUS; SUBCUTANEOUS EVERY 8 HOURS
Refills: 0 | Status: DISCONTINUED | OUTPATIENT
Start: 2025-03-14 | End: 2025-03-14

## 2025-03-14 RX ORDER — NALOXONE HYDROCHLORIDE 0.4 MG/ML
0.1 INJECTION, SOLUTION INTRAMUSCULAR; INTRAVENOUS; SUBCUTANEOUS
Refills: 0 | Status: DISCONTINUED | OUTPATIENT
Start: 2025-03-14 | End: 2025-03-17

## 2025-03-14 RX ORDER — DEXTROSE 50 % IN WATER 50 %
15 SYRINGE (ML) INTRAVENOUS ONCE
Refills: 0 | Status: DISCONTINUED | OUTPATIENT
Start: 2025-03-14 | End: 2025-03-17

## 2025-03-14 RX ORDER — DEXTROSE 50 % IN WATER 50 %
12.5 SYRINGE (ML) INTRAVENOUS ONCE
Refills: 0 | Status: DISCONTINUED | OUTPATIENT
Start: 2025-03-14 | End: 2025-03-17

## 2025-03-14 RX ADMIN — HEPARIN SODIUM 5000 UNIT(S): 1000 INJECTION INTRAVENOUS; SUBCUTANEOUS at 22:00

## 2025-03-14 RX ADMIN — METHOCARBAMOL 500 MILLIGRAM(S): 500 TABLET, FILM COATED ORAL at 16:07

## 2025-03-14 RX ADMIN — Medication 1000 MILLIGRAM(S): at 08:39

## 2025-03-14 RX ADMIN — Medication 30 MILLILITER(S): at 14:53

## 2025-03-14 RX ADMIN — Medication 5 MILLIGRAM(S): at 21:43

## 2025-03-14 RX ADMIN — INSULIN LISPRO 1: 100 INJECTION, SOLUTION INTRAVENOUS; SUBCUTANEOUS at 16:23

## 2025-03-14 RX ADMIN — METHOCARBAMOL 500 MILLIGRAM(S): 500 TABLET, FILM COATED ORAL at 21:43

## 2025-03-14 RX ADMIN — HEPARIN SODIUM 5000 UNIT(S): 1000 INJECTION INTRAVENOUS; SUBCUTANEOUS at 08:39

## 2025-03-14 RX ADMIN — Medication 50 MILLIGRAM(S): at 21:44

## 2025-03-14 RX ADMIN — KETOROLAC TROMETHAMINE 15 MILLIGRAM(S): 30 INJECTION, SOLUTION INTRAMUSCULAR; INTRAVENOUS at 16:08

## 2025-03-14 RX ADMIN — KETOROLAC TROMETHAMINE 15 MILLIGRAM(S): 30 INJECTION, SOLUTION INTRAMUSCULAR; INTRAVENOUS at 21:43

## 2025-03-14 RX ADMIN — INSULIN LISPRO 2: 100 INJECTION, SOLUTION INTRAVENOUS; SUBCUTANEOUS at 21:43

## 2025-03-14 RX ADMIN — TAMSULOSIN HYDROCHLORIDE 0.4 MILLIGRAM(S): 0.4 CAPSULE ORAL at 21:43

## 2025-03-14 NOTE — H&P ADULT - ASSESSMENT
69yoM PMH BPH, HTN, hypothyroidism, AAA (4.2cm), prediabetes, CAD, HLD, and grade I NET of the ileum, here for robotic right hemicolectomy.      Proceed to OR for planned procedure  Pain control post op   CLD  Anti-emetics

## 2025-03-14 NOTE — H&P ADULT - NSICDXPASTSURGICALHX_GEN_ALL_CORE_FT
PAST SURGICAL HISTORY:  Ankle fracture, right 5 yrs ago tx @[facility] with plates and rodes    H/O discectomy     S/P tonsillectomy

## 2025-03-14 NOTE — PROGRESS NOTE ADULT - SUBJECTIVE AND OBJECTIVE BOX
General Surgery Post-Op Note    Procedure: R hemicolectomy     Diagnosis/Indication: grade I NET of the ileum    Surgeon: Dr. Chappell    S: Pt has no complaints. Reports feeling well, having an appetite. Tolerating sips of clears. Denies headache, dizziness, CP, SOB, CASE, calf tenderness, n/v. Pain controlled with medication.    O:  T(C): 36.3 (03-14-25 @ 16:50), Max: 36.3 (03-14-25 @ 13:46)  T(F): 97.3 (03-14-25 @ 16:50), Max: 97.3 (03-14-25 @ 13:46)  HR: 88 (03-14-25 @ 16:50) (85 - 101)  BP: 119/75 (03-14-25 @ 16:50) (105/56 - 133/62)  RR: 17 (03-14-25 @ 16:50) (16 - 17)  SpO2: 94% (03-14-25 @ 16:50) (92% - 99%)  Wt(kg): --            Gen: NAD, resting comfortably in bed  C/V: NSR  Pulm: Nonlabored breathing, no respiratory distress  Abd: soft, ND, appropriate incisional tenderness to palpation. Tender in R hemiabdomen.           Incisions: closed with dermabond, C/D/I, no erythema or suppuration  Extrem: WWP, no calf edema, SCDs in place      A/P: 69yMale s/p R hemicolectomy for grade 1 NET. Doing well postoperatively.  CLD/IVF   Pain/nausea control PRN   Home meds as appropriate   ISS  Plata  SQH/SCDs/OOBA/IS   AM labs

## 2025-03-14 NOTE — H&P ADULT - NSHPPHYSICALEXAM_GEN_ALL_CORE
Gen: A&Ox3, NAD  Head: NC/AT  Neck: supple, trachea midline, no masses  Lungs: non labored breathing, no accessory muscle use  Heart: s1s2, RRR  Abd: soft, nontender, nondistended  Rectal: deferred   Ext: moves all, no edema  Psych: normal mood and affect

## 2025-03-14 NOTE — H&P ADULT - HISTORY OF PRESENT ILLNESS
69yoM PMH BPH, HTN, hypothyroidism, AAA (4.2cm), prediabetes, CAD, HLD, and grade I NET of the ileum. He presents for robotic right hemicolectomy.

## 2025-03-14 NOTE — PRE-ANESTHESIA EVALUATION ADULT - NSANTHOSAYNRD_GEN_A_CORE
No. MARYCRUZ screening performed.  STOP BANG Legend: 0-2 = LOW Risk; 3-4 = INTERMEDIATE Risk; 5-8 = HIGH Risk
independent

## 2025-03-14 NOTE — PRE-ANESTHESIA EVALUATION ADULT - NSDENTALSD_ENT_ALL_CORE
Patient starting to feel her contractions more. Patient SROMd at 1445 for clear fluids. Patient agreeable to being rechecked now after SROM.    Visit Vitals  /72   Pulse 80   Temp 98.1 °F (36.7 °C) (Oral)   Resp 18   Ht 5' 6\" (1.676 m)   Wt 93 kg   LMP 2023 (Exact Date)   SpO2 100%   BMI 33.09 kg/m²     FHT: 125 /moderate variability / + accels / - decels  [place]: q 2-3 mins   SVE: /70/-2    35 year old  female at 39w6d a/f IOL 2/2 TOLAC.   Cat I tracing. IOL with pitocin per protocol, started at 1106. GBS negative. SROM at 1445 for clear fluids. Continue pit per protocol, currently at 10mU/min. Epidural prn.    Dr. Montiel aware.    Miley Nieves MD  Obstetrics & Gynecology PGY-1       appears normal and intact

## 2025-03-15 ENCOUNTER — TRANSCRIPTION ENCOUNTER (OUTPATIENT)
Age: 70
End: 2025-03-15

## 2025-03-15 LAB
A1C WITH ESTIMATED AVERAGE GLUCOSE RESULT: 5.2 % — SIGNIFICANT CHANGE UP (ref 4–5.6)
ALBUMIN SERPL ELPH-MCNC: 3.5 G/DL — SIGNIFICANT CHANGE UP (ref 3.3–5)
ALP SERPL-CCNC: 67 U/L — SIGNIFICANT CHANGE UP (ref 40–120)
ALT FLD-CCNC: 15 U/L — SIGNIFICANT CHANGE UP (ref 10–45)
ANION GAP SERPL CALC-SCNC: 10 MMOL/L — SIGNIFICANT CHANGE UP (ref 5–17)
AST SERPL-CCNC: 30 U/L — SIGNIFICANT CHANGE UP (ref 10–40)
BASOPHILS NFR BLD AUTO: 0.2 % — SIGNIFICANT CHANGE UP (ref 0–2)
BILIRUB SERPL-MCNC: 0.4 MG/DL — SIGNIFICANT CHANGE UP (ref 0.2–1.2)
BUN SERPL-MCNC: 9 MG/DL — SIGNIFICANT CHANGE UP (ref 7–23)
CALCIUM SERPL-MCNC: 8.2 MG/DL — LOW (ref 8.4–10.5)
CHLORIDE SERPL-SCNC: 101 MMOL/L — SIGNIFICANT CHANGE UP (ref 96–108)
CO2 SERPL-SCNC: 23 MMOL/L — SIGNIFICANT CHANGE UP (ref 22–31)
CREAT SERPL-MCNC: 0.71 MG/DL — SIGNIFICANT CHANGE UP (ref 0.5–1.3)
EGFR: 100 ML/MIN/1.73M2 — SIGNIFICANT CHANGE UP
EOSINOPHIL # BLD AUTO: 0.07 K/UL — SIGNIFICANT CHANGE UP (ref 0–0.5)
EOSINOPHIL NFR BLD AUTO: 0.6 % — SIGNIFICANT CHANGE UP (ref 0–6)
ESTIMATED AVERAGE GLUCOSE: 103 MG/DL — SIGNIFICANT CHANGE UP (ref 68–114)
GLUCOSE SERPL-MCNC: 123 MG/DL — HIGH (ref 70–99)
HCT VFR BLD CALC: 38.8 % — LOW (ref 39–50)
HGB BLD-MCNC: 13.3 G/DL — SIGNIFICANT CHANGE UP (ref 13–17)
IMM GRANULOCYTES NFR BLD AUTO: 0.4 % — SIGNIFICANT CHANGE UP (ref 0–0.9)
LYMPHOCYTES # BLD AUTO: 1.44 K/UL — SIGNIFICANT CHANGE UP (ref 1–3.3)
LYMPHOCYTES # BLD AUTO: 12.7 % — LOW (ref 13–44)
MAGNESIUM SERPL-MCNC: 1.9 MG/DL — SIGNIFICANT CHANGE UP (ref 1.6–2.6)
MCHC RBC-ENTMCNC: 32 PG — SIGNIFICANT CHANGE UP (ref 27–34)
MONOCYTES # BLD AUTO: 0.74 K/UL — SIGNIFICANT CHANGE UP (ref 0–0.9)
MONOCYTES NFR BLD AUTO: 6.5 % — SIGNIFICANT CHANGE UP (ref 2–14)
NEUTROPHILS # BLD AUTO: 8.99 K/UL — HIGH (ref 1.8–7.4)
NEUTROPHILS NFR BLD AUTO: 79.6 % — HIGH (ref 43–77)
NRBC BLD AUTO-RTO: 0 /100 WBCS — SIGNIFICANT CHANGE UP (ref 0–0)
PHOSPHATE SERPL-MCNC: 2.9 MG/DL — SIGNIFICANT CHANGE UP (ref 2.5–4.5)
PLATELET # BLD AUTO: 173 K/UL — SIGNIFICANT CHANGE UP (ref 150–400)
POTASSIUM SERPL-MCNC: 3.7 MMOL/L — SIGNIFICANT CHANGE UP (ref 3.5–5.3)
POTASSIUM SERPL-SCNC: 3.7 MMOL/L — SIGNIFICANT CHANGE UP (ref 3.5–5.3)
PROT SERPL-MCNC: 5.8 G/DL — LOW (ref 6–8.3)
RBC # BLD: 4.16 M/UL — LOW (ref 4.2–5.8)
RBC # FLD: 12.9 % — SIGNIFICANT CHANGE UP (ref 10.3–14.5)
SODIUM SERPL-SCNC: 134 MMOL/L — LOW (ref 135–145)
WBC # BLD: 11.3 K/UL — HIGH (ref 3.8–10.5)
WBC # FLD AUTO: 11.3 K/UL — HIGH (ref 3.8–10.5)

## 2025-03-15 RX ORDER — HYDROMORPHONE/SOD CHLOR,ISO/PF 2 MG/10 ML
0.25 SYRINGE (ML) INJECTION EVERY 4 HOURS
Refills: 0 | Status: DISCONTINUED | OUTPATIENT
Start: 2025-03-15 | End: 2025-03-16

## 2025-03-15 RX ADMIN — KETOROLAC TROMETHAMINE 15 MILLIGRAM(S): 30 INJECTION, SOLUTION INTRAMUSCULAR; INTRAVENOUS at 05:38

## 2025-03-15 RX ADMIN — METHOCARBAMOL 500 MILLIGRAM(S): 500 TABLET, FILM COATED ORAL at 13:35

## 2025-03-15 RX ADMIN — Medication 5 MILLIGRAM(S): at 21:54

## 2025-03-15 RX ADMIN — Medication 88 MICROGRAM(S): at 05:38

## 2025-03-15 RX ADMIN — HEPARIN SODIUM 5000 UNIT(S): 1000 INJECTION INTRAVENOUS; SUBCUTANEOUS at 21:55

## 2025-03-15 RX ADMIN — Medication 40 MILLIEQUIVALENT(S): at 09:28

## 2025-03-15 RX ADMIN — METHOCARBAMOL 500 MILLIGRAM(S): 500 TABLET, FILM COATED ORAL at 21:55

## 2025-03-15 RX ADMIN — TAMSULOSIN HYDROCHLORIDE 0.4 MILLIGRAM(S): 0.4 CAPSULE ORAL at 21:54

## 2025-03-15 RX ADMIN — Medication 50 MILLIGRAM(S): at 21:55

## 2025-03-15 RX ADMIN — AMLODIPINE BESYLATE 5 MILLIGRAM(S): 10 TABLET ORAL at 05:38

## 2025-03-15 RX ADMIN — METHOCARBAMOL 500 MILLIGRAM(S): 500 TABLET, FILM COATED ORAL at 05:38

## 2025-03-15 RX ADMIN — HEPARIN SODIUM 5000 UNIT(S): 1000 INJECTION INTRAVENOUS; SUBCUTANEOUS at 05:38

## 2025-03-15 RX ADMIN — KETOROLAC TROMETHAMINE 15 MILLIGRAM(S): 30 INJECTION, SOLUTION INTRAMUSCULAR; INTRAVENOUS at 13:31

## 2025-03-15 RX ADMIN — KETOROLAC TROMETHAMINE 15 MILLIGRAM(S): 30 INJECTION, SOLUTION INTRAMUSCULAR; INTRAVENOUS at 21:55

## 2025-03-15 NOTE — DISCHARGE NOTE PROVIDER - CARE PROVIDER_API CALL
Chadd Chappell  Colon/Rectal Surgery  [facility]  [place] [address]  Phone: [phone]  Fax: [phone]  Established Patient  Follow Up Time:

## 2025-03-15 NOTE — DISCHARGE NOTE PROVIDER - INSTRUCTIONS
Please continue a LOW-FIBER DIET. Listed below are some foods you may eat and those you should avoid.   --Allowed foods: White bread without nuts and seeds, White rice, plain white pasta, and crackers, Refined hot cereals, such as Cream of Wheat, or cold cereals with less than 1 gram of fiber per serving, Pancakes or waffles made from white refined flour, Most canned or well-cooked vegetables and fruits without skins or seeds, Fruit and vegetable juice with little or no pulp, fruit-flavored drinks, and flavored murrell, Tender meat, poultry, fish, eggs and tofu, Milk and foods made from milk — such as yogurt, pudding, ice cream, cheeses and sour cream — if tolerated, Butter, margarine, oils and salad dressings without seeds  --Foods to avoid: Whole-wheat or whole-grain breads, cereals and pasta, Brown or wild rice and other whole grains, such as oats, kasha, barley and quinoa, Dried fruits and prune juice, Raw fruit, including those with seeds, skin or membranes, such as berries, Raw or undercooked vegetables, including corn, Dried beans, peas and lentils, Seeds and nuts and foods containing them, including peanut butter and other nut butters, Coconut, Popcorn

## 2025-03-15 NOTE — DISCHARGE NOTE PROVIDER - HOSPITAL COURSE
69yoM PMH BPH, HTN, hypothyroidism, AAA (4.2cm), prediabetes, CAD, HLD, and grade I NET of the ileum, active smoker, PSH discectomy and multiple other orthopedic surgeries.  He presents to [facility] for elective surgery. Taken to the OR on 3/14 and is now s/p RA Right hemicolectomy. Transferred to PACU in stable condition. No perioperative complications noted. Post operatively he was on ERAS protocl. On POD1, his brown catheter was removed and he passed his trial of void. Diet advanced as tolerated and per return of bowel function. At time of discharge pt is tolerating diet, pain well controlled, pt is ambulating/voiding freely, having adequate bowel function. Pt is HD stable and medically ready for discharge.

## 2025-03-15 NOTE — PROGRESS NOTE ADULT - ASSESSMENT
69yoM PMH BPH, HTN, hypothyroidism, AAA (4.2cm), prediabetes, CAD, HLD, and grade I NET of the ileum, PSH discectomy. Pt is a smoker. He presents for robotic right hemicolectomy (3/14) and is post operatively progressing well on ERAS protocol.    LRD  Pain/nausea control PRN   Home meds as appropriate   ISS  Plata catheter removed for TOV  SQH/SCDs/OOBA/IS   AM labs

## 2025-03-15 NOTE — PROGRESS NOTE ADULT - SUBJECTIVE AND OBJECTIVE BOX
INTERVAL HPI/OVERNIGHT EVENTS: No acute events    STATUS POST:  RA R hemicolectomy    POST OPERATIVE DAY #: 1    SUBJECTIVE: Pt seen and examined at bedside this am by surgery team. He is having some abdominal pain as well as discomfort around his brown catheter. He denies any nausea or vomiting. He is passing flatus.    MEDICATIONS  (STANDING):  amLODIPine   Tablet 5 milliGRAM(s) Oral daily  dextrose 5%. 1000 milliLiter(s) (50 mL/Hr) IV Continuous <Continuous>  dextrose 5%. 1000 milliLiter(s) (100 mL/Hr) IV Continuous <Continuous>  dextrose 50% Injectable 25 Gram(s) IV Push once  dextrose 50% Injectable 12.5 Gram(s) IV Push once  dextrose 50% Injectable 25 Gram(s) IV Push once  glucagon  Injectable 1 milliGRAM(s) IntraMuscular once  heparin   Injectable 5000 Unit(s) SubCutaneous every 8 hours  insulin lispro (ADMELOG) corrective regimen sliding scale   SubCutaneous Before meals and at bedtime  ketorolac   Injectable 15 milliGRAM(s) IV Push every 8 hours  levothyroxine 88 MICROGram(s) Oral daily  melatonin 5 milliGRAM(s) Oral at bedtime  methocarbamol 500 milliGRAM(s) Oral every 8 hours  tamsulosin 0.4 milliGRAM(s) Oral at bedtime  traZODone 50 milliGRAM(s) Oral at bedtime    MEDICATIONS  (PRN):  BUpivacaine liposome 1.3% Injectable (no eMAR) 20 milliLiter(s) Local Injection once PRN robotic right hemicolectomy  dextrose Oral Gel 15 Gram(s) Oral once PRN Blood Glucose LESS THAN 70 milliGRAM(s)/deciliter  HYDROmorphone  Injectable 0.25 milliGRAM(s) IV Push every 4 hours PRN breakthrough pain  naloxone Injectable 0.1 milliGRAM(s) IV Push every 3 minutes PRN For ANY of the following changes in patient status:  A. RR LESS THAN 10 breaths per minute, B. Oxygen saturation LESS THAN 90%, C. Sedation score of 6  ondansetron Injectable 4 milliGRAM(s) IV Push every 6 hours PRN Nausea  oxycodone    5 mG/acetaminophen 325 mG 1 Tablet(s) Oral every 4 hours PRN Severe Pain (7 - 10)      Vital Signs Last 24 Hrs  T(C): 36.3 (15 Mar 2025 08:57), Max: 36.8 (14 Mar 2025 20:15)  T(F): 97.4 (15 Mar 2025 08:57), Max: 98.2 (14 Mar 2025 20:15)  HR: 69 (15 Mar 2025 08:57) (63 - 101)  BP: 118/71 (15 Mar 2025 08:57) (105/56 - 145/88)  BP(mean): 85 (14 Mar 2025 16:16) (74 - 85)  RR: 17 (15 Mar 2025 08:57) (16 - 17)  SpO2: 96% (15 Mar 2025 08:57) (92% - 99%)    Parameters below as of 15 Mar 2025 08:57  Patient On (Oxygen Delivery Method): room air        Physical Exam:  Constitutional: A&Ox3, NAD  Respiratory: normal work of breathing  Cardiovascular: NSR, RRR  Abdomen: soft, tender in RLQ, non distended, no rebound or guarding  Incision: CDI  Genitourinary: Brown draining clear yellow urine  Legs: WWP, SCDs in place, no calf tenderness        I&O's Detail    14 Mar 2025 07:01  -  15 Mar 2025 07:00  --------------------------------------------------------  IN:    Lactated Ringers: 570 mL    Oral Fluid: 240 mL    Other (mL): 2000 mL  Total IN: 2810 mL    OUT:    Blood Loss (mL): 50 mL    Indwelling Catheter - Urethral (mL): 1610 mL  Total OUT: 1660 mL    Total NET: 1150 mL      15 Mar 2025 07:01  -  15 Mar 2025 11:48  --------------------------------------------------------  IN:  Total IN: 0 mL    OUT:    Indwelling Catheter - Urethral (mL): 1300 mL  Total OUT: 1300 mL    Total NET: -1300 mL          LABS:                        13.3   11.30 )-----------( 173      ( 15 Mar 2025 07:07 )             38.8     03-15    134[L]  |  101  |  9   ----------------------------<  123[H]  3.7   |  23  |  0.71    Ca    8.2[L]      15 Mar 2025 07:07  Phos  2.9     03-15  Mg     1.9     03-15    TPro  5.8[L]  /  Alb  3.5  /  TBili  0.4  /  DBili  x   /  AST  30  /  ALT  15  /  AlkPhos  67  03-15      Urinalysis Basic - ( 15 Mar 2025 07:07 )    Color: x / Appearance: x / SG: x / pH: x  Gluc: 123 mg/dL / Ketone: x  / Bili: x / Urobili: x   Blood: x / Protein: x / Nitrite: x   Leuk Esterase: x / RBC: x / WBC x   Sq Epi: x / Non Sq Epi: x / Bacteria: x        RADIOLOGY & ADDITIONAL STUDIES:

## 2025-03-15 NOTE — DISCHARGE NOTE PROVIDER - NSDCFUADDINST_GEN_ALL_CORE_FT
Please take Percocet 5-325mg every 4 hours for severe pain. Take colace 100mg daily while taking oxycodone to prevent constipation.    General Discharge Instructions:  Please resume all regular home medications unless specifically advised not to take a particular medication. Also, please take any new medications as prescribed.  Please get plenty of rest, continue to ambulate several times per day, and drink adequate amounts of fluids. Avoid lifting weights greater than 5-10 lbs until you follow-up with your surgeon, who will instruct you further regarding activity restrictions.  Avoid driving or operating heavy machinery while taking pain medications.  Please follow-up with your surgeon and Primary Care Provider (PCP) as advised.  Incision Care:  *Please call your doctor or nurse practitioner if you have increased pain, swelling, redness, or drainage from the incision site.  *Avoid swimming and baths until your follow-up appointment.  *You may shower, and wash surgical incisions with a mild soap and warm water. Gently pat the area dry.  *If you have staples, they will be removed at your follow-up appointment.  *If you have steri-strips, they will fall off on their own. Please remove any remaining strips 7-10 days after surgery.    Warning Signs:  Please call your doctor or nurse practitioner if you experience the following:  *You experience new chest pain, pressure, squeezing or tightness.  *New or worsening cough, shortness of breath, or wheeze.  *If you are vomiting and cannot keep down fluids or your medications.  *You are getting dehydrated due to continued vomiting, diarrhea, or other reasons. Signs of dehydration include dry mouth, rapid heartbeat, or feeling dizzy or faint when standing.  *You see blood or dark/black material when you vomit or have a bowel movement.  *You experience burning when you urinate, have blood in your urine, or experience a discharge.  *Your pain is not improving within 8-12 hours or is not gone within 24 hours. Call or return immediately if your pain is getting worse, changes location, or moves to your chest or back.  *You have shaking chills, or fever greater than 101.5 degrees Fahrenheit or 38 degrees Celsius.  *Any change in your symptoms, or any new symptoms that concern you.   Please take Oxycodone every 8 hours for severe pain as needed. Take colace 100mg daily while taking oxycodone to prevent constipation.    General Discharge Instructions:  Please resume all regular home medications unless specifically advised not to take a particular medication. Also, please take any new medications as prescribed.  Please get plenty of rest, continue to ambulate several times per day, and drink adequate amounts of fluids. Avoid lifting weights greater than 5-10 lbs until you follow-up with your surgeon, who will instruct you further regarding activity restrictions.  Avoid driving or operating heavy machinery while taking pain medications.  Please follow-up with your surgeon and Primary Care Provider (PCP) as advised.  Incision Care:  *Please call your doctor or nurse practitioner if you have increased pain, swelling, redness, or drainage from the incision site.  *Avoid swimming and baths until your follow-up appointment.  *You may shower, and wash surgical incisions with a mild soap and warm water. Gently pat the area dry.  *If you have staples, they will be removed at your follow-up appointment.  *If you have steri-strips, they will fall off on their own. Please remove any remaining strips 7-10 days after surgery.    Warning Signs:  Please call your doctor or nurse practitioner if you experience the following:  *You experience new chest pain, pressure, squeezing or tightness.  *New or worsening cough, shortness of breath, or wheeze.  *If you are vomiting and cannot keep down fluids or your medications.  *You are getting dehydrated due to continued vomiting, diarrhea, or other reasons. Signs of dehydration include dry mouth, rapid heartbeat, or feeling dizzy or faint when standing.  *You see blood or dark/black material when you vomit or have a bowel movement.  *You experience burning when you urinate, have blood in your urine, or experience a discharge.  *Your pain is not improving within 8-12 hours or is not gone within 24 hours. Call or return immediately if your pain is getting worse, changes location, or moves to your chest or back.  *You have shaking chills, or fever greater than 101.5 degrees Fahrenheit or 38 degrees Celsius.  *Any change in your symptoms, or any new symptoms that concern you.

## 2025-03-15 NOTE — DISCHARGE NOTE PROVIDER - NSDCFUSCHEDAPPT_GEN_ALL_CORE_FT
Yung Barakat  [facility]  [facility] [address]  Scheduled Appointment: 04/07/2025

## 2025-03-15 NOTE — DISCHARGE NOTE PROVIDER - NSDCMRMEDTOKEN_GEN_ALL_CORE_FT
amLODIPine 5 mg oral tablet: 1 tab(s) orally once a day  gabapentin 300 mg oral capsule: 1 cap(s) orally 3 times a day  levothyroxine 88 mcg (0.088 mg) oral tablet: 1 tab(s) orally once a day  metFORMIN 500 mg oral tablet, extended release: 1 tab(s) orally 2 times a day  methocarbamol 500 mg oral tablet: 1 tab(s) orally every 8 hours  tamsulosin 0.4 mg oral capsule: 1 cap(s) orally once a day (at bedtime)  traZODone 50 mg oral tablet: orally once a day (at bedtime)   amLODIPine 5 mg oral tablet: 1 tab(s) orally once a day  Colace 100 mg oral capsule: 1 cap(s) orally once a day as needed for  constipation MDD: 2 capsules  levothyroxine 88 mcg (0.088 mg) oral tablet: 1 tab(s) orally once a day  metFORMIN 500 mg oral tablet, extended release: 1 tab(s) orally 2 times a day  methocarbamol 500 mg oral tablet: 1 tab(s) orally every 8 hours  oxyCODONE 5 mg oral capsule: 1 cap(s) orally every 8 hours as needed for  severe pain MDD: 3 capsules  tamsulosin 0.4 mg oral capsule: 1 cap(s) orally once a day (at bedtime)  traZODone 50 mg oral tablet: orally once a day (at bedtime)

## 2025-03-15 NOTE — DISCHARGE NOTE PROVIDER - NSDCCPCAREPLAN_GEN_ALL_CORE_FT
PRINCIPAL DISCHARGE DIAGNOSIS  Diagnosis: Neuroendocrine tumor of ileum  Assessment and Plan of Treatment:

## 2025-03-16 LAB
HCT VFR BLD CALC: 41.8 % — SIGNIFICANT CHANGE UP (ref 39–50)
HGB BLD-MCNC: 14.5 G/DL — SIGNIFICANT CHANGE UP (ref 13–17)
MCHC RBC-ENTMCNC: 32.4 PG — SIGNIFICANT CHANGE UP (ref 27–34)
MCHC RBC-ENTMCNC: 34.7 G/DL — SIGNIFICANT CHANGE UP (ref 32–36)
MCV RBC AUTO: 93.3 FL — SIGNIFICANT CHANGE UP (ref 80–100)
NRBC BLD AUTO-RTO: 0 /100 WBCS — SIGNIFICANT CHANGE UP (ref 0–0)
PLATELET # BLD AUTO: 164 K/UL — SIGNIFICANT CHANGE UP (ref 150–400)
RBC # BLD: 4.48 M/UL — SIGNIFICANT CHANGE UP (ref 4.2–5.8)
RBC # FLD: 13.1 % — SIGNIFICANT CHANGE UP (ref 10.3–14.5)
WBC # BLD: 6.06 K/UL — SIGNIFICANT CHANGE UP (ref 3.8–10.5)

## 2025-03-16 RX ADMIN — TAMSULOSIN HYDROCHLORIDE 0.4 MILLIGRAM(S): 0.4 CAPSULE ORAL at 21:59

## 2025-03-16 RX ADMIN — AMLODIPINE BESYLATE 5 MILLIGRAM(S): 10 TABLET ORAL at 06:05

## 2025-03-16 RX ADMIN — KETOROLAC TROMETHAMINE 15 MILLIGRAM(S): 30 INJECTION, SOLUTION INTRAMUSCULAR; INTRAVENOUS at 13:37

## 2025-03-16 RX ADMIN — Medication 5 MILLIGRAM(S): at 21:59

## 2025-03-16 RX ADMIN — Medication 88 MICROGRAM(S): at 06:07

## 2025-03-16 RX ADMIN — KETOROLAC TROMETHAMINE 15 MILLIGRAM(S): 30 INJECTION, SOLUTION INTRAMUSCULAR; INTRAVENOUS at 06:05

## 2025-03-16 RX ADMIN — METHOCARBAMOL 500 MILLIGRAM(S): 500 TABLET, FILM COATED ORAL at 13:37

## 2025-03-16 RX ADMIN — METHOCARBAMOL 500 MILLIGRAM(S): 500 TABLET, FILM COATED ORAL at 21:59

## 2025-03-16 RX ADMIN — Medication 50 MILLIGRAM(S): at 21:59

## 2025-03-16 RX ADMIN — METHOCARBAMOL 500 MILLIGRAM(S): 500 TABLET, FILM COATED ORAL at 06:05

## 2025-03-16 RX ADMIN — KETOROLAC TROMETHAMINE 15 MILLIGRAM(S): 30 INJECTION, SOLUTION INTRAMUSCULAR; INTRAVENOUS at 21:59

## 2025-03-16 NOTE — PROGRESS NOTE ADULT - ASSESSMENT
69yoM PMH BPH, HTN, hypothyroidism, AAA (4.2cm), prediabetes, CAD, HLD, and grade I NET of the ileum, PSH discectomy. Pt is a smoker. He presents for robotic right hemicolectomy (3/14) and is post operatively progressing well on ERAS protocol.    LRD  Pain/nausea control PRN   Home meds as appropriate   ISS  SQH/SCDs/OOBA/IS   No AM labs

## 2025-03-16 NOTE — PROGRESS NOTE ADULT - SUBJECTIVE AND OBJECTIVE BOX
SUBJECTIVE:      MEDICATIONS  (STANDING):  amLODIPine   Tablet 5 milliGRAM(s) Oral daily  dextrose 5%. 1000 milliLiter(s) (50 mL/Hr) IV Continuous <Continuous>  dextrose 5%. 1000 milliLiter(s) (100 mL/Hr) IV Continuous <Continuous>  dextrose 50% Injectable 25 Gram(s) IV Push once  dextrose 50% Injectable 12.5 Gram(s) IV Push once  dextrose 50% Injectable 25 Gram(s) IV Push once  glucagon  Injectable 1 milliGRAM(s) IntraMuscular once  heparin   Injectable 5000 Unit(s) SubCutaneous every 8 hours  insulin lispro (ADMELOG) corrective regimen sliding scale   SubCutaneous Before meals and at bedtime  ketorolac   Injectable 15 milliGRAM(s) IV Push every 8 hours  levothyroxine 88 MICROGram(s) Oral daily  melatonin 5 milliGRAM(s) Oral at bedtime  methocarbamol 500 milliGRAM(s) Oral every 8 hours  tamsulosin 0.4 milliGRAM(s) Oral at bedtime  traZODone 50 milliGRAM(s) Oral at bedtime    MEDICATIONS  (PRN):  BUpivacaine liposome 1.3% Injectable (no eMAR) 20 milliLiter(s) Local Injection once PRN robotic right hemicolectomy  dextrose Oral Gel 15 Gram(s) Oral once PRN Blood Glucose LESS THAN 70 milliGRAM(s)/deciliter  naloxone Injectable 0.1 milliGRAM(s) IV Push every 3 minutes PRN For ANY of the following changes in patient status:  A. RR LESS THAN 10 breaths per minute, B. Oxygen saturation LESS THAN 90%, C. Sedation score of 6  ondansetron Injectable 4 milliGRAM(s) IV Push every 6 hours PRN Nausea  oxycodone    5 mG/acetaminophen 325 mG 1 Tablet(s) Oral every 4 hours PRN Severe Pain (7 - 10)      Vital Signs Last 24 Hrs  T(C): 36.6 (16 Mar 2025 04:26), Max: 36.6 (15 Mar 2025 14:00)  T(F): 97.9 (16 Mar 2025 04:26), Max: 97.9 (16 Mar 2025 04:26)  HR: 72 (16 Mar 2025 04:26) (69 - 79)  BP: 158/92 (16 Mar 2025 04:26) (118/71 - 158/92)  BP(mean): --  RR: 18 (16 Mar 2025 04:26) (17 - 18)  SpO2: 97% (16 Mar 2025 04:26) (94% - 97%)    Parameters below as of 16 Mar 2025 04:26  Patient On (Oxygen Delivery Method): room air        PHYSICAL EXAM:      Constitutional: A&Ox3    Breasts:    Respiratory: non labored breathing, no respiratory distress    Cardiovascular: NSR, RRR    Gastrointestinal:                 Incision:    Genitourinary:    Extremities: (-) edema                  I&O's Detail    14 Mar 2025 07:01  -  15 Mar 2025 07:00  --------------------------------------------------------  IN:    Lactated Ringers: 570 mL    Oral Fluid: 240 mL    Other (mL): 2000 mL  Total IN: 2810 mL    OUT:    Blood Loss (mL): 50 mL    Indwelling Catheter - Urethral (mL): 1610 mL  Total OUT: 1660 mL    Total NET: 1150 mL      15 Mar 2025 07:01  -  16 Mar 2025 06:42  --------------------------------------------------------  IN:    Oral Fluid: 1397 mL  Total IN: 1397 mL    OUT:    Indwelling Catheter - Urethral (mL): 1300 mL    Voided (mL): 3350 mL  Total OUT: 4650 mL    Total NET: -3253 mL          LABS:                        13.3   11.30 )-----------( 173      ( 15 Mar 2025 07:07 )             38.8     03-15    134[L]  |  101  |  9   ----------------------------<  123[H]  3.7   |  23  |  0.71    Ca    8.2[L]      15 Mar 2025 07:07  Phos  2.9     03-15  Mg     1.9     03-15    TPro  5.8[L]  /  Alb  3.5  /  TBili  0.4  /  DBili  x   /  AST  30  /  ALT  15  /  AlkPhos  67  03-15      Urinalysis Basic - ( 15 Mar 2025 07:07 )    Color: x / Appearance: x / SG: x / pH: x  Gluc: 123 mg/dL / Ketone: x  / Bili: x / Urobili: x   Blood: x / Protein: x / Nitrite: x   Leuk Esterase: x / RBC: x / WBC x   Sq Epi: x / Non Sq Epi: x / Bacteria: x        RADIOLOGY & ADDITIONAL STUDIES: SUBJECTIVE:      MEDICATIONS  (STANDING):  amLODIPine   Tablet 5 milliGRAM(s) Oral daily  dextrose 5%. 1000 milliLiter(s) (50 mL/Hr) IV Continuous <Continuous>  dextrose 5%. 1000 milliLiter(s) (100 mL/Hr) IV Continuous <Continuous>  dextrose 50% Injectable 25 Gram(s) IV Push once  dextrose 50% Injectable 12.5 Gram(s) IV Push once  dextrose 50% Injectable 25 Gram(s) IV Push once  glucagon  Injectable 1 milliGRAM(s) IntraMuscular once  heparin   Injectable 5000 Unit(s) SubCutaneous every 8 hours  insulin lispro (ADMELOG) corrective regimen sliding scale   SubCutaneous Before meals and at bedtime  ketorolac   Injectable 15 milliGRAM(s) IV Push every 8 hours  levothyroxine 88 MICROGram(s) Oral daily  melatonin 5 milliGRAM(s) Oral at bedtime  methocarbamol 500 milliGRAM(s) Oral every 8 hours  tamsulosin 0.4 milliGRAM(s) Oral at bedtime  traZODone 50 milliGRAM(s) Oral at bedtime    MEDICATIONS  (PRN):  BUpivacaine liposome 1.3% Injectable (no eMAR) 20 milliLiter(s) Local Injection once PRN robotic right hemicolectomy  dextrose Oral Gel 15 Gram(s) Oral once PRN Blood Glucose LESS THAN 70 milliGRAM(s)/deciliter  naloxone Injectable 0.1 milliGRAM(s) IV Push every 3 minutes PRN For ANY of the following changes in patient status:  A. RR LESS THAN 10 breaths per minute, B. Oxygen saturation LESS THAN 90%, C. Sedation score of 6  ondansetron Injectable 4 milliGRAM(s) IV Push every 6 hours PRN Nausea  oxycodone    5 mG/acetaminophen 325 mG 1 Tablet(s) Oral every 4 hours PRN Severe Pain (7 - 10)      Vital Signs Last 24 Hrs  T(C): 36.6 (16 Mar 2025 04:26), Max: 36.6 (15 Mar 2025 14:00)  T(F): 97.9 (16 Mar 2025 04:26), Max: 97.9 (16 Mar 2025 04:26)  HR: 72 (16 Mar 2025 04:26) (69 - 79)  BP: 158/92 (16 Mar 2025 04:26) (118/71 - 158/92)  BP(mean): --  RR: 18 (16 Mar 2025 04:26) (17 - 18)  SpO2: 97% (16 Mar 2025 04:26) (94% - 97%)    Parameters below as of 16 Mar 2025 04:26  Patient On (Oxygen Delivery Method): room air        Physical Exam:  Constitutional: A&Ox3, NAD  Respiratory: normal work of breathing  Cardiovascular: NSR, RRR  Abdomen: soft, tender in RLQ, non distended, no rebound or guarding  Incision: CDI  Genitourinary: Plata draining clear yellow urine  Legs: WWP, SCDs in place, no calf tenderness                  I&O's Detail    14 Mar 2025 07:01  -  15 Mar 2025 07:00  --------------------------------------------------------  IN:    Lactated Ringers: 570 mL    Oral Fluid: 240 mL    Other (mL): 2000 mL  Total IN: 2810 mL    OUT:    Blood Loss (mL): 50 mL    Indwelling Catheter - Urethral (mL): 1610 mL  Total OUT: 1660 mL    Total NET: 1150 mL      15 Mar 2025 07:01  -  16 Mar 2025 06:42  --------------------------------------------------------  IN:    Oral Fluid: 1397 mL  Total IN: 1397 mL    OUT:    Indwelling Catheter - Urethral (mL): 1300 mL    Voided (mL): 3350 mL  Total OUT: 4650 mL    Total NET: -3253 mL          LABS:                        13.3   11.30 )-----------( 173      ( 15 Mar 2025 07:07 )             38.8     03-15    134[L]  |  101  |  9   ----------------------------<  123[H]  3.7   |  23  |  0.71    Ca    8.2[L]      15 Mar 2025 07:07  Phos  2.9     03-15  Mg     1.9     03-15    TPro  5.8[L]  /  Alb  3.5  /  TBili  0.4  /  DBili  x   /  AST  30  /  ALT  15  /  AlkPhos  67  03-15      Urinalysis Basic - ( 15 Mar 2025 07:07 )    Color: x / Appearance: x / SG: x / pH: x  Gluc: 123 mg/dL / Ketone: x  / Bili: x / Urobili: x   Blood: x / Protein: x / Nitrite: x   Leuk Esterase: x / RBC: x / WBC x   Sq Epi: x / Non Sq Epi: x / Bacteria: x        RADIOLOGY & ADDITIONAL STUDIES: SUBJECTIVE: Pt seen and examined at bedside this AM. He endorses flatus and Bm overnight, denies abdominal pain. nausea, or vomiting.       MEDICATIONS  (STANDING):  amLODIPine   Tablet 5 milliGRAM(s) Oral daily  dextrose 5%. 1000 milliLiter(s) (50 mL/Hr) IV Continuous <Continuous>  dextrose 5%. 1000 milliLiter(s) (100 mL/Hr) IV Continuous <Continuous>  dextrose 50% Injectable 25 Gram(s) IV Push once  dextrose 50% Injectable 12.5 Gram(s) IV Push once  dextrose 50% Injectable 25 Gram(s) IV Push once  glucagon  Injectable 1 milliGRAM(s) IntraMuscular once  heparin   Injectable 5000 Unit(s) SubCutaneous every 8 hours  insulin lispro (ADMELOG) corrective regimen sliding scale   SubCutaneous Before meals and at bedtime  ketorolac   Injectable 15 milliGRAM(s) IV Push every 8 hours  levothyroxine 88 MICROGram(s) Oral daily  melatonin 5 milliGRAM(s) Oral at bedtime  methocarbamol 500 milliGRAM(s) Oral every 8 hours  tamsulosin 0.4 milliGRAM(s) Oral at bedtime  traZODone 50 milliGRAM(s) Oral at bedtime    MEDICATIONS  (PRN):  BUpivacaine liposome 1.3% Injectable (no eMAR) 20 milliLiter(s) Local Injection once PRN robotic right hemicolectomy  dextrose Oral Gel 15 Gram(s) Oral once PRN Blood Glucose LESS THAN 70 milliGRAM(s)/deciliter  naloxone Injectable 0.1 milliGRAM(s) IV Push every 3 minutes PRN For ANY of the following changes in patient status:  A. RR LESS THAN 10 breaths per minute, B. Oxygen saturation LESS THAN 90%, C. Sedation score of 6  ondansetron Injectable 4 milliGRAM(s) IV Push every 6 hours PRN Nausea  oxycodone    5 mG/acetaminophen 325 mG 1 Tablet(s) Oral every 4 hours PRN Severe Pain (7 - 10)      Vital Signs Last 24 Hrs  T(C): 36.6 (16 Mar 2025 04:26), Max: 36.6 (15 Mar 2025 14:00)  T(F): 97.9 (16 Mar 2025 04:26), Max: 97.9 (16 Mar 2025 04:26)  HR: 72 (16 Mar 2025 04:26) (69 - 79)  BP: 158/92 (16 Mar 2025 04:26) (118/71 - 158/92)  BP(mean): --  RR: 18 (16 Mar 2025 04:26) (17 - 18)  SpO2: 97% (16 Mar 2025 04:26) (94% - 97%)    Parameters below as of 16 Mar 2025 04:26  Patient On (Oxygen Delivery Method): room air        Physical Exam:  Constitutional: A&Ox3, NAD  Respiratory: normal work of breathing  Cardiovascular: NSR, RRR  Abdomen: soft, tender in RLQ, non distended, no rebound or guarding  Incision: CDI  Legs: WWP, SCDs in place, no calf tenderness                  I&O's Detail    14 Mar 2025 07:01  -  15 Mar 2025 07:00  --------------------------------------------------------  IN:    Lactated Ringers: 570 mL    Oral Fluid: 240 mL    Other (mL): 2000 mL  Total IN: 2810 mL    OUT:    Blood Loss (mL): 50 mL    Indwelling Catheter - Urethral (mL): 1610 mL  Total OUT: 1660 mL    Total NET: 1150 mL      15 Mar 2025 07:01  -  16 Mar 2025 06:42  --------------------------------------------------------  IN:    Oral Fluid: 1397 mL  Total IN: 1397 mL    OUT:    Indwelling Catheter - Urethral (mL): 1300 mL    Voided (mL): 3350 mL  Total OUT: 4650 mL    Total NET: -3253 mL          LABS:                        13.3   11.30 )-----------( 173      ( 15 Mar 2025 07:07 )             38.8     03-15    134[L]  |  101  |  9   ----------------------------<  123[H]  3.7   |  23  |  0.71    Ca    8.2[L]      15 Mar 2025 07:07  Phos  2.9     03-15  Mg     1.9     03-15    TPro  5.8[L]  /  Alb  3.5  /  TBili  0.4  /  DBili  x   /  AST  30  /  ALT  15  /  AlkPhos  67  03-15      Urinalysis Basic - ( 15 Mar 2025 07:07 )    Color: x / Appearance: x / SG: x / pH: x  Gluc: 123 mg/dL / Ketone: x  / Bili: x / Urobili: x   Blood: x / Protein: x / Nitrite: x   Leuk Esterase: x / RBC: x / WBC x   Sq Epi: x / Non Sq Epi: x / Bacteria: x        RADIOLOGY & ADDITIONAL STUDIES:

## 2025-03-17 ENCOUNTER — TRANSCRIPTION ENCOUNTER (OUTPATIENT)
Age: 70
End: 2025-03-17

## 2025-03-17 VITALS
HEART RATE: 96 BPM | RESPIRATION RATE: 19 BRPM | SYSTOLIC BLOOD PRESSURE: 124 MMHG | DIASTOLIC BLOOD PRESSURE: 76 MMHG | TEMPERATURE: 97 F | OXYGEN SATURATION: 94 %

## 2025-03-17 PROCEDURE — 88342 IMHCHEM/IMCYTCHM 1ST ANTB: CPT

## 2025-03-17 PROCEDURE — 84100 ASSAY OF PHOSPHORUS: CPT

## 2025-03-17 PROCEDURE — 88309 TISSUE EXAM BY PATHOLOGIST: CPT

## 2025-03-17 PROCEDURE — 86900 BLOOD TYPING SEROLOGIC ABO: CPT

## 2025-03-17 PROCEDURE — 36415 COLL VENOUS BLD VENIPUNCTURE: CPT

## 2025-03-17 PROCEDURE — 86850 RBC ANTIBODY SCREEN: CPT

## 2025-03-17 PROCEDURE — C9399: CPT

## 2025-03-17 PROCEDURE — 86901 BLOOD TYPING SEROLOGIC RH(D): CPT

## 2025-03-17 PROCEDURE — 83036 HEMOGLOBIN GLYCOSYLATED A1C: CPT

## 2025-03-17 PROCEDURE — C1889: CPT

## 2025-03-17 PROCEDURE — 80053 COMPREHEN METABOLIC PANEL: CPT

## 2025-03-17 PROCEDURE — 86316 IMMUNOASSAY TUMOR OTHER: CPT

## 2025-03-17 PROCEDURE — 82962 GLUCOSE BLOOD TEST: CPT

## 2025-03-17 PROCEDURE — 88360 TUMOR IMMUNOHISTOCHEM/MANUAL: CPT

## 2025-03-17 PROCEDURE — 85025 COMPLETE CBC W/AUTO DIFF WBC: CPT

## 2025-03-17 PROCEDURE — 85027 COMPLETE CBC AUTOMATED: CPT

## 2025-03-17 PROCEDURE — 83735 ASSAY OF MAGNESIUM: CPT

## 2025-03-17 PROCEDURE — 88341 IMHCHEM/IMCYTCHM EA ADD ANTB: CPT

## 2025-03-17 PROCEDURE — S2900: CPT

## 2025-03-17 RX ORDER — OXYCODONE HYDROCHLORIDE 30 MG/1
1 TABLET ORAL
Qty: 42 | Refills: 0
Start: 2025-03-17 | End: 2025-03-30

## 2025-03-17 RX ORDER — DOCUSATE SODIUM 100 MG
1 CAPSULE ORAL
Qty: 30 | Refills: 0
Start: 2025-03-17 | End: 2025-04-15

## 2025-03-17 RX ORDER — DOCUSATE SODIUM 100 MG
1 CAPSULE ORAL
Qty: 14 | Refills: 0
Start: 2025-03-17 | End: 2025-03-30

## 2025-03-17 RX ADMIN — AMLODIPINE BESYLATE 5 MILLIGRAM(S): 10 TABLET ORAL at 05:16

## 2025-03-17 RX ADMIN — Medication 88 MICROGRAM(S): at 05:16

## 2025-03-17 RX ADMIN — KETOROLAC TROMETHAMINE 15 MILLIGRAM(S): 30 INJECTION, SOLUTION INTRAMUSCULAR; INTRAVENOUS at 05:16

## 2025-03-17 RX ADMIN — METHOCARBAMOL 500 MILLIGRAM(S): 500 TABLET, FILM COATED ORAL at 05:15

## 2025-03-17 NOTE — DISCHARGE NOTE NURSING/CASE MANAGEMENT/SOCIAL WORK - PATIENT PORTAL LINK FT
You can access the FollowMyHealth Patient Portal offered by [facility] by registering at the following website: [email or web address]. By joining [facility]’s FollowMyHealth portal, you will also be able to view your health information using other applications (apps) compatible with our system.

## 2025-03-17 NOTE — PROGRESS NOTE ADULT - ASSESSMENT
69yoM PMH BPH, HTN, hypothyroidism, AAA (4.2cm), prediabetes, CAD, HLD, and grade I NET of the ileum, PSH discectomy. Pt is a smoker. He presents for robotic right hemicolectomy (3/14) and is post operatively progressing well on ERAS protocol.    LRD  Pain/nausea control PRN   Home meds as appropriate   ISS  SQH/SCDs/OOBA/IS   No AM labs   Dispo: Home

## 2025-03-17 NOTE — PROGRESS NOTE ADULT - SUBJECTIVE AND OBJECTIVE BOX
SUBJECTIVE: Pt seen on rounds this AM. Endorses requiring around the clock medications for pain control. Otherwise denies N/V, and is tolerating diet.      MEDICATIONS  (STANDING):  amLODIPine   Tablet 5 milliGRAM(s) Oral daily  dextrose 5%. 1000 milliLiter(s) (100 mL/Hr) IV Continuous <Continuous>  dextrose 5%. 1000 milliLiter(s) (50 mL/Hr) IV Continuous <Continuous>  dextrose 50% Injectable 25 Gram(s) IV Push once  dextrose 50% Injectable 12.5 Gram(s) IV Push once  dextrose 50% Injectable 25 Gram(s) IV Push once  glucagon  Injectable 1 milliGRAM(s) IntraMuscular once  heparin   Injectable 5000 Unit(s) SubCutaneous every 8 hours  insulin lispro (ADMELOG) corrective regimen sliding scale   SubCutaneous Before meals and at bedtime  ketorolac   Injectable 15 milliGRAM(s) IV Push every 8 hours  levothyroxine 88 MICROGram(s) Oral daily  melatonin 5 milliGRAM(s) Oral at bedtime  methocarbamol 500 milliGRAM(s) Oral every 8 hours  tamsulosin 0.4 milliGRAM(s) Oral at bedtime  traZODone 50 milliGRAM(s) Oral at bedtime    MEDICATIONS  (PRN):  BUpivacaine liposome 1.3% Injectable (no eMAR) 20 milliLiter(s) Local Injection once PRN robotic right hemicolectomy  dextrose Oral Gel 15 Gram(s) Oral once PRN Blood Glucose LESS THAN 70 milliGRAM(s)/deciliter  naloxone Injectable 0.1 milliGRAM(s) IV Push every 3 minutes PRN For ANY of the following changes in patient status:  A. RR LESS THAN 10 breaths per minute, B. Oxygen saturation LESS THAN 90%, C. Sedation score of 6  ondansetron Injectable 4 milliGRAM(s) IV Push every 6 hours PRN Nausea  oxycodone    5 mG/acetaminophen 325 mG 1 Tablet(s) Oral every 4 hours PRN Severe Pain (7 - 10)      Vital Signs Last 24 Hrs  T(C): 36.6 (17 Mar 2025 05:22), Max: 36.7 (16 Mar 2025 16:58)  T(F): 97.8 (17 Mar 2025 05:22), Max: 98 (16 Mar 2025 16:58)  HR: 74 (17 Mar 2025 05:22) (70 - 84)  BP: 135/78 (17 Mar 2025 05:22) (132/72 - 162/94)  BP(mean): 113 (16 Mar 2025 20:54) (113 - 117)  RR: 17 (17 Mar 2025 05:22) (16 - 18)  SpO2: 96% (17 Mar 2025 05:22) (95% - 98%)    Parameters below as of 17 Mar 2025 05:22  Patient On (Oxygen Delivery Method): room air        PHYSICAL EXAM:      Constitutional: A&Ox3    Respiratory: non labored breathing, no respiratory distress    Cardiovascular: NSR, RRR    Gastrointestinal: soft, TTP terrie-incisionally, ND                 Incision: C/D/I    Extremities: (-) edema                  I&O's Detail    16 Mar 2025 07:01  -  17 Mar 2025 07:00  --------------------------------------------------------  IN:  Total IN: 0 mL    OUT:    Voided (mL): 2850 mL  Total OUT: 2850 mL    Total NET: -2850 mL          LABS:                        14.5   6.06  )-----------( 164      ( 16 Mar 2025 11:57 )             41.8                 RADIOLOGY & ADDITIONAL STUDIES:

## 2025-03-17 NOTE — DISCHARGE NOTE NURSING/CASE MANAGEMENT/SOCIAL WORK - FINANCIAL ASSISTANCE
[facility] provides services at a reduced cost to those who are determined to be eligible through [facility]’s financial assistance program. Information regarding [facility]’s financial assistance program can be found by going to [email or web address] or by calling [phone].

## 2025-03-17 NOTE — DISCHARGE NOTE NURSING/CASE MANAGEMENT/SOCIAL WORK - NSDCPEFALRISK_GEN_ALL_CORE
For information on Fall & Injury Prevention, visit: [email or web address] OR  [email or web address] OR  https://www.cdc.gov/steadi/patient.html

## 2025-03-20 ENCOUNTER — APPOINTMENT (OUTPATIENT)
Dept: INTERNAL MEDICINE | Facility: CLINIC | Age: 70
End: 2025-03-20
Payer: MEDICARE

## 2025-03-20 VITALS
BODY MASS INDEX: 22.68 KG/M2 | DIASTOLIC BLOOD PRESSURE: 74 MMHG | WEIGHT: 162 LBS | HEART RATE: 87 BPM | HEIGHT: 71 IN | TEMPERATURE: 97.2 F | SYSTOLIC BLOOD PRESSURE: 113 MMHG | OXYGEN SATURATION: 97 %

## 2025-03-20 DIAGNOSIS — K59.00 CONSTIPATION, UNSPECIFIED: ICD-10-CM

## 2025-03-20 DIAGNOSIS — F43.20 ADJUSTMENT DISORDER, UNSPECIFIED: ICD-10-CM

## 2025-03-21 ENCOUNTER — APPOINTMENT (OUTPATIENT)
Dept: INTERNAL MEDICINE | Facility: CLINIC | Age: 70
End: 2025-03-21
Payer: MEDICARE

## 2025-03-21 DIAGNOSIS — Z98.890 OTHER SPECIFIED POSTPROCEDURAL STATES: ICD-10-CM

## 2025-03-21 PROCEDURE — 99212 OFFICE O/P EST SF 10 MIN: CPT

## 2025-03-24 PROBLEM — Z98.890 POST-OPERATIVE STATE: Status: ACTIVE | Noted: 2025-03-24

## 2025-03-26 ENCOUNTER — APPOINTMENT (OUTPATIENT)
Dept: COLORECTAL SURGERY | Facility: CLINIC | Age: 70
End: 2025-03-26
Payer: MEDICARE

## 2025-03-26 VITALS
TEMPERATURE: 100.2 F | HEIGHT: 71 IN | HEART RATE: 71 BPM | BODY MASS INDEX: 22.4 KG/M2 | SYSTOLIC BLOOD PRESSURE: 135 MMHG | WEIGHT: 160 LBS | DIASTOLIC BLOOD PRESSURE: 88 MMHG

## 2025-03-26 DIAGNOSIS — D3A.8 OTHER BENIGN NEUROENDOCRINE TUMORS: ICD-10-CM

## 2025-03-26 PROCEDURE — 99024 POSTOP FOLLOW-UP VISIT: CPT

## 2025-03-28 RX ORDER — OXYCODONE 5 MG/1
5 TABLET ORAL EVERY 6 HOURS
Qty: 5 | Refills: 0 | Status: ACTIVE | COMMUNITY
Start: 2025-03-28 | End: 1900-01-01

## 2025-04-02 ENCOUNTER — NON-APPOINTMENT (OUTPATIENT)
Age: 70
End: 2025-04-02

## 2025-04-07 ENCOUNTER — APPOINTMENT (OUTPATIENT)
Dept: HEMATOLOGY ONCOLOGY | Facility: CLINIC | Age: 70
End: 2025-04-07
Payer: MEDICARE

## 2025-04-07 VITALS
RESPIRATION RATE: 18 BRPM | SYSTOLIC BLOOD PRESSURE: 128 MMHG | WEIGHT: 165 LBS | HEIGHT: 71 IN | HEART RATE: 91 BPM | DIASTOLIC BLOOD PRESSURE: 82 MMHG | TEMPERATURE: 97.6 F | BODY MASS INDEX: 23.1 KG/M2 | OXYGEN SATURATION: 95 %

## 2025-04-07 LAB
ALBUMIN SERPL ELPH-MCNC: 3.7 G/DL
ALP BLD-CCNC: 75 U/L
ALT SERPL-CCNC: 23 U/L
ANION GAP SERPL CALC-SCNC: 4 MMOL/L
AST SERPL-CCNC: 29 U/L
BILIRUB SERPL-MCNC: 0.7 MG/DL
BUN SERPL-MCNC: 14 MG/DL
CALCIUM SERPL-MCNC: 10.2 MG/DL
CHLORIDE SERPL-SCNC: 112 MMOL/L
CO2 SERPL-SCNC: 25 MMOL/L
CREAT SERPL-MCNC: 1.1 MG/DL
EGFRCR SERPLBLD CKD-EPI 2021: 73 ML/MIN/1.73M2
GLUCOSE SERPL-MCNC: 130 MG/DL
HCT VFR BLD CALC: 43 %
HGB BLD-MCNC: 14.8 G/DL
LYMPHOCYTES # BLD AUTO: 1.4 K/UL
LYMPHOCYTES NFR BLD AUTO: 23.5 %
MAGNESIUM SERPL-MCNC: 1.9 MG/DL
MAN DIFF?: NO
MCHC RBC-ENTMCNC: 30.8 PG
MCHC RBC-ENTMCNC: 34.4 G/DL
MCV RBC AUTO: 89.6 FL
NEUTROPHILS # BLD AUTO: 4 K/UL
NEUTROPHILS NFR BLD AUTO: 67.4 %
PLATELET # BLD AUTO: 217 K/UL
POTASSIUM SERPL-SCNC: 4.9 MMOL/L
PROT SERPL-MCNC: 6.8 G/DL
RBC # BLD: 4.8 M/UL
RBC # FLD: 13.2 %
SODIUM SERPL-SCNC: 141 MMOL/L
WBC # FLD AUTO: 5.9 K/UL

## 2025-04-07 PROCEDURE — 99215 OFFICE O/P EST HI 40 MIN: CPT

## 2025-04-09 ENCOUNTER — APPOINTMENT (OUTPATIENT)
Dept: COLORECTAL SURGERY | Facility: CLINIC | Age: 70
End: 2025-04-09
Payer: MEDICARE

## 2025-04-09 VITALS
HEART RATE: 97 BPM | SYSTOLIC BLOOD PRESSURE: 137 MMHG | TEMPERATURE: 98.2 F | WEIGHT: 160 LBS | HEIGHT: 71 IN | DIASTOLIC BLOOD PRESSURE: 83 MMHG | BODY MASS INDEX: 22.4 KG/M2

## 2025-04-09 PROCEDURE — 99024 POSTOP FOLLOW-UP VISIT: CPT

## 2025-04-14 ENCOUNTER — APPOINTMENT (OUTPATIENT)
Dept: PALLIATIVE MEDICINE | Facility: CLINIC | Age: 70
End: 2025-04-14
Payer: MEDICARE

## 2025-04-14 VITALS
TEMPERATURE: 97.1 F | BODY MASS INDEX: 22.4 KG/M2 | DIASTOLIC BLOOD PRESSURE: 78 MMHG | SYSTOLIC BLOOD PRESSURE: 126 MMHG | HEIGHT: 71 IN | HEART RATE: 95 BPM | OXYGEN SATURATION: 98 % | WEIGHT: 160 LBS | RESPIRATION RATE: 18 BRPM

## 2025-04-14 DIAGNOSIS — Z51.5 ENCOUNTER FOR PALLIATIVE CARE: ICD-10-CM

## 2025-04-14 DIAGNOSIS — R10.9 UNSPECIFIED ABDOMINAL PAIN: ICD-10-CM

## 2025-04-14 DIAGNOSIS — D3A.8 OTHER BENIGN NEUROENDOCRINE TUMORS: ICD-10-CM

## 2025-04-14 PROCEDURE — 99215 OFFICE O/P EST HI 40 MIN: CPT

## 2025-04-22 ENCOUNTER — APPOINTMENT (OUTPATIENT)
Dept: INTERNAL MEDICINE | Facility: CLINIC | Age: 70
End: 2025-04-22
Payer: MEDICARE

## 2025-04-22 VITALS
SYSTOLIC BLOOD PRESSURE: 146 MMHG | DIASTOLIC BLOOD PRESSURE: 78 MMHG | BODY MASS INDEX: 23.1 KG/M2 | HEIGHT: 71 IN | HEART RATE: 87 BPM | WEIGHT: 165 LBS | TEMPERATURE: 97.2 F | OXYGEN SATURATION: 97 %

## 2025-04-22 DIAGNOSIS — D21.9 BENIGN NEOPLASM OF CONNECTIVE AND OTHER SOFT TISSUE, UNSPECIFIED: ICD-10-CM

## 2025-04-22 DIAGNOSIS — G47.00 INSOMNIA, UNSPECIFIED: ICD-10-CM

## 2025-04-22 PROCEDURE — 99213 OFFICE O/P EST LOW 20 MIN: CPT | Mod: GC

## 2025-04-24 NOTE — ED ADULT NURSE NOTE - HOW PATIENT ADDRESSED, PROFILE
Thank you so much for choosing me to provide your care today!    If you were dilated your vision may remain blurry   or light sensitive for several hours.    The nature of eye and vision problems can require frequent follow up, please make every effort to adhere to any future appointments.    If you have any issues, questions, or concerns,   please do not hesitate to reach out.    If you receive a survey in regards to your care today, please mention any exceptional care my office staff and/or technicians provided.    You can reach our office at this number:    [phone]    Please consider signing up for and utilizing [employer]!  This is the best way to directly reach me or other  providers    Artificial Tears/lubricating drops  Recommendations for daily use      Refresh  [x]Refresh Tears []Refresh Advance  []Refresh Optive  []Refresh LiquiGel  []Refresh preservative free in single use vial    Systane  [x]Systane ultra []Systane Balance  []Systane Gel   []Systane preservative free in single use vial    Genteal  []Genteal  []Genteal gel    Blink  []Blink   []Blink for contacts      You may use these drops:  [x]1 drop 2-4 times daily  []1 drop 4 times daily  []1 drop every 1-2 hours or as needed  []At bedtime    It is OK to substitute generic store brand varieties of drops. Artificial tears work best when used consistently instead of as needed.    *Avoid any drops for allergy/itching/redness unless directed otherwise*   Kash

## 2025-07-08 ENCOUNTER — APPOINTMENT (OUTPATIENT)
Dept: INTERNAL MEDICINE | Facility: CLINIC | Age: 70
End: 2025-07-08

## 2025-07-09 ENCOUNTER — NON-APPOINTMENT (OUTPATIENT)
Age: 70
End: 2025-07-09

## 2025-07-09 ENCOUNTER — APPOINTMENT (OUTPATIENT)
Dept: COLORECTAL SURGERY | Facility: CLINIC | Age: 70
End: 2025-07-09
Payer: MEDICARE

## 2025-07-09 VITALS
HEART RATE: 88 BPM | SYSTOLIC BLOOD PRESSURE: 143 MMHG | DIASTOLIC BLOOD PRESSURE: 78 MMHG | TEMPERATURE: 97 F | HEIGHT: 71 IN | BODY MASS INDEX: 23.1 KG/M2 | WEIGHT: 165 LBS

## 2025-07-09 PROCEDURE — 99213 OFFICE O/P EST LOW 20 MIN: CPT

## 2025-07-10 ENCOUNTER — APPOINTMENT (OUTPATIENT)
Dept: INTERNAL MEDICINE | Facility: CLINIC | Age: 70
End: 2025-07-10

## 2025-07-11 ENCOUNTER — RX RENEWAL (OUTPATIENT)
Age: 70
End: 2025-07-11

## 2025-07-21 ENCOUNTER — APPOINTMENT (OUTPATIENT)
Dept: HEMATOLOGY ONCOLOGY | Facility: CLINIC | Age: 70
End: 2025-07-21
Payer: MEDICARE

## 2025-07-21 VITALS
RESPIRATION RATE: 18 BRPM | HEART RATE: 87 BPM | SYSTOLIC BLOOD PRESSURE: 125 MMHG | HEIGHT: 71 IN | TEMPERATURE: 97.5 F | OXYGEN SATURATION: 97 % | DIASTOLIC BLOOD PRESSURE: 87 MMHG | WEIGHT: 160.38 LBS | BODY MASS INDEX: 22.45 KG/M2

## 2025-07-21 DIAGNOSIS — Z12.11 ENCOUNTER FOR SCREENING FOR MALIGNANT NEOPLASM OF COLON: ICD-10-CM

## 2025-07-21 PROCEDURE — 99214 OFFICE O/P EST MOD 30 MIN: CPT

## 2025-07-23 ENCOUNTER — APPOINTMENT (OUTPATIENT)
Dept: GASTROENTEROLOGY | Facility: CLINIC | Age: 70
End: 2025-07-23

## 2025-08-04 ENCOUNTER — OUTPATIENT (OUTPATIENT)
Dept: OUTPATIENT SERVICES | Facility: HOSPITAL | Age: 70
LOS: 1 days | End: 2025-08-04
Payer: MEDICARE

## 2025-08-04 ENCOUNTER — APPOINTMENT (OUTPATIENT)
Dept: CT IMAGING | Facility: HOSPITAL | Age: 70
End: 2025-08-04

## 2025-08-04 DIAGNOSIS — Z90.89 ACQUIRED ABSENCE OF OTHER ORGANS: Chronic | ICD-10-CM

## 2025-08-04 DIAGNOSIS — S82.891A OTHER FRACTURE OF RIGHT LOWER LEG, INITIAL ENCOUNTER FOR CLOSED FRACTURE: Chronic | ICD-10-CM

## 2025-08-04 DIAGNOSIS — Z98.890 OTHER SPECIFIED POSTPROCEDURAL STATES: Chronic | ICD-10-CM

## 2025-08-04 PROCEDURE — 74177 CT ABD & PELVIS W/CONTRAST: CPT

## 2025-08-04 PROCEDURE — 82565 ASSAY OF CREATININE: CPT

## 2025-08-04 PROCEDURE — 71260 CT THORAX DX C+: CPT

## 2025-08-04 PROCEDURE — 74177 CT ABD & PELVIS W/CONTRAST: CPT | Mod: 26

## 2025-08-04 PROCEDURE — 71260 CT THORAX DX C+: CPT | Mod: 26

## 2025-08-07 ENCOUNTER — RX RENEWAL (OUTPATIENT)
Age: 70
End: 2025-08-07

## 2025-08-11 ENCOUNTER — APPOINTMENT (OUTPATIENT)
Dept: HEMATOLOGY ONCOLOGY | Facility: CLINIC | Age: 70
End: 2025-08-11
Payer: MEDICARE

## 2025-08-11 VITALS
OXYGEN SATURATION: 98 % | BODY MASS INDEX: 22.68 KG/M2 | HEART RATE: 91 BPM | WEIGHT: 162 LBS | DIASTOLIC BLOOD PRESSURE: 89 MMHG | RESPIRATION RATE: 18 BRPM | TEMPERATURE: 97.9 F | HEIGHT: 71 IN | SYSTOLIC BLOOD PRESSURE: 130 MMHG

## 2025-08-11 DIAGNOSIS — D3A.8 OTHER BENIGN NEUROENDOCRINE TUMORS: ICD-10-CM

## 2025-08-11 PROCEDURE — 99214 OFFICE O/P EST MOD 30 MIN: CPT

## (undated) DEVICE — SUT VICRYL 1 18" CT-1 UNDYED (POP-OFF)

## (undated) DEVICE — VAGINAL PACKING 2"

## (undated) DEVICE — INSUFFLATION NDL COVIDIEN SURGINEEDLE VERESS 120MM

## (undated) DEVICE — SUT MONOCRYL 4-0 18" PS-2

## (undated) DEVICE — XI VESSEL SEALER

## (undated) DEVICE — DRAPE TOP SHEET 53" X 101"

## (undated) DEVICE — TROCAR COVIDIEN VERSAPORT BLADELESS OPTICAL 5MM STANDARD

## (undated) DEVICE — SUT VICRYL PLUS 0 36" CT-1

## (undated) DEVICE — POSITIONER FOAM EGG CRATE ULNAR 2PCS (PINK)

## (undated) DEVICE — DRSG DERMABOND 0.7ML

## (undated) DEVICE — Device

## (undated) DEVICE — POSITIONER STRAP KNEE & BODY 3X60" DISP

## (undated) DEVICE — POSITIONER PINK PAD PIGAZZI SYSTEM XL W ARM PROTECTOR

## (undated) DEVICE — BLADE SURGICAL #15 CARBON

## (undated) DEVICE — D HELP - CLEARVIEW CLEARIFY SYSTEM

## (undated) DEVICE — SPECIMEN CONTAINER 4OZ

## (undated) DEVICE — MARKING PEN W RULER

## (undated) DEVICE — CLIPPER BLADE GENERAL USE

## (undated) DEVICE — STOPCOCK 4-WAY

## (undated) DEVICE — XI SEAL UNIVERSIAL 5-12MM

## (undated) DEVICE — VENODYNE/SCD SLEEVE CALF MEDIUM

## (undated) DEVICE — SUT VICRYL PLUS 2-0 18" CT-2 (POP-OFF)

## (undated) DEVICE — DRSG DERMABOND PRINEO 22CM

## (undated) DEVICE — WARMING BLANKET UPPER ADULT

## (undated) DEVICE — DRSG STERISTRIPS 0.5 X 4"

## (undated) DEVICE — XI OBTURATOR OPTICAL BLADELESS 8MM

## (undated) DEVICE — SUT PDO 2-0 1/2 CIRCLE 26MM NDL 20CM

## (undated) DEVICE — NDL SPINAL 22G X 3.5" (BLACK)

## (undated) DEVICE — SUT SILK 2-0 30" SH

## (undated) DEVICE — DRAPE TOWEL BLUE 17" X 24"

## (undated) DEVICE — PACK PERI GYN

## (undated) DEVICE — XI DRAPE ARM

## (undated) DEVICE — PACK SPINE

## (undated) DEVICE — FOLEY TRAY 16FR 5CC LF UMETER CLOSED

## (undated) DEVICE — XI ARM NEEDLE DRIVER LARGE

## (undated) DEVICE — GOWN XL

## (undated) DEVICE — DRAPE SURGICAL #1010

## (undated) DEVICE — LIGASURE BLUNT TIP 5MM X 37CM

## (undated) DEVICE — PACK GENERAL LAPAROSCOPY

## (undated) DEVICE — XI DRAPE COLUMN

## (undated) DEVICE — DRAIN JACKSON PRATT 3 SPRING RESERVOIR W 10FR PVC DRAIN

## (undated) DEVICE — XI SCISSOR TIP COVER

## (undated) DEVICE — SYR LUER LOK 30CC

## (undated) DEVICE — XI ARM FORCEP FENESTRATED BIPOLAR 8MM

## (undated) DEVICE — FORCEP RADIAL JAW 4 W NDL 2.2MM 2.8MM 240CM ORANGE DISP

## (undated) DEVICE — MIDAS REX MR8 MATCH HEAD FLUTED LG BORE 3MM X 14CM

## (undated) DEVICE — SUT ETHILON 2-0 18" PS-2

## (undated) DEVICE — TUBING STRYKER PNEUMOSURE HI FLOW INSUFFLATOR

## (undated) DEVICE — SUT SILK 2-0 30" PSL

## (undated) DEVICE — SUT VICRYL PLUS 0 54" TIES

## (undated) DEVICE — SUT VICRYL 3-0 27" SH

## (undated) DEVICE — SUT PDS II 1 27" CT-1

## (undated) DEVICE — XI ARM SCISSOR MONO CURVED

## (undated) DEVICE — TIP METZENBAUM SCISSOR MONOPOLAR ENDOCUT (ORANGE)

## (undated) DEVICE — DRAPE LIGHT HANDLE COVER (BLUE)

## (undated) DEVICE — XI ARM GRASPER TIP UP FENESTRATED

## (undated) DEVICE — PACK ABDOMINAL GENERAL CLOSING

## (undated) DEVICE — XI STAPLER SUREFORM 60

## (undated) DEVICE — GLV 7.5 PROTEXIS (WHITE)

## (undated) DEVICE — GLV 8 PROTEXIS (WHITE)

## (undated) DEVICE — NDL HYPO SAFE 22G X 1.5" (BLACK)